# Patient Record
Sex: FEMALE | Race: WHITE | Employment: UNEMPLOYED | URBAN - METROPOLITAN AREA
[De-identification: names, ages, dates, MRNs, and addresses within clinical notes are randomized per-mention and may not be internally consistent; named-entity substitution may affect disease eponyms.]

---

## 2020-05-22 ENCOUNTER — RESEARCH ENCOUNTER (OUTPATIENT)
Dept: ONCOLOGY | Age: 40
End: 2020-05-22

## 2020-05-22 ENCOUNTER — TELEPHONE (OUTPATIENT)
Dept: ONCOLOGY | Age: 40
End: 2020-05-22

## 2020-05-22 ENCOUNTER — DOCUMENTATION ONLY (OUTPATIENT)
Dept: ONCOLOGY | Age: 40
End: 2020-05-22

## 2020-05-22 ENCOUNTER — OFFICE VISIT (OUTPATIENT)
Dept: ONCOLOGY | Age: 40
End: 2020-05-22

## 2020-05-22 VITALS
OXYGEN SATURATION: 97 % | SYSTOLIC BLOOD PRESSURE: 125 MMHG | TEMPERATURE: 98.1 F | HEIGHT: 64 IN | BODY MASS INDEX: 21.58 KG/M2 | HEART RATE: 49 BPM | RESPIRATION RATE: 17 BRPM | DIASTOLIC BLOOD PRESSURE: 85 MMHG | WEIGHT: 126.4 LBS

## 2020-05-22 DIAGNOSIS — C50.112 MALIGNANT NEOPLASM OF CENTRAL PORTION OF LEFT BREAST IN FEMALE, ESTROGEN RECEPTOR POSITIVE (HCC): Primary | ICD-10-CM

## 2020-05-22 DIAGNOSIS — Z17.0 MALIGNANT NEOPLASM OF CENTRAL PORTION OF LEFT BREAST IN FEMALE, ESTROGEN RECEPTOR POSITIVE (HCC): Primary | ICD-10-CM

## 2020-05-22 PROBLEM — C50.912: Status: ACTIVE | Noted: 2020-05-22

## 2020-05-22 RX ORDER — DOXYCYCLINE 100 MG/1
100 CAPSULE ORAL 2 TIMES DAILY
Qty: 84 CAP | Refills: 0 | Status: SHIPPED | OUTPATIENT
Start: 2020-05-22 | End: 2020-06-18

## 2020-05-22 RX ORDER — ONDANSETRON 8 MG/1
8 TABLET, ORALLY DISINTEGRATING ORAL
Qty: 24 TAB | Refills: 3 | Status: SHIPPED | OUTPATIENT
Start: 2020-05-22

## 2020-05-22 RX ORDER — LIDOCAINE AND PRILOCAINE 25; 25 MG/G; MG/G
CREAM TOPICAL AS NEEDED
Qty: 30 G | Refills: 0 | Status: SHIPPED | OUTPATIENT
Start: 2020-05-22 | End: 2022-02-17 | Stop reason: ALTCHOICE

## 2020-05-22 RX ORDER — METOPROLOL TARTRATE 50 MG/1
TABLET ORAL 2 TIMES DAILY
COMMUNITY
End: 2020-08-13

## 2020-05-22 RX ORDER — PROCHLORPERAZINE MALEATE 10 MG
10 TABLET ORAL
Qty: 50 TAB | Refills: 5 | Status: SHIPPED | OUTPATIENT
Start: 2020-05-22 | End: 2021-03-02

## 2020-05-22 RX ORDER — ONDANSETRON HYDROCHLORIDE 8 MG/1
8 TABLET, FILM COATED ORAL
Qty: 24 TAB | Refills: 3 | Status: CANCELLED | OUTPATIENT
Start: 2020-05-22

## 2020-05-22 RX ORDER — DEXAMETHASONE 4 MG/1
TABLET ORAL
Qty: 32 TAB | Refills: 3 | Status: SHIPPED | OUTPATIENT
Start: 2020-05-22 | End: 2021-03-02

## 2020-05-22 NOTE — PROGRESS NOTES
Ptaricia Finch is a 38 y.o. female here as a new patient for the evaluation of treatment for breast cancer.

## 2020-05-22 NOTE — PROGRESS NOTES
[facility] [place] at [place] [address] [address] [place] [phone]  F: [phone]      Reason for Visit:   Peter Rankin is a 38 y.o. female who is seen in consultation at the request of Dr. Tico Christiansen for evaluation of therapy for breast cancer    Treatment History:   · 20 left breast ax core bx: Adenocarcinoma, ER + at 86% ; MT + at 69%; HER 2 POSITIVE (IHC 2+, FISH ratio 3.2; sig/cell 6.08), ki67 29%  · 20 L breast core bx:  pending    History of Present Illness:   She noticed a L axilla mass in 2020, leading to the pathology above. FH:  No breast, ovarian, prostate, or pancreas cancer    Past Medical History:   Diagnosis Date    Essential hypertension     HX OTHER MEDICAL ,         Infertility     IVF with first pregnancy    Infertility, female     Psychiatric problem       Past Surgical History:   Procedure Laterality Date    HX OTHER SURGICAL      [place] Teeth Removal      Social History     Tobacco Use    Smoking status: Never Smoker   Substance Use Topics    Alcohol use: No      Family History   Problem Relation Age of Onset    Diabetes Mother     Heart Disease Mother      Current Outpatient Medications   Medication Sig    ibuprofen (MOTRIN) 600 mg tablet Take 1 Tab by mouth every six (6) hours as needed for Pain.  sertraline (ZOLOFT) 25 mg tablet Take 25 mg by mouth daily.  PNV No.22-Iron Cbn&Gluc-FA-DOS 27-1-50 mg Tab Take  by mouth. Indications: PREGNANCY     No current facility-administered medications for this visit. No Known Allergies     Review of Systems: A complete review of systems was obtained, negative except as described above. Physical Exam:   There were no vitals taken for this visit.   ECOG PS: 0    Constitutional: Appears well-developed and well-nourished in no apparent distress      Mental status: Alert and awake, Oriented to person/place/time, Able to follow commands    Eyes: EOM normal, Sclera normal, No visible ocular discharge    HENT: Normocephalic, atraumatic    Mouth/Throat: Moist mucous membranes    External Ears normal    Neck: No visualized mass    Pulmonary/Chest: Respiratory effort normal, No visualized signs of difficulty breathing or respiratory distress    Musculoskeletal: Normal gait with no signs of ataxia, Normal range of motion of neck    Neurological: No facial asymmetry (Cranial nerve 7 motor function), No gaze palsy    Skin: No significant exanthematous lesions or discoloration noted on facial skin    Psychiatric: Normal affect, No hallucinations     Breasts: L breast  bandaged; 3 cm L ax LN        Results:     Lab Results   Component Value Date/Time    WBC 7.1 05/18/2016 05:42 PM    HGB 10.7 (L) 05/18/2016 05:42 PM    HCT 32.2 (L) 05/18/2016 05:42 PM    PLATELET 351 (L) 19/03/4224 05:54 AM    MCV 81.7 05/18/2016 05:42 PM    ABS. NEUTROPHILS 8.2 (H) 04/09/2009 04:35 PM     Lab Results   Component Value Date/Time    Sodium 137 05/18/2016 05:42 PM    Potassium 3.6 05/18/2016 05:42 PM    Chloride 103 05/18/2016 05:42 PM    CO2 23 05/18/2016 05:42 PM    Glucose 76 05/18/2016 05:42 PM    BUN 8 05/18/2016 05:42 PM    Creatinine 0.44 (L) 05/18/2016 05:42 PM    GFR est AA >60 05/18/2016 05:42 PM    GFR est non-AA >60 05/18/2016 05:42 PM    Calcium 7.9 (L) 05/18/2016 05:42 PM     Lab Results   Component Value Date/Time    Bilirubin, total 0.5 05/18/2016 05:42 PM    ALT (SGPT) 15 05/18/2016 05:42 PM    AST (SGOT) 16 05/18/2016 05:42 PM    Alk. phosphatase 172 (H) 05/18/2016 05:42 PM    Protein, total 6.1 (L) 05/18/2016 05:42 PM    Albumin 2.8 (L) 05/18/2016 05:42 PM    Globulin 3.3 05/18/2016 05:42 PM     5/20/20 breast  MRI  Subareolar region of L breast 1.2 cm, at least 2 LN on left, 3 cm largest  Right breast negative    5/20/20 bone scan  Heterogeneous activity in the ribs of uncertain significance.   This would be an unusual presentation of bony metastatic disease    5/20/20 CT c/a/p  negative    Records reviewed and summarized above. Pathology report(s) reviewed above. Radiology report(s) reviewed above. Assessment/plan:   1.  L breast cancer, LN + by core, ER +, NV +, HER 2 POSITIVE, cT1c cN1a cM0:  Stage IIA, prognostic stage IB    We explained to the patient that the goal of systemic adjuvant therapy is to improve the chances for cure and decrease the risk of relapse. We explained why a patient can have microscopic cancer spread now even though physical examination, laboratory studies and imaging studies are negative for cancer. We explained that the same treatments used now as adjuvant or preventive treatments rarely if ever are curative in women who develop metastases. We discussed that there is no difference in overall survival between neoadjuvant and adjuvant chemotherapy. We discussed the rationale for neoadjuvant chemotherapy, if chemotherapy is warranted, as it is in this case: to avoid any potential delays in giving chemotherapy following surgery, to be able to see the response of the tumor to chemotherapy, and to potentially downstage the tumor prior to surgery. Salvatore Albino suggests equivalency between q.3 week Adriamycin, Cytoxan followed by weekly paclitaxel and trastuzumab compared with the [facility] regimen. However, this study was not powered to show a difference between these two regimens, and in the [facility] publication, the AC-TH arm showed a numerically advantange (though not statistically significant) to the [facility] arm. In this patient, it is completely reasonable to use [facility] approach and avoid the potential cardiotoxicity of the anthracyclines as well as the potential for leukemia. We discussed the toxicities of docetaxel and carboplatin chemotherapy in detail. This chemotherapy frequently causes a low white blood cell count and hospital admissions for treatment of neutropenic fever.   We explained that we consider the use of growth factors to minimize this risk.  We explained to the patient that some side effects if they occur only last a few days including nausea, vomiting, stomatitis, arthralgia, myalgia,and allergic reactions to Taxotere. We told the patient that severe nausea and vomiting were uncommon and that some side effects,if they occur, will last longer; this includes hair loss, which will be seen in all patients treated with these agents and fatigue,which will be seen in most.  We also informed that for the patient that heart damage is rare with these agents. We explained that carboplatin can rarely cause kidney damage and high frequency hearing loss. We provided the patient in detail her information concerning the toxicities of this regimen in addition to her overall discussion. Rationale for therapy with trastuzumab was also discussed with the patient including a 50% proportional improvement in disease free survival and also an improvement in overall survival in patients receiving trastuzumab and chemotherapy for HER-2 positive breast cancer. The side effects of trastuzumab were discussed including a 4%-5% risk of dropping her ejection fraction while on treatment and about a 1% risk of CHF. We discussed that this drug will be used every 3 weeks for remainder of a year following the chemotherapy cycles. We will check her EF before chemotherapy and every 3 months while she is receiving trastuzumab. Rational for therapy with pertuzumab was also discussed with the patient including a nearly 20% improvement seen in pCR in the neoadjuvant setting with the addition of this medication. Additional AE discussed including an acne rash (and the use of doxycyline 100 mg bid to help prevent) and diarrhea and consideration of additional cardiomyopathy.     · TCH-P (Trastuzumab 8mg/kg load with cycle 1 then 6mg/kg, Docetaxel 75mg/m2, Carboplatin AUC 6, Pertuzumab 840mg load with cycle 1 then 420mg) given every 3 weeks x 6 cycles  · Labs: CBC, CMP prior to each treatment. · Antiemetic Prophylaxis: Palonosetron and dexamethasone prior to chemo  · PRN Antiemetics: Ondansetron, Compazine  · Swelling prophylaxis: Dexmethasone 8mg bid the day before and day after chemotherapy  · TTE prior to chemotherapy and every 12 weeks while on Trastuzumab  · Neulasta 24-72 hours after each treatment    Cold caps discussed. She is interested and got fitted    Oral and peripheral cryotherapy discussed. Peripheral neuropathy trial discussed and research met with her (essential trial for her). Screened by me for compass HER 2 de-escalation study, but her primary tumor is not > 1.5 cm    Discussed outback HP if pCR, if no pCR, discussed T-DM1     After this discussion, she is agreeable to [facility] as above, she has signed informed consent. TTE ordered. Dr. Roldan Wright to place port on 5/27/20     Breast mass biopsied and clipped on 5/21/20    The patient was given presciptions for a wig, emla cream, decadron to take 8 mg bid the day before and day after chemo, zofran and compazine. Neulasta the following day. Plan to start 6/1/20    IUD will be removed by gyn    2. Emotional well being:  She has excellent support and is coping well with her disease    3. Genetic testing:  discussed potential ramifications of a positive test including the potential need for bilateral mastectomies and bilateral oophorectomies and the risk then for her family members to also have a mutation. VUS also discussed. Tested in Dr. Samia Gregory office on 5/21/20    4. Loss of fertility:  Discussed potential loss of menses and fertility. She is not interested in having further children. Declines oncofertility consult    > 80 min were spent with this patient with > 50% of that time spent in face to face counseling. I appreciate the opportunity to participate in Ms. Nora Hayward care. Signed By: Aide Charles MD      No orders of the defined types were placed in this encounter.

## 2020-05-22 NOTE — TELEPHONE ENCOUNTER
5/22/2020 8:32 AM: [facility] radiology and left voicemail requesting patient's scan results from yesterday.  RN will also fax a stat request.

## 2020-05-22 NOTE — PROGRESS NOTES
5/22/2020 11:28 AM: Provided patient with a chemotherapy education packet including a handout on breast cancer diagnosis, a handout on TCH-P chemotherapy regimen, a handout on common side effects of chemotherapy, and a handout on how to safely handle body secretions and waste after chemotherapy.  RN reviewed packet with the patient and opportunity was provided for questions and concerns.

## 2020-05-22 NOTE — PROGRESS NOTES
Met with patient regarding interest in clinical trial . Provided patient a copy of the consent for her review. Answered all immediate questions. No further questions at this time. Will follow up on 5/29 for decision.

## 2020-05-22 NOTE — TELEPHONE ENCOUNTER
Spoke with patient in regards to follow up appointment and treatment schedule. We also talk about signing up for MY Chart. I e-mail her the AVS from her visit today with the schedule of treatment. Did instruct patient to call our office before her treatment day if policy had change in regard to having visitor with patient for treatment at this moment- no visitor are allow.

## 2020-05-22 NOTE — PATIENT INSTRUCTIONS
Common Side Effects of Chemotherapy  Decreased Blood Counts Your blood counts can decrease temporarily due to chemotherapy, they will recover over time. This is an expected side effect that your Doctor will be monitoring.  - If you experience fevers (temperature >100.4°F), bleeding or unexplained bruising, please call the office right away   Risk of Infection Your white blood cells can decrease temporarily due to chemotherapy and can put you at higher risk of infection. Washing hands frequently with soap and avoiding sick contacts can reduce your risk of infection.  - If you experience fevers (temperature >100.4°F), shaking chills, or any signs of infection, please call the office immediately   Anemia Chemotherapy can cause your red blood cells to temporarily decrease; this is an expected side effect that your Doctor will be monitoring.  - You may experience fatigue if this occurs, please notify the office if you experience bleeding, shortness of breath with minimal exertion or at rest, rapid heartbeat, or feeling as though you may lose consciousness. Hair Loss Chemotherapy can affect your hair follicles and cause you to lose hair. This can occur on your scalp hair but also all over your body including eyebrows and eye lashes   Nausea  You have been prescribed nausea medication to take if needed. Please follow the directions given to you by your Doctor. - Please call the office if the medications you have been given are not relieving nausea. Vomiting Make sure you are taking anti-nausea medication as prescribed. Eating small amounts of bland foods frequently can help. - Please call the office right away if you are vomiting more than 4 times per day or are unable to keep down food or fluids   Diarrhea Eating small amounts of bland foods frequently can help, increase your fluid intake. It is usually ok to take Imodium for diarrhea.   - Please call the office right away if you experience more than 4 episodes of watery diarrhea or if you are feeling dehydrated. Female patients of childbearing age need to avoid pregnancy during chemotherapy. You can reach Medical Oncology at [address] [facility] with further questions or concerns at: [phone].  - Calls during normal business hours will reach our office.  - Calls after hours or on the weekend will reach an answering service and the on-call Oncologist will return your call. Prognosis: Good     This is our best current assessment. Cancers respond differently to treatment. Overall prognosis depends on many factors including other conditions, cancer stage, side effects, and other unforeseen events. Goal of therapy: Curative     Expected response to treatment:  Very good: Anticipate remission (no sign of cancer) and possible cancer cure    Treatment benefits and harms:  We discussed potential short term side effects to include:see handouts    Long term side effects of treatment:  see handouts    Quality of life: Quality of life concerns have been addressed. Treatment as outlined is expected to have minimal impact on patients quality of life.      presciptions for a wig, emla cream, decadron to take 8 mg twice a day, the day before and day after chemo, zofran and compazine

## 2020-05-22 NOTE — ONCOLOGY PATHWAY NOTE
START ON PATHWAY REGIMEN - Breast    [id number]        Pertuzumab (Perjeta)       Pertuzumab (Perjeta)       Trastuzumab-xxxx       Trastuzumab-xxxx       Carboplatin (Paraplatin)       Docetaxel (Taxotere)     **Always confirm dose/schedule in your pharmacy ordering system**    Patient Characteristics:  Preoperative or Nonsurgical Candidate (Clinical Staging), Neoadjuvant Therapy followed by Surgery, Invasive Disease, Chemotherapy, HER2 Positive, ER Positive  Therapeutic Status: Preoperative or Nonsurgical Candidate (Clinical Staging)  AJCC M Category: cM0  AJCC Grade: GX  Breast Surgical Plan: Neoadjuvant Therapy followed by Surgery  ER Status: Positive (+)  AJCC 8 Stage Grouping: IB  HER2 Status: Positive (+)  AJCC T Category: cTX  AJCC N Category: cNX  RI Status: Positive (+)  Intent of Therapy:  Curative Intent, Discussed with Patient

## 2020-05-29 RX ORDER — DIPHENHYDRAMINE HYDROCHLORIDE 50 MG/ML
50 INJECTION, SOLUTION INTRAMUSCULAR; INTRAVENOUS AS NEEDED
Status: CANCELLED
Start: 2020-06-01

## 2020-05-29 RX ORDER — SODIUM CHLORIDE 0.9 % (FLUSH) 0.9 %
10 SYRINGE (ML) INJECTION AS NEEDED
Status: CANCELLED
Start: 2020-06-01

## 2020-05-29 RX ORDER — SODIUM CHLORIDE 9 MG/ML
25 INJECTION, SOLUTION INTRAVENOUS CONTINUOUS
Status: CANCELLED | OUTPATIENT
Start: 2020-06-01

## 2020-05-29 RX ORDER — HEPARIN 100 UNIT/ML
300-500 SYRINGE INTRAVENOUS AS NEEDED
Status: CANCELLED
Start: 2020-06-01

## 2020-05-29 RX ORDER — HYDROCORTISONE SODIUM SUCCINATE 100 MG/2ML
100 INJECTION, POWDER, FOR SOLUTION INTRAMUSCULAR; INTRAVENOUS AS NEEDED
Status: CANCELLED | OUTPATIENT
Start: 2020-06-01

## 2020-05-29 RX ORDER — ACETAMINOPHEN 325 MG/1
650 TABLET ORAL AS NEEDED
Status: CANCELLED
Start: 2020-06-01

## 2020-05-29 RX ORDER — EPINEPHRINE 1 MG/ML
0.3 INJECTION, SOLUTION, CONCENTRATE INTRAVENOUS AS NEEDED
Status: CANCELLED | OUTPATIENT
Start: 2020-06-01

## 2020-05-29 RX ORDER — SODIUM CHLORIDE 9 MG/ML
10 INJECTION INTRAMUSCULAR; INTRAVENOUS; SUBCUTANEOUS AS NEEDED
Status: CANCELLED | OUTPATIENT
Start: 2020-06-01

## 2020-05-29 RX ORDER — ALBUTEROL SULFATE 0.83 MG/ML
2.5 SOLUTION RESPIRATORY (INHALATION) AS NEEDED
Status: CANCELLED
Start: 2020-06-01

## 2020-05-29 RX ORDER — DEXAMETHASONE SODIUM PHOSPHATE 100 MG/10ML
10 INJECTION INTRAMUSCULAR; INTRAVENOUS ONCE
Status: CANCELLED | OUTPATIENT
Start: 2020-06-01

## 2020-05-29 RX ORDER — PALONOSETRON 0.05 MG/ML
0.25 INJECTION, SOLUTION INTRAVENOUS ONCE
Status: CANCELLED
Start: 2020-06-01

## 2020-05-29 RX ORDER — ONDANSETRON 2 MG/ML
8 INJECTION INTRAMUSCULAR; INTRAVENOUS AS NEEDED
Status: CANCELLED | OUTPATIENT
Start: 2020-06-01

## 2020-05-29 RX ORDER — DIPHENHYDRAMINE HYDROCHLORIDE 50 MG/ML
25 INJECTION, SOLUTION INTRAMUSCULAR; INTRAVENOUS AS NEEDED
Status: CANCELLED
Start: 2020-06-01

## 2020-06-01 ENCOUNTER — HOSPITAL ENCOUNTER (OUTPATIENT)
Dept: INFUSION THERAPY | Age: 40
Discharge: HOME | End: 2020-06-01
Payer: COMMERCIAL

## 2020-06-01 ENCOUNTER — RESEARCH ENCOUNTER (OUTPATIENT)
Dept: ONCOLOGY | Age: 40
End: 2020-06-01

## 2020-06-01 ENCOUNTER — OFFICE VISIT (OUTPATIENT)
Dept: ONCOLOGY | Age: 40
End: 2020-06-01

## 2020-06-01 VITALS
SYSTOLIC BLOOD PRESSURE: 128 MMHG | TEMPERATURE: 98.3 F | OXYGEN SATURATION: 98 % | DIASTOLIC BLOOD PRESSURE: 76 MMHG | HEIGHT: 64 IN | BODY MASS INDEX: 21.34 KG/M2 | WEIGHT: 125 LBS | RESPIRATION RATE: 16 BRPM | HEART RATE: 52 BPM

## 2020-06-01 VITALS
DIASTOLIC BLOOD PRESSURE: 77 MMHG | HEART RATE: 67 BPM | TEMPERATURE: 98.3 F | BODY MASS INDEX: 21.44 KG/M2 | SYSTOLIC BLOOD PRESSURE: 126 MMHG | WEIGHT: 125.6 LBS | OXYGEN SATURATION: 98 % | HEIGHT: 64 IN | RESPIRATION RATE: 16 BRPM

## 2020-06-01 DIAGNOSIS — C50.112 MALIGNANT NEOPLASM OF CENTRAL PORTION OF LEFT BREAST IN FEMALE, ESTROGEN RECEPTOR POSITIVE (HCC): Primary | ICD-10-CM

## 2020-06-01 DIAGNOSIS — Z17.0 MALIGNANT NEOPLASM OF CENTRAL PORTION OF LEFT BREAST IN FEMALE, ESTROGEN RECEPTOR POSITIVE (HCC): Primary | ICD-10-CM

## 2020-06-01 DIAGNOSIS — Z51.11 CHEMOTHERAPY MANAGEMENT, ENCOUNTER FOR: ICD-10-CM

## 2020-06-01 DIAGNOSIS — C50.912 STAGE II BREAST CANCER, LEFT (HCC): Primary | ICD-10-CM

## 2020-06-01 LAB
ALBUMIN SERPL-MCNC: 4.3 G/DL (ref 3.5–5)
ALBUMIN/GLOB SERPL: 1.3 {RATIO} (ref 1.1–2.2)
ALP SERPL-CCNC: 51 U/L (ref 45–117)
ALT SERPL-CCNC: 23 U/L (ref 12–78)
ANION GAP SERPL CALC-SCNC: 9 MMOL/L (ref 5–15)
AST SERPL-CCNC: 14 U/L (ref 15–37)
BASO+EOS+MONOS # BLD AUTO: 0.3 K/UL (ref 0.2–1.2)
BASO+EOS+MONOS NFR BLD AUTO: 2 % (ref 3.2–16.9)
BILIRUB SERPL-MCNC: 0.6 MG/DL (ref 0.2–1)
BUN SERPL-MCNC: 11 MG/DL (ref 6–20)
BUN/CREAT SERPL: 14 (ref 12–20)
CALCIUM SERPL-MCNC: 8.9 MG/DL (ref 8.5–10.1)
CHLORIDE SERPL-SCNC: 103 MMOL/L (ref 97–108)
CO2 SERPL-SCNC: 25 MMOL/L (ref 21–32)
CREAT SERPL-MCNC: 0.8 MG/DL (ref 0.55–1.02)
DIFFERENTIAL METHOD BLD: ABNORMAL
ERYTHROCYTE [DISTWIDTH] IN BLOOD BY AUTOMATED COUNT: 13 % (ref 11.8–15.8)
GLOBULIN SER CALC-MCNC: 3.3 G/DL (ref 2–4)
GLUCOSE SERPL-MCNC: 147 MG/DL (ref 65–100)
HCT VFR BLD AUTO: 39 % (ref 35–47)
HGB BLD-MCNC: 14.2 G/DL (ref 11.5–16)
LYMPHOCYTES # BLD: 0.8 K/UL (ref 0.8–3.5)
LYMPHOCYTES NFR BLD: 7 % (ref 12–49)
MCH RBC QN AUTO: 32.1 PG (ref 26–34)
MCHC RBC AUTO-ENTMCNC: 36.4 G/DL (ref 30–36.5)
MCV RBC AUTO: 88.2 FL (ref 80–99)
NEUTS SEG # BLD: 10.2 K/UL (ref 1.8–8)
NEUTS SEG NFR BLD: 91 % (ref 32–75)
PLATELET # BLD AUTO: 244 K/UL (ref 150–400)
POTASSIUM SERPL-SCNC: 3.8 MMOL/L (ref 3.5–5.1)
PROT SERPL-MCNC: 7.6 G/DL (ref 6.4–8.2)
RBC # BLD AUTO: 4.42 M/UL (ref 3.8–5.2)
SODIUM SERPL-SCNC: 137 MMOL/L (ref 136–145)
WBC # BLD AUTO: 11.3 K/UL (ref 3.6–11)

## 2020-06-01 PROCEDURE — 96417 CHEMO IV INFUS EACH ADDL SEQ: CPT

## 2020-06-01 PROCEDURE — 36415 COLL VENOUS BLD VENIPUNCTURE: CPT

## 2020-06-01 PROCEDURE — 74011000258 HC RX REV CODE- 258: Performed by: INTERNAL MEDICINE

## 2020-06-01 PROCEDURE — 74011250636 HC RX REV CODE- 250/636: Performed by: INTERNAL MEDICINE

## 2020-06-01 PROCEDURE — 96413 CHEMO IV INFUSION 1 HR: CPT

## 2020-06-01 PROCEDURE — 96415 CHEMO IV INFUSION ADDL HR: CPT

## 2020-06-01 PROCEDURE — 96377 APPLICATON ON-BODY INJECTOR: CPT

## 2020-06-01 PROCEDURE — 77030016057 HC NDL HUBR APOL -B

## 2020-06-01 PROCEDURE — 80053 COMPREHEN METABOLIC PANEL: CPT

## 2020-06-01 PROCEDURE — 96367 TX/PROPH/DG ADDL SEQ IV INF: CPT

## 2020-06-01 PROCEDURE — 96375 TX/PRO/DX INJ NEW DRUG ADDON: CPT

## 2020-06-01 PROCEDURE — 85025 COMPLETE CBC W/AUTO DIFF WBC: CPT

## 2020-06-01 RX ORDER — SODIUM CHLORIDE 9 MG/ML
10 INJECTION INTRAMUSCULAR; INTRAVENOUS; SUBCUTANEOUS AS NEEDED
Status: DISCONTINUED | OUTPATIENT
Start: 2020-06-01 | End: 2020-06-02 | Stop reason: HOSPADM

## 2020-06-01 RX ORDER — ACETAMINOPHEN 325 MG/1
650 TABLET ORAL AS NEEDED
Status: ACTIVE | OUTPATIENT
Start: 2020-06-01 | End: 2020-06-01

## 2020-06-01 RX ORDER — PALONOSETRON 0.05 MG/ML
0.25 INJECTION, SOLUTION INTRAVENOUS ONCE
Status: COMPLETED | OUTPATIENT
Start: 2020-06-01 | End: 2020-06-01

## 2020-06-01 RX ORDER — DEXAMETHASONE SODIUM PHOSPHATE 100 MG/10ML
10 INJECTION INTRAMUSCULAR; INTRAVENOUS ONCE
Status: COMPLETED | OUTPATIENT
Start: 2020-06-01 | End: 2020-06-01

## 2020-06-01 RX ORDER — HEPARIN 100 UNIT/ML
300-500 SYRINGE INTRAVENOUS AS NEEDED
Status: DISCONTINUED | OUTPATIENT
Start: 2020-06-01 | End: 2020-06-02 | Stop reason: HOSPADM

## 2020-06-01 RX ORDER — ONDANSETRON 2 MG/ML
8 INJECTION INTRAMUSCULAR; INTRAVENOUS AS NEEDED
Status: ACTIVE | OUTPATIENT
Start: 2020-06-01 | End: 2020-06-01

## 2020-06-01 RX ORDER — DIPHENHYDRAMINE HYDROCHLORIDE 50 MG/ML
25 INJECTION, SOLUTION INTRAMUSCULAR; INTRAVENOUS AS NEEDED
Status: ACTIVE | OUTPATIENT
Start: 2020-06-01 | End: 2020-06-01

## 2020-06-01 RX ORDER — SODIUM CHLORIDE 0.9 % (FLUSH) 0.9 %
10 SYRINGE (ML) INJECTION AS NEEDED
Status: DISCONTINUED | OUTPATIENT
Start: 2020-06-01 | End: 2020-06-02 | Stop reason: HOSPADM

## 2020-06-01 RX ORDER — SODIUM CHLORIDE 9 MG/ML
25 INJECTION, SOLUTION INTRAVENOUS CONTINUOUS
Status: DISPENSED | OUTPATIENT
Start: 2020-06-01 | End: 2020-06-01

## 2020-06-01 RX ORDER — HYDROCORTISONE SODIUM SUCCINATE 100 MG/2ML
100 INJECTION, POWDER, FOR SOLUTION INTRAMUSCULAR; INTRAVENOUS AS NEEDED
Status: ACTIVE | OUTPATIENT
Start: 2020-06-01 | End: 2020-06-01

## 2020-06-01 RX ADMIN — SODIUM CHLORIDE 25 ML/HR: 900 INJECTION, SOLUTION INTRAVENOUS at 09:42

## 2020-06-01 RX ADMIN — CARBOPLATIN 650 MG: 10 INJECTION, SOLUTION INTRAVENOUS at 12:33

## 2020-06-01 RX ADMIN — PERTUZUMAB 840 MG: 30 INJECTION, SOLUTION, CONCENTRATE INTRAVENOUS at 14:54

## 2020-06-01 RX ADMIN — Medication 10 ML: at 16:23

## 2020-06-01 RX ADMIN — TRASTUZUMAB 458 MG: 150 INJECTION, POWDER, LYOPHILIZED, FOR SOLUTION INTRAVENOUS at 13:11

## 2020-06-01 RX ADMIN — DEXAMETHASONE SODIUM PHOSPHATE 10 MG: 10 INJECTION INTRAMUSCULAR; INTRAVENOUS at 09:45

## 2020-06-01 RX ADMIN — DOCETAXEL ANHYDROUS 121 MG: 10 INJECTION, SOLUTION INTRAVENOUS at 11:23

## 2020-06-01 RX ADMIN — FOSAPREPITANT 150 MG: 150 INJECTION, POWDER, LYOPHILIZED, FOR SOLUTION INTRAVENOUS at 09:52

## 2020-06-01 RX ADMIN — PALONOSETRON HYDROCHLORIDE 0.25 MG: 0.25 INJECTION INTRAVENOUS at 09:43

## 2020-06-01 RX ADMIN — Medication 500 UNITS: at 16:22

## 2020-06-01 RX ADMIN — PEGFILGRASTIM 6 MG: KIT SUBCUTANEOUS at 16:06

## 2020-06-01 NOTE — PROGRESS NOTES
Pt has been screened for all eligibility/ineligibility criteria and has been determined eligible for this protocol. Informed consent was reviewed by RN with patient prior to signing. All questions were answered adequately by RN or Dr. Champion. Patient signed consent today for study [id number]: \"A Prospective Observational Cohort Study to Develop a Predictive Model of Taxane-Induced Peripheral Neuropathy in Cancer Patients\". A copy of ICF given to patient. No additional questions at this time. Week 1 blood work obtained and processed per protocol. Patient also consented to DCP-001. Questionnaire completed per protocol. A copy of consent given to patient. No additional questions at this time.

## 2020-06-01 NOTE — PROGRESS NOTES
Anthony Wilson is a 38 y.o. female Follow up for the Evaluation of Breast Cancer. 1. Have you been to the ER, urgent care clinic since your last visit? Hospitalized since your last visit? No    2. Have you seen or consulted any other health care providers outside of the [address] [place] [address] since your last visit? Include any pap smears or colon screening.  No

## 2020-06-01 NOTE — PROGRESS NOTES
[facility] Progress Note    Date: 2020    Name: Gwen Jha    MRN: [id number]         : 1980    Ms. Bob Colon Arrived ambulatory and in no distress for cycle 1 day 1 of TCHP regimen. Assessment was completed, no acute issues at this time, no new complaints voiced. R chest port accessed without difficulty, labs drawn and in process. Education handout given to patient. Reviewed treatment plan and side effects with patient. Patient used cold cap today. Neuropathy labs were drawn for research study. Chemotherapy Flowsheet 2020   Cycle C1D1   Date 2020   Drug / Regimen TCHP         Proceeded to appt with Dr. Jagruti Haley    Ms. Mulligan's vitals were reviewed. Visit Vitals  BP (P) 128/76   Pulse (!) (P) 52   Temp (P) 98.3 °F (36.8 °C)   Resp (P) 16   SpO2 (P) 98%       Lab results were obtained and reviewed. Recent Results (from the past 12 hour(s))   CBC WITH 3 PART DIFF    Collection Time: 20  8:05 AM   Result Value Ref Range    WBC 11.3 (H) 3.6 - 11.0 K/uL    RBC 4.42 3.80 - 5.20 M/uL    HGB 14.2 11.5 - 16.0 g/dL    HCT 39.0 35.0 - 47.0 %    MCV 88.2 80.0 - 99.0 FL    MCH 32.1 26.0 - 34.0 PG    MCHC 36.4 30.0 - 36.5 g/dL    RDW 13.0 11.8 - 15.8 %    PLATELET 884 483 - 411 K/uL    NEUTROPHILS 91 (H) 32 - 75 %    MIXED CELLS 2 (L) 3.2 - 16.9 %    LYMPHOCYTES 7 (L) 12 - 49 %    ABS. NEUTROPHILS 10.2 (H) 1.8 - 8.0 K/UL    ABS. MIXED CELLS 0.3 0.2 - 1.2 K/uL    ABS.  LYMPHOCYTES 0.8 0.8 - 3.5 K/UL    DF AUTOMATED     METABOLIC PANEL, COMPREHENSIVE    Collection Time: 20  8:05 AM   Result Value Ref Range    Sodium 137 136 - 145 mmol/L    Potassium 3.8 3.5 - 5.1 mmol/L    Chloride 103 97 - 108 mmol/L    CO2 25 21 - 32 mmol/L    Anion gap 9 5 - 15 mmol/L    Glucose 147 (H) 65 - 100 mg/dL    BUN 11 6 - 20 MG/DL    Creatinine 0.80 0.55 - 1.02 MG/DL    BUN/Creatinine ratio 14 12 - 20      GFR est AA >60 >60 ml/min/1.73m2    GFR est non-AA >60 >60 ml/min/1.73m2    Calcium 8.9 8.5 - 10.1 MG/DL Bilirubin, total 0.6 0.2 - 1.0 MG/DL    ALT (SGPT) 23 12 - 78 U/L    AST (SGOT) 14 (L) 15 - 37 U/L    Alk. phosphatase 51 45 - 117 U/L    Protein, total 7.6 6.4 - 8.2 g/dL    Albumin 4.3 3.5 - 5.0 g/dL    Globulin 3.3 2.0 - 4.0 g/dL    A-G Ratio 1.3 1.1 - 2.2       Pre-medications  were administered as ordered and chemotherapy was initiated.      Medications Administered     0.9% sodium chloride infusion     Admin Date  06/01/2020 Action  New Bag Dose  25 mL/hr Rate  25 mL/hr Route  IntraVENous Administered By  Jama Taylor          CARBOplatin (PARAPLATIN) 650 mg in 0.9% sodium chloride 250 mL, overfill volume 25 mL chemo infusion     Admin Date  06/01/2020 Action  New Bag Dose  650 mg Rate  680 mL/hr Route  IntraVENous Administered By  Jama Taylor          dexamethasone (DECADRON) 10 mg/mL injection 10 mg     Admin Date  06/01/2020 Action  Given Dose  10 mg Route  IntraVENous Administered By  Jama Taylor          DOCEtaxeL (TAXOTERE) 121 mg in 0.9% sodium chloride 250 mL, overfill volume 25 mL chemo infusion     Admin Date  06/01/2020 Action  New Bag Dose  121 mg Route  IntraVENous Administered By  Jama Taylor          fosaprepitant (EMEND) 150 mg in 0.9% sodium chloride 150 mL IVPB     Admin Date  06/01/2020 Action  Given Dose  150 mg Rate  450 mL/hr Route  IntraVENous Administered By  Jama Taylor          heparin (porcine) pf 300-500 Units     Admin Date  06/01/2020 Action  Given Dose  500 Units Route  InterCATHeter Administered By  Jama Taylor          palonosetron HCl (ALOXI) injection 0.25 mg     Admin Date  06/01/2020 Action  Given Dose  0.25 mg Route  IntraVENous Administered By  Jama Taylor          pegfilgrastim (NEULASTA) wearable SQ injector 6 mg     Admin Date  06/01/2020 Action  Given Dose  6 mg Route  SubCUTAneous Administered By  Jama Taylor          pertuzumab (PERJETA) 840 mg in 0.9% sodium chloride 250 mL, overfill volume 25 mL IVPB     Admin Date  06/01/2020 Action  New Bag Dose  840 mg Rate  303 mL/hr Route  IntraVENous Administered By  Tivis Odessa          saline peripheral flush soln 10 mL     Admin Date  06/01/2020 Action  Given Dose  10 mL Route  InterCATHeter Administered By  Tivis Odessa          trastuzumab (HERCEPTIN) 458 mg in 0.9% sodium chloride 250 mL, overfill volume 25 mL IVPB     Admin Date  06/01/2020 Action  New Bag Dose  458 mg Rate  197.9 mL/hr Route  IntraVENous Administered By  Tivis Odessa                Education provided to patient about Neulasta On Body Injector including: side effects, how/when to remove the device, as well as what to do in the event of device malfunction. Opportunity for questions was provided and all questions were answered. Patient verbalized understanding. Ms. Shannon Salazar tolerated treatment well and was discharged from [place] [address] in stable condition. She is to return on 6/22 for her next appointment.     Adis Canas  June 1, 2020

## 2020-06-01 NOTE — PROGRESS NOTES
[facility] [facility] [place] [address]  [address] [facility], [address] [facility]  Frankey Reil: [phone]  F: [phone]      Reason for Visit:   Luz Maria Carmichael is a 38 y.o. female who is seen in consultation at the request of Dr. Ganga Azar for evaluation of therapy for breast cancer    Treatment History:   · 20 left breast ax core bx: Adenocarcinoma, ER + at 86% ; CO + at 69%; HER 2 POSITIVE (IHC 2+, FISH ratio 3.2; sig/cell 6.08), ki67 29%  · 20 L breast core bx: Atypical 1 mm focus, highly suspicious for [facility], lobular features, the tumor does not histologically match the patient's prior axillary cancer, but could represent a small sample of the same tumor  · 20 L breast excisional bx: Subareolar lumpectomy, IDC, 1.6 cm, invasive tumor present at anterior and lateral margins, gr 2, + LVI,  LN involved, 1.1 cm  · TCHP 2020-     History of Present Illness:   She noticed a L axilla mass in 2020, leading to the pathology above. Interval history:  In today for follow up and treatment. Complains of gr 1 fatigue.     FH:  No breast, ovarian, prostate, or pancreas cancer    Past Medical History:   Diagnosis Date    Anxiety     Cancer [facility])     Essential hypertension     HX OTHER MEDICAL ,         Infertility     IVF with first pregnancy    Infertility, female     Joint pain     Psychiatric problem       Past Surgical History:   Procedure Laterality Date    HX OTHER SURGICAL      [place] Teeth Removal    HX OTHER SURGICAL      2017 Excision of mole on toe with skin graph      Social History     Tobacco Use    Smoking status: Never Smoker    Smokeless tobacco: Never Used   Substance Use Topics    Alcohol use: Yes     Frequency: 4 or more times a week      Family History   Problem Relation Age of Onset    Diabetes Mother     Heart Disease Mother     Hypertension Mother     Hypertension Father     Cancer Paternal Grandmother         Lung cancer    Stroke Paternal Grandmother     Cancer Paternal Grandfather         Melanoma     Current Outpatient Medications   Medication Sig    prochlorperazine (COMPAZINE) 10 mg tablet Take 1 Tab by mouth every six (6) hours as needed for Nausea.  dexAMETHasone (DECADRON) 4 mg tablet 8 mg bid the day before and day after chemo    lidocaine-prilocaine (EMLA) topical cream Apply  to affected area as needed for Pain.  doxycycline (VIBRAMYCIN) 100 mg capsule Take 1 Cap by mouth two (2) times a day for 42 days.  ondansetron (ZOFRAN ODT) 8 mg disintegrating tablet Take 1 Tab by mouth every eight (8) hours as needed for Nausea or Vomiting. For 2 days following chemo    metoprolol tartrate (LOPRESSOR) 50 mg tablet Take  by mouth two (2) times a day.  ibuprofen (MOTRIN) 600 mg tablet Take 1 Tab by mouth every six (6) hours as needed for Pain.  sertraline (ZOLOFT) 25 mg tablet Take 25 mg by mouth daily.  PNV No.22-Iron Cbn&Gluc-FA-DOS 27-1-50 mg Tab Take  by mouth. Indications: PREGNANCY     No current facility-administered medications for this visit. No Known Allergies     Review of Systems: A complete review of systems was obtained, negative except as described above.     Physical Exam:     Visit Vitals  /76 (BP 1 Location: Left arm, BP Patient Position: Sitting)   Pulse (!) 52   Temp 98.3 °F (36.8 °C) (Temporal)   Resp 16   Ht 5' 4\" (1.626 m)   Wt 125 lb (56.7 kg)   SpO2 98%   BMI 21.46 kg/m²     ECOG PS: 0    Constitutional: Appears well-developed and well-nourished in no apparent distress      Mental status: Alert and awake, Oriented to person/place/time, Able to follow commands    Eyes: EOM normal, Sclera normal, No visible ocular discharge    HENT: Normocephalic, atraumatic    Mouth/Throat: Moist mucous membranes    External Ears normal    Neck: No visualized mass    Pulmonary/Chest: Respiratory effort normal, No visualized signs of difficulty breathing or respiratory distress    Musculoskeletal: Normal gait with no signs of ataxia, Normal range of motion of neck    Neurological: No facial asymmetry (Cranial nerve 7 motor function), No gaze palsy    Skin: No significant exanthematous lesions or discoloration noted on facial skin    Psychiatric: Normal affect, No hallucinations     Breasts: L breast 1 cm subareolar mass;  2.2 cm L ax LN, 1.5 cm L ax LN        Results:     Lab Results   Component Value Date/Time    WBC 11.3 (H) 06/01/2020 08:05 AM    HGB 14.2 06/01/2020 08:05 AM    HCT 39.0 06/01/2020 08:05 AM    PLATELET 661 06/68/1318 08:05 AM    MCV 88.2 06/01/2020 08:05 AM    ABS. NEUTROPHILS 10.2 (H) 06/01/2020 08:05 AM     Lab Results   Component Value Date/Time    Sodium 137 05/18/2016 05:42 PM    Potassium 3.6 05/18/2016 05:42 PM    Chloride 103 05/18/2016 05:42 PM    CO2 23 05/18/2016 05:42 PM    Glucose 76 05/18/2016 05:42 PM    BUN 8 05/18/2016 05:42 PM    Creatinine 0.44 (L) 05/18/2016 05:42 PM    GFR est AA >60 05/18/2016 05:42 PM    GFR est non-AA >60 05/18/2016 05:42 PM    Calcium 7.9 (L) 05/18/2016 05:42 PM     Lab Results   Component Value Date/Time    Bilirubin, total 0.5 05/18/2016 05:42 PM    ALT (SGPT) 15 05/18/2016 05:42 PM    Alk. phosphatase 172 (H) 05/18/2016 05:42 PM    Protein, total 6.1 (L) 05/18/2016 05:42 PM    Albumin 2.8 (L) 05/18/2016 05:42 PM    Globulin 3.3 05/18/2016 05:42 PM     5/20/20 breast  MRI  Subareolar region of L breast 1.2 cm mass, at least 2 LN on left, 3 cm largest  Right breast negative    5/20/20 bone scan  Heterogeneous activity in the ribs of uncertain significance. This would be an unusual presentation of bony metastatic disease    5/20/20 CT c/a/p  negative    Records reviewed and summarized above. Pathology report(s) reviewed above. Radiology report(s) reviewed above.       Assessment/plan:   1.  L breast cancer, LN + by core, ER +, NE +, HER 2 POSITIVE, cT1c cN1a cM0:  Stage IIA, prognostic stage IB    We explained to the patient that the goal of systemic adjuvant therapy is to improve the chances for cure and decrease the risk of relapse. We explained why a patient can have microscopic cancer spread now even though physical examination, laboratory studies and imaging studies are negative for cancer. We explained that the same treatments used now as adjuvant or preventive treatments rarely if ever are curative in women who develop metastases. We discussed that there is no difference in overall survival between neoadjuvant and adjuvant chemotherapy. We discussed the rationale for neoadjuvant chemotherapy, if chemotherapy is warranted, as it is in this case: to avoid any potential delays in giving chemotherapy following surgery, to be able to see the response of the tumor to chemotherapy, and to potentially downstage the tumor prior to surgery. Glorine Prader suggests equivalency between q.3 week Adriamycin, Cytoxan followed by weekly paclitaxel and trastuzumab compared with the [facility] regimen. However, this study was not powered to show a difference between these two regimens, and in the [facility] publication, the AC-TH arm showed a numerically advantange (though not statistically significant) to the [facility] arm. In this patient, it is completely reasonable to use [facility] approach and avoid the potential cardiotoxicity of the anthracyclines as well as the potential for leukemia. We discussed the toxicities of docetaxel and carboplatin chemotherapy in detail. This chemotherapy frequently causes a low white blood cell count and hospital admissions for treatment of neutropenic fever. We explained that we consider the use of growth factors to minimize this risk. We explained to the patient that some side effects if they occur only last a few days including nausea, vomiting, stomatitis, arthralgia, myalgia,and allergic reactions to Taxotere.   We told the patient that severe nausea and vomiting were uncommon and that some side effects,if they occur, will last longer; this includes hair loss, which will be seen in all patients treated with these agents and fatigue,which will be seen in most.  We also informed that for the patient that heart damage is rare with these agents. We explained that carboplatin can rarely cause kidney damage and high frequency hearing loss. We provided the patient in detail her information concerning the toxicities of this regimen in addition to her overall discussion. Rationale for therapy with trastuzumab was also discussed with the patient including a 50% proportional improvement in disease free survival and also an improvement in overall survival in patients receiving trastuzumab and chemotherapy for HER-2 positive breast cancer. The side effects of trastuzumab were discussed including a 4%-5% risk of dropping her ejection fraction while on treatment and about a 1% risk of CHF. We discussed that this drug will be used every 3 weeks for remainder of a year following the chemotherapy cycles. We will check her EF before chemotherapy and every 3 months while she is receiving trastuzumab. Rational for therapy with pertuzumab was also discussed with the patient including a nearly 20% improvement seen in pCR in the neoadjuvant setting with the addition of this medication. Additional AE discussed including an acne rash (and the use of doxycyline 100 mg bid to help prevent) and diarrhea and consideration of additional cardiomyopathy. · TCH-P (Trastuzumab 8mg/kg load with cycle 1 then 6mg/kg, Docetaxel 75mg/m2, Carboplatin AUC 6, Pertuzumab 840mg load with cycle 1 then 420mg) given every 3 weeks x 6 cycles  · Labs: CBC, CMP prior to each treatment.    · Antiemetic Prophylaxis: Palonosetron and dexamethasone prior to chemo  · PRN Antiemetics: Ondansetron, Compazine  · Swelling prophylaxis: Dexmethasone 8mg bid the day before and day after chemotherapy  · TTE prior to chemotherapy and every 12 weeks while on Trastuzumab  · Neulasta 24-72 hours after each treatment    Cold caps discussed. She is interested and got fitted and will use    Oral and peripheral cryotherapy discussed. Peripheral neuropathy trial discussed and research met with her (essential trial for her) and she has consented. Screened by me for compass HER 2 de-escalation study, but her primary tumor is not > 1.5 cm    Discussed outback HP if pCR, if no pCR, discussed T-DM1     After this discussion, she is agreeable to TCH-P as above, she has signed informed consent. TTE on 5/28/2020 EF 64%. Port placed by Dr. Modesta Marie on 5/27/20     Breast mass biopsied and clipped on 5/21/20, also had surgical biopsy on 5/27/2020, will obtain results. She has follow up with Dr. Giovanna Paula on 6/3/2020. The patient was given presciptions for a wig, emla cream, decadron to take 8 mg bid the day before and day after chemo, zofran and compazine. Neulasta the following day. Will proceed with TCHP #1 today. IUD was removed by gyn. 2. Emotional well being:  She has excellent support and is coping well with her disease    3. Genetic testing:  discussed potential ramifications of a positive test including the potential need for bilateral mastectomies and bilateral oophorectomies and the risk then for her family members to also have a mutation. VUS also discussed. Tested in Dr. Joey Holman office on 5/21/20, negative, will obtain results. 4. Loss of fertility:  Discussed potential loss of menses and fertility. She is not interested in having further children. Declines oncofertility consult      I appreciate the opportunity to participate in [facility]. Signed By: Ming Schuster MD      No orders of the defined types were placed in this encounter.

## 2020-06-04 ENCOUNTER — TELEPHONE (OUTPATIENT)
Dept: ONCOLOGY | Age: 40
End: 2020-06-04

## 2020-06-04 NOTE — TELEPHONE ENCOUNTER
6/4/2020 2:26 PM: Called [facility] pathology to request add-on testing for ER, WA, and HER2 receptors on patient's 5/28/2020 specimen. No answer; left voicemail requesting call back. 6/4/2020 4:23 PM: Received call from Vilma Mclaughlin from [facility] pathology, who stated that ER, WA, and HER2 status are located on page 3 of the pathology report under \"ancillary studies. \" According to that section of the report, ER+ (86%), WA+ (69%), HER2+ (FISH). Inquired if Vilma Mclaughlin can send a copy of the actual [address] report; Vilma Mclaughlin stated that this was not sent out for [address] and FISH was done on the original core biopsy. Vilma Mclaughlin stated she will fax the reports that she has to this office.

## 2020-06-12 RX ORDER — DIPHENHYDRAMINE HYDROCHLORIDE 50 MG/ML
25 INJECTION, SOLUTION INTRAMUSCULAR; INTRAVENOUS AS NEEDED
Status: CANCELLED
Start: 2020-08-03

## 2020-06-12 RX ORDER — SODIUM CHLORIDE 0.9 % (FLUSH) 0.9 %
10 SYRINGE (ML) INJECTION AS NEEDED
Status: CANCELLED
Start: 2020-08-03

## 2020-06-12 RX ORDER — HEPARIN 100 UNIT/ML
300-500 SYRINGE INTRAVENOUS AS NEEDED
Status: CANCELLED
Start: 2020-08-03

## 2020-06-12 RX ORDER — ACETAMINOPHEN 325 MG/1
650 TABLET ORAL AS NEEDED
Status: CANCELLED
Start: 2020-06-22

## 2020-06-12 RX ORDER — ACETAMINOPHEN 325 MG/1
650 TABLET ORAL AS NEEDED
Status: CANCELLED
Start: 2020-07-13

## 2020-06-12 RX ORDER — DIPHENHYDRAMINE HYDROCHLORIDE 50 MG/ML
25 INJECTION, SOLUTION INTRAMUSCULAR; INTRAVENOUS AS NEEDED
Status: CANCELLED
Start: 2020-07-13

## 2020-06-12 RX ORDER — ALBUTEROL SULFATE 0.83 MG/ML
2.5 SOLUTION RESPIRATORY (INHALATION) AS NEEDED
Status: CANCELLED
Start: 2020-06-22

## 2020-06-12 RX ORDER — PALONOSETRON 0.05 MG/ML
0.25 INJECTION, SOLUTION INTRAVENOUS ONCE
Status: CANCELLED
Start: 2020-07-13

## 2020-06-12 RX ORDER — SODIUM CHLORIDE 9 MG/ML
25 INJECTION, SOLUTION INTRAVENOUS CONTINUOUS
Status: CANCELLED | OUTPATIENT
Start: 2020-07-13

## 2020-06-12 RX ORDER — DIPHENHYDRAMINE HYDROCHLORIDE 50 MG/ML
50 INJECTION, SOLUTION INTRAMUSCULAR; INTRAVENOUS
Status: CANCELLED | OUTPATIENT
Start: 2020-06-22

## 2020-06-12 RX ORDER — HYDROCORTISONE SODIUM SUCCINATE 100 MG/2ML
100 INJECTION, POWDER, FOR SOLUTION INTRAMUSCULAR; INTRAVENOUS AS NEEDED
Status: CANCELLED | OUTPATIENT
Start: 2020-07-13

## 2020-06-12 RX ORDER — SODIUM CHLORIDE 0.9 % (FLUSH) 0.9 %
10 SYRINGE (ML) INJECTION AS NEEDED
Status: CANCELLED
Start: 2020-07-13

## 2020-06-12 RX ORDER — EPINEPHRINE 1 MG/ML
0.3 INJECTION, SOLUTION, CONCENTRATE INTRAVENOUS AS NEEDED
Status: CANCELLED | OUTPATIENT
Start: 2020-08-03

## 2020-06-12 RX ORDER — ACETAMINOPHEN 325 MG/1
650 TABLET ORAL
Status: CANCELLED | OUTPATIENT
Start: 2020-08-03

## 2020-06-12 RX ORDER — DIPHENHYDRAMINE HYDROCHLORIDE 50 MG/ML
50 INJECTION, SOLUTION INTRAMUSCULAR; INTRAVENOUS AS NEEDED
Status: CANCELLED
Start: 2020-06-22

## 2020-06-12 RX ORDER — DIPHENHYDRAMINE HYDROCHLORIDE 50 MG/ML
50 INJECTION, SOLUTION INTRAMUSCULAR; INTRAVENOUS AS NEEDED
Status: CANCELLED
Start: 2020-07-13

## 2020-06-12 RX ORDER — EPINEPHRINE 1 MG/ML
0.3 INJECTION, SOLUTION, CONCENTRATE INTRAVENOUS AS NEEDED
Status: CANCELLED | OUTPATIENT
Start: 2020-07-13

## 2020-06-12 RX ORDER — DIPHENHYDRAMINE HYDROCHLORIDE 50 MG/ML
50 INJECTION, SOLUTION INTRAMUSCULAR; INTRAVENOUS AS NEEDED
Status: CANCELLED
Start: 2020-08-03

## 2020-06-12 RX ORDER — SODIUM CHLORIDE 9 MG/ML
10 INJECTION INTRAMUSCULAR; INTRAVENOUS; SUBCUTANEOUS AS NEEDED
Status: CANCELLED | OUTPATIENT
Start: 2020-08-03

## 2020-06-12 RX ORDER — ALBUTEROL SULFATE 0.83 MG/ML
2.5 SOLUTION RESPIRATORY (INHALATION) AS NEEDED
Status: CANCELLED
Start: 2020-07-13

## 2020-06-12 RX ORDER — ONDANSETRON 2 MG/ML
8 INJECTION INTRAMUSCULAR; INTRAVENOUS AS NEEDED
Status: CANCELLED | OUTPATIENT
Start: 2020-07-13

## 2020-06-12 RX ORDER — HYDROCORTISONE SODIUM SUCCINATE 100 MG/2ML
100 INJECTION, POWDER, FOR SOLUTION INTRAMUSCULAR; INTRAVENOUS AS NEEDED
Status: CANCELLED | OUTPATIENT
Start: 2020-08-03

## 2020-06-12 RX ORDER — ALBUTEROL SULFATE 0.83 MG/ML
2.5 SOLUTION RESPIRATORY (INHALATION) AS NEEDED
Status: CANCELLED
Start: 2020-08-03

## 2020-06-12 RX ORDER — ONDANSETRON 2 MG/ML
8 INJECTION INTRAMUSCULAR; INTRAVENOUS AS NEEDED
Status: CANCELLED | OUTPATIENT
Start: 2020-08-03

## 2020-06-12 RX ORDER — HEPARIN 100 UNIT/ML
300-500 SYRINGE INTRAVENOUS AS NEEDED
Status: CANCELLED
Start: 2020-07-13

## 2020-06-12 RX ORDER — ONDANSETRON 2 MG/ML
8 INJECTION INTRAMUSCULAR; INTRAVENOUS AS NEEDED
Status: CANCELLED | OUTPATIENT
Start: 2020-06-22

## 2020-06-12 RX ORDER — SODIUM CHLORIDE 9 MG/ML
25 INJECTION, SOLUTION INTRAVENOUS CONTINUOUS
Status: CANCELLED | OUTPATIENT
Start: 2020-08-03

## 2020-06-12 RX ORDER — DEXAMETHASONE SODIUM PHOSPHATE 100 MG/10ML
10 INJECTION INTRAMUSCULAR; INTRAVENOUS ONCE
Status: CANCELLED | OUTPATIENT
Start: 2020-07-13

## 2020-06-12 RX ORDER — DIPHENHYDRAMINE HYDROCHLORIDE 50 MG/ML
25 INJECTION, SOLUTION INTRAMUSCULAR; INTRAVENOUS AS NEEDED
Status: CANCELLED
Start: 2020-06-22

## 2020-06-12 RX ORDER — SODIUM CHLORIDE 9 MG/ML
10 INJECTION INTRAMUSCULAR; INTRAVENOUS; SUBCUTANEOUS AS NEEDED
Status: CANCELLED | OUTPATIENT
Start: 2020-07-13

## 2020-06-12 RX ORDER — ACETAMINOPHEN 325 MG/1
650 TABLET ORAL AS NEEDED
Status: CANCELLED
Start: 2020-08-03

## 2020-06-12 RX ORDER — DIPHENHYDRAMINE HYDROCHLORIDE 50 MG/ML
50 INJECTION, SOLUTION INTRAMUSCULAR; INTRAVENOUS
Status: CANCELLED | OUTPATIENT
Start: 2020-08-03

## 2020-06-12 RX ORDER — PALONOSETRON 0.05 MG/ML
0.25 INJECTION, SOLUTION INTRAVENOUS ONCE
Status: CANCELLED
Start: 2020-08-03

## 2020-06-12 RX ORDER — DIPHENHYDRAMINE HYDROCHLORIDE 50 MG/ML
50 INJECTION, SOLUTION INTRAMUSCULAR; INTRAVENOUS
Status: CANCELLED | OUTPATIENT
Start: 2020-07-13

## 2020-06-12 RX ORDER — HYDROCORTISONE SODIUM SUCCINATE 100 MG/2ML
100 INJECTION, POWDER, FOR SOLUTION INTRAMUSCULAR; INTRAVENOUS AS NEEDED
Status: CANCELLED | OUTPATIENT
Start: 2020-06-22

## 2020-06-12 RX ORDER — EPINEPHRINE 1 MG/ML
0.3 INJECTION, SOLUTION, CONCENTRATE INTRAVENOUS AS NEEDED
Status: CANCELLED | OUTPATIENT
Start: 2020-06-22

## 2020-06-12 RX ORDER — DEXAMETHASONE SODIUM PHOSPHATE 100 MG/10ML
10 INJECTION INTRAMUSCULAR; INTRAVENOUS ONCE
Status: CANCELLED | OUTPATIENT
Start: 2020-08-03

## 2020-06-12 RX ORDER — ACETAMINOPHEN 325 MG/1
650 TABLET ORAL
Status: CANCELLED | OUTPATIENT
Start: 2020-07-13

## 2020-06-12 RX ORDER — ACETAMINOPHEN 325 MG/1
650 TABLET ORAL
Status: CANCELLED | OUTPATIENT
Start: 2020-06-22

## 2020-06-18 DIAGNOSIS — Z17.0 MALIGNANT NEOPLASM OF CENTRAL PORTION OF LEFT BREAST IN FEMALE, ESTROGEN RECEPTOR POSITIVE (HCC): ICD-10-CM

## 2020-06-18 DIAGNOSIS — C50.112 MALIGNANT NEOPLASM OF CENTRAL PORTION OF LEFT BREAST IN FEMALE, ESTROGEN RECEPTOR POSITIVE (HCC): ICD-10-CM

## 2020-06-18 RX ORDER — DOXYCYCLINE 100 MG/1
CAPSULE ORAL
Qty: 60 CAP | Refills: 1 | Status: SHIPPED | OUTPATIENT
Start: 2020-06-18 | End: 2021-03-02

## 2020-06-22 ENCOUNTER — RESEARCH ENCOUNTER (OUTPATIENT)
Dept: ONCOLOGY | Age: 40
End: 2020-06-22

## 2020-06-22 ENCOUNTER — OFFICE VISIT (OUTPATIENT)
Dept: ONCOLOGY | Age: 40
End: 2020-06-22

## 2020-06-22 ENCOUNTER — TELEPHONE (OUTPATIENT)
Dept: ONCOLOGY | Age: 40
End: 2020-06-22

## 2020-06-22 ENCOUNTER — HOSPITAL ENCOUNTER (OUTPATIENT)
Dept: INFUSION THERAPY | Age: 40
Discharge: HOME | End: 2020-06-22
Payer: COMMERCIAL

## 2020-06-22 VITALS
HEIGHT: 64 IN | BODY MASS INDEX: 21 KG/M2 | WEIGHT: 123 LBS | TEMPERATURE: 98.8 F | OXYGEN SATURATION: 97 % | DIASTOLIC BLOOD PRESSURE: 74 MMHG | RESPIRATION RATE: 16 BRPM | HEART RATE: 58 BPM | SYSTOLIC BLOOD PRESSURE: 122 MMHG

## 2020-06-22 VITALS
WEIGHT: 123.3 LBS | RESPIRATION RATE: 16 BRPM | TEMPERATURE: 98.8 F | OXYGEN SATURATION: 97 % | BODY MASS INDEX: 21.05 KG/M2 | SYSTOLIC BLOOD PRESSURE: 126 MMHG | DIASTOLIC BLOOD PRESSURE: 74 MMHG | HEIGHT: 64 IN | HEART RATE: 49 BPM

## 2020-06-22 DIAGNOSIS — Z51.11 CHEMOTHERAPY MANAGEMENT, ENCOUNTER FOR: ICD-10-CM

## 2020-06-22 DIAGNOSIS — C50.912 STAGE II BREAST CANCER, LEFT (HCC): Primary | ICD-10-CM

## 2020-06-22 DIAGNOSIS — Z17.0 MALIGNANT NEOPLASM OF CENTRAL PORTION OF LEFT BREAST IN FEMALE, ESTROGEN RECEPTOR POSITIVE (HCC): Primary | ICD-10-CM

## 2020-06-22 DIAGNOSIS — C50.112 MALIGNANT NEOPLASM OF CENTRAL PORTION OF LEFT BREAST IN FEMALE, ESTROGEN RECEPTOR POSITIVE (HCC): Primary | ICD-10-CM

## 2020-06-22 LAB
ALBUMIN SERPL-MCNC: 4.3 G/DL (ref 3.5–5)
ALBUMIN/GLOB SERPL: 1.4 {RATIO} (ref 1.1–2.2)
ALP SERPL-CCNC: 78 U/L (ref 45–117)
ALT SERPL-CCNC: 30 U/L (ref 12–78)
ANION GAP SERPL CALC-SCNC: 7 MMOL/L (ref 5–15)
AST SERPL-CCNC: 22 U/L (ref 15–37)
BASOPHILS # BLD: 0.1 K/UL (ref 0–0.1)
BASOPHILS NFR BLD: 0 % (ref 0–1)
BILIRUB SERPL-MCNC: 0.8 MG/DL (ref 0.2–1)
BUN SERPL-MCNC: 9 MG/DL (ref 6–20)
BUN/CREAT SERPL: 12 (ref 12–20)
CALCIUM SERPL-MCNC: 9 MG/DL (ref 8.5–10.1)
CHLORIDE SERPL-SCNC: 104 MMOL/L (ref 97–108)
CO2 SERPL-SCNC: 27 MMOL/L (ref 21–32)
CREAT SERPL-MCNC: 0.73 MG/DL (ref 0.55–1.02)
DIFFERENTIAL METHOD BLD: ABNORMAL
EOSINOPHIL # BLD: 0 K/UL (ref 0–0.4)
EOSINOPHIL NFR BLD: 0 % (ref 0–7)
ERYTHROCYTE [DISTWIDTH] IN BLOOD BY AUTOMATED COUNT: 13.2 % (ref 11.5–14.5)
GLOBULIN SER CALC-MCNC: 3.1 G/DL (ref 2–4)
GLUCOSE SERPL-MCNC: 132 MG/DL (ref 65–100)
HCT VFR BLD AUTO: 35.8 % (ref 35–47)
HGB BLD-MCNC: 12.2 G/DL (ref 11.5–16)
IMM GRANULOCYTES # BLD AUTO: 0.1 K/UL (ref 0–0.04)
IMM GRANULOCYTES NFR BLD AUTO: 1 % (ref 0–0.5)
LYMPHOCYTES # BLD: 0.9 K/UL (ref 0.8–3.5)
LYMPHOCYTES NFR BLD: 7 % (ref 12–49)
MCH RBC QN AUTO: 30.7 PG (ref 26–34)
MCHC RBC AUTO-ENTMCNC: 34.1 G/DL (ref 30–36.5)
MCV RBC AUTO: 90.2 FL (ref 80–99)
MONOCYTES # BLD: 0.4 K/UL (ref 0–1)
MONOCYTES NFR BLD: 3 % (ref 5–13)
NEUTS SEG # BLD: 12.1 K/UL (ref 1.8–8)
NEUTS SEG NFR BLD: 89 % (ref 32–75)
NRBC # BLD: 0 K/UL (ref 0–0.01)
NRBC BLD-RTO: 0 PER 100 WBC
PLATELET # BLD AUTO: 202 K/UL (ref 150–400)
PMV BLD AUTO: 11 FL (ref 8.9–12.9)
POTASSIUM SERPL-SCNC: 3.8 MMOL/L (ref 3.5–5.1)
PROT SERPL-MCNC: 7.4 G/DL (ref 6.4–8.2)
RBC # BLD AUTO: 3.97 M/UL (ref 3.8–5.2)
SODIUM SERPL-SCNC: 138 MMOL/L (ref 136–145)
WBC # BLD AUTO: 13.6 K/UL (ref 3.6–11)

## 2020-06-22 PROCEDURE — 96377 APPLICATON ON-BODY INJECTOR: CPT

## 2020-06-22 PROCEDURE — 80053 COMPREHEN METABOLIC PANEL: CPT

## 2020-06-22 PROCEDURE — 96367 TX/PROPH/DG ADDL SEQ IV INF: CPT

## 2020-06-22 PROCEDURE — 96413 CHEMO IV INFUSION 1 HR: CPT

## 2020-06-22 PROCEDURE — 96417 CHEMO IV INFUS EACH ADDL SEQ: CPT

## 2020-06-22 PROCEDURE — 74011000258 HC RX REV CODE- 258: Performed by: INTERNAL MEDICINE

## 2020-06-22 PROCEDURE — 96375 TX/PRO/DX INJ NEW DRUG ADDON: CPT

## 2020-06-22 PROCEDURE — 77030012965 HC NDL HUBR BBMI -A

## 2020-06-22 PROCEDURE — 85025 COMPLETE CBC W/AUTO DIFF WBC: CPT

## 2020-06-22 PROCEDURE — 74011250636 HC RX REV CODE- 250/636: Performed by: INTERNAL MEDICINE

## 2020-06-22 PROCEDURE — 36415 COLL VENOUS BLD VENIPUNCTURE: CPT

## 2020-06-22 RX ORDER — HEPARIN 100 UNIT/ML
300-500 SYRINGE INTRAVENOUS AS NEEDED
Status: ACTIVE | OUTPATIENT
Start: 2020-06-22 | End: 2020-06-22

## 2020-06-22 RX ORDER — DEXAMETHASONE SODIUM PHOSPHATE 100 MG/10ML
10 INJECTION INTRAMUSCULAR; INTRAVENOUS ONCE
Status: COMPLETED | OUTPATIENT
Start: 2020-06-22 | End: 2020-06-22

## 2020-06-22 RX ORDER — PALONOSETRON 0.05 MG/ML
0.25 INJECTION, SOLUTION INTRAVENOUS ONCE
Status: COMPLETED | OUTPATIENT
Start: 2020-06-22 | End: 2020-06-22

## 2020-06-22 RX ORDER — SODIUM CHLORIDE 0.9 % (FLUSH) 0.9 %
10 SYRINGE (ML) INJECTION AS NEEDED
Status: DISPENSED | OUTPATIENT
Start: 2020-06-22 | End: 2020-06-22

## 2020-06-22 RX ORDER — SODIUM CHLORIDE 9 MG/ML
25 INJECTION, SOLUTION INTRAVENOUS CONTINUOUS
Status: DISPENSED | OUTPATIENT
Start: 2020-06-22 | End: 2020-06-22

## 2020-06-22 RX ORDER — SODIUM CHLORIDE 9 MG/ML
10 INJECTION INTRAMUSCULAR; INTRAVENOUS; SUBCUTANEOUS AS NEEDED
Status: ACTIVE | OUTPATIENT
Start: 2020-06-22 | End: 2020-06-22

## 2020-06-22 RX ADMIN — CARBOPLATIN 687 MG: 10 INJECTION INTRAVENOUS at 12:10

## 2020-06-22 RX ADMIN — SODIUM CHLORIDE 25 ML/HR: 900 INJECTION, SOLUTION INTRAVENOUS at 09:40

## 2020-06-22 RX ADMIN — SODIUM CHLORIDE 10 ML: 9 INJECTION INTRAMUSCULAR; INTRAVENOUS; SUBCUTANEOUS at 08:10

## 2020-06-22 RX ADMIN — Medication 500 UNITS: at 14:19

## 2020-06-22 RX ADMIN — PEGFILGRASTIM 6 MG: KIT SUBCUTANEOUS at 14:29

## 2020-06-22 RX ADMIN — DEXAMETHASONE SODIUM PHOSPHATE 10 MG: 10 INJECTION INTRAMUSCULAR; INTRAVENOUS at 09:42

## 2020-06-22 RX ADMIN — Medication 10 ML: at 08:11

## 2020-06-22 RX ADMIN — Medication 10 ML: at 14:18

## 2020-06-22 RX ADMIN — TRASTUZUMAB 344 MG: 150 INJECTION, POWDER, LYOPHILIZED, FOR SOLUTION INTRAVENOUS at 12:55

## 2020-06-22 RX ADMIN — PERTUZUMAB 420 MG: 30 INJECTION, SOLUTION, CONCENTRATE INTRAVENOUS at 13:35

## 2020-06-22 RX ADMIN — PALONOSETRON HYDROCHLORIDE 0.25 MG: 0.25 INJECTION INTRAVENOUS at 09:46

## 2020-06-22 RX ADMIN — FOSAPREPITANT 150 MG: 150 INJECTION, POWDER, LYOPHILIZED, FOR SOLUTION INTRAVENOUS at 09:50

## 2020-06-22 RX ADMIN — DOCETAXEL ANHYDROUS 121 MG: 10 INJECTION, SOLUTION INTRAVENOUS at 11:00

## 2020-06-22 NOTE — PROGRESS NOTES
[facility] [place] [facility] [place] [address] [facility] [address] [place]: [phone]  F: [phone]      Reason for Visit:   Melisa Heredia is a 38 y.o. female who is seen in consultation at the request of Dr. Sanjana Floyd for evaluation of therapy for breast cancer    Treatment History:   · 20 left breast ax core bx: Adenocarcinoma, ER + at 86% ; CT + at 69%; HER 2 POSITIVE (IHC 2+, FISH ratio 3.2; sig/cell 6.08), ki67 29%  · 20 L breast core bx: Atypical 1 mm focus, highly suspicious for [facility], lobular features, the tumor does not histologically match the patient's prior axillary cancer, but could represent a small sample of the same tumor  · 20 L breast excisional bx: Subareolar lumpectomy, IDC, 1.6 cm, invasive tumor present at anterior and lateral margins, gr 2, + LVI,  LN involved, 1.1 cm  · 20 [facility] genetic testing:  negative  · TCHP 2020-     History of Present Illness:   She noticed a L axilla mass in 2020, leading to the pathology above. Interval history:  In today for follow up and treatment. Complains of gr 1 constipation, gr 1 diarrhea, gr 1 nausea, gr 1 hot flashes, gr 1 anxiety, gr 1 headache.      FH:  No breast, ovarian, prostate, or pancreas cancer    Past Medical History:   Diagnosis Date    Anxiety     Cancer [facility])     Essential hypertension     HX OTHER MEDICAL ,         Infertility     IVF with first pregnancy    Infertility, female     Joint pain     Psychiatric problem       Past Surgical History:   Procedure Laterality Date    HX OTHER SURGICAL      [place]wing Teeth Removal    HX OTHER SURGICAL      2017 Excision of mole on toe with skin graph      Social History     Tobacco Use    Smoking status: Never Smoker    Smokeless tobacco: Never Used   Substance Use Topics    Alcohol use: Yes     Frequency: 4 or more times a week      Family History   Problem Relation Age of Onset    Diabetes Mother     Heart Disease Mother     Hypertension Mother     Hypertension Father     Cancer Paternal Grandmother         Lung cancer    Stroke Paternal Grandmother     Cancer Paternal Grandfather         Melanoma     Current Outpatient Medications   Medication Sig    magic mouthwash solution Magic mouth wash   Maalox  Lidocaine 2% viscous   Diphenhydramine oral solution     Pharmacy to mix equal portions of ingredients to a total volume as indicated in the dispense amount. 10-15 ml swish/spit and may swallow for throat pain every 4 hours PRN.  doxycycline (VIBRAMYCIN) 100 mg capsule TAKE 1 CAPSULE BY MOUTH TWICE A DAY *INS LIMITS 30 DAY    prochlorperazine (COMPAZINE) 10 mg tablet Take 1 Tab by mouth every six (6) hours as needed for Nausea.  dexAMETHasone (DECADRON) 4 mg tablet 8 mg bid the day before and day after chemo    lidocaine-prilocaine (EMLA) topical cream Apply  to affected area as needed for Pain.  ondansetron (ZOFRAN ODT) 8 mg disintegrating tablet Take 1 Tab by mouth every eight (8) hours as needed for Nausea or Vomiting. For 2 days following chemo    metoprolol tartrate (LOPRESSOR) 50 mg tablet Take  by mouth two (2) times a day.  ibuprofen (MOTRIN) 600 mg tablet Take 1 Tab by mouth every six (6) hours as needed for Pain.  sertraline (ZOLOFT) 25 mg tablet Take 25 mg by mouth daily.  PNV No.22-Iron Cbn&Gluc-FA-DOS 27-1-50 mg Tab Take  by mouth. Indications: PREGNANCY     No current facility-administered medications for this visit. Facility-Administered Medications Ordered in Other Visits   Medication Dose Route Frequency    saline peripheral flush soln 10 mL  10 mL InterCATHeter PRN    0.9% sodium chloride injection 10 mL  10 mL IntraVENous PRN    heparin (porcine) pf 300-500 Units  300-500 Units InterCATHeter PRN      No Known Allergies     Review of Systems: A complete review of systems was obtained, negative except as described above.     Physical Exam:     Visit Vitals  BP 122/74 (BP 1 Location: Left arm, BP Patient Position: Sitting)   Pulse (!) 58   Temp 98.8 °F (37.1 °C) (Temporal)   Resp 16   Ht 5' 4\" (1.626 m)   Wt 123 lb (55.8 kg)   SpO2 97%   BMI 21.11 kg/m²     ECOG PS: 0    Constitutional: Appears well-developed and well-nourished in no apparent distress      Mental status: Alert and awake, Oriented to person/place/time, Able to follow commands    Eyes: EOM normal, Sclera normal, No visible ocular discharge    HENT: Normocephalic, atraumatic    Mouth/Throat: Moist mucous membranes    External Ears normal    Neck: No visualized mass    Pulmonary/Chest: Respiratory effort normal, No visualized signs of difficulty breathing or respiratory distress    Musculoskeletal: Normal gait with no signs of ataxia, Normal range of motion of neck    Neurological: No facial asymmetry (Cranial nerve 7 motor function), No gaze palsy    Skin: No significant exanthematous lesions or discoloration noted on facial skin    Psychiatric: Normal affect, No hallucinations     Breasts: L breast 1 cm subareolar mass;  2 cm L ax LN, 1 cm L ax LN        Results:     Lab Results   Component Value Date/Time    WBC 13.6 (H) 06/22/2020 07:56 AM    HGB 12.2 06/22/2020 07:56 AM    HCT 35.8 06/22/2020 07:56 AM    PLATELET 130 90/06/3199 07:56 AM    MCV 90.2 06/22/2020 07:56 AM    ABS. NEUTROPHILS 12.1 (H) 06/22/2020 07:56 AM     Lab Results   Component Value Date/Time    Sodium 137 06/01/2020 08:05 AM    Potassium 3.8 06/01/2020 08:05 AM    Chloride 103 06/01/2020 08:05 AM    CO2 25 06/01/2020 08:05 AM    Glucose 147 (H) 06/01/2020 08:05 AM    BUN 11 06/01/2020 08:05 AM    Creatinine 0.80 06/01/2020 08:05 AM    GFR est AA >60 06/01/2020 08:05 AM    GFR est non-AA >60 06/01/2020 08:05 AM    Calcium 8.9 06/01/2020 08:05 AM     Lab Results   Component Value Date/Time    Bilirubin, total 0.6 06/01/2020 08:05 AM    ALT (SGPT) 23 06/01/2020 08:05 AM    Alk.  phosphatase 51 06/01/2020 08:05 AM    Protein, total 7.6 06/01/2020 08:05 AM    Albumin 4.3 06/01/2020 08:05 AM    Globulin 3.3 06/01/2020 08:05 AM     5/20/20 breast  MRI  Subareolar region of L breast 1.2 cm mass, at least 2 LN on left, 3 cm largest  Right breast negative    5/20/20 bone scan  Heterogeneous activity in the ribs of uncertain significance. This would be an unusual presentation of bony metastatic disease    5/20/20 CT c/a/p  negative    Records reviewed and summarized above. Pathology report(s) reviewed above. Radiology report(s) reviewed above. Assessment/plan:   1.  L breast cancer, LN + by core, ER +, FL +, HER 2 POSITIVE, cT1c cN1a cM0:  Stage IIA, prognostic stage IB    We explained to the patient that the goal of systemic adjuvant therapy is to improve the chances for cure and decrease the risk of relapse. We explained why a patient can have microscopic cancer spread now even though physical examination, laboratory studies and imaging studies are negative for cancer. We explained that the same treatments used now as adjuvant or preventive treatments rarely if ever are curative in women who develop metastases. We discussed that there is no difference in overall survival between neoadjuvant and adjuvant chemotherapy. We discussed the rationale for neoadjuvant chemotherapy, if chemotherapy is warranted, as it is in this case: to avoid any potential delays in giving chemotherapy following surgery, to be able to see the response of the tumor to chemotherapy, and to potentially downstage the tumor prior to surgery. Barbie Gip suggests equivalency between q.3 week Adriamycin, Cytoxan followed by weekly paclitaxel and trastuzumab compared with the [facility] regimen. However, this study was not powered to show a difference between these two regimens, and in the NEJ publication, the AC-TH arm showed a numerically advantange (though not statistically significant) to the [facility] arm.   In this patient, it is completely reasonable to use [facility] approach and avoid the potential cardiotoxicity of the anthracyclines as well as the potential for leukemia. We discussed the toxicities of docetaxel and carboplatin chemotherapy in detail. This chemotherapy frequently causes a low white blood cell count and hospital admissions for treatment of neutropenic fever. We explained that we consider the use of growth factors to minimize this risk. We explained to the patient that some side effects if they occur only last a few days including nausea, vomiting, stomatitis, arthralgia, myalgia,and allergic reactions to Taxotere. We told the patient that severe nausea and vomiting were uncommon and that some side effects,if they occur, will last longer; this includes hair loss, which will be seen in all patients treated with these agents and fatigue,which will be seen in most.  We also informed that for the patient that heart damage is rare with these agents. We explained that carboplatin can rarely cause kidney damage and high frequency hearing loss. We provided the patient in detail her information concerning the toxicities of this regimen in addition to her overall discussion. Rationale for therapy with trastuzumab was also discussed with the patient including a 50% proportional improvement in disease free survival and also an improvement in overall survival in patients receiving trastuzumab and chemotherapy for HER-2 positive breast cancer. The side effects of trastuzumab were discussed including a 4%-5% risk of dropping her ejection fraction while on treatment and about a 1% risk of CHF. We discussed that this drug will be used every 3 weeks for remainder of a year following the chemotherapy cycles. We will check her EF before chemotherapy and every 3 months while she is receiving trastuzumab.     Rational for therapy with pertuzumab was also discussed with the patient including a nearly 20% improvement seen in pCR in the neoadjuvant setting with the addition of this medication. Additional AE discussed including an acne rash (and the use of doxycyline 100 mg bid to help prevent) and diarrhea and consideration of additional cardiomyopathy. · TCH-P (Trastuzumab 8mg/kg load with cycle 1 then 6mg/kg, Docetaxel 75mg/m2, Carboplatin AUC 6, Pertuzumab 840mg load with cycle 1 then 420mg) given every 3 weeks x 6 cycles  · Labs: CBC, CMP prior to each treatment. · Antiemetic Prophylaxis: Palonosetron and dexamethasone prior to chemo  · PRN Antiemetics: Ondansetron, Compazine  · Swelling prophylaxis: Dexmethasone 8mg bid the day before and day after chemotherapy  · TTE prior to chemotherapy and every 12 weeks while on Trastuzumab  · Neulasta 24-72 hours after each treatment    Cold caps discussed. She is interested and got fitted and will use    Oral and peripheral cryotherapy discussed. Peripheral neuropathy trial discussed and research met with her (essential trial for her) and she has consented. Screened by me for compass HER 2 de-escalation study, but her primary tumor is not > 1.5 cm    Discussed outback HP if pCR, if no pCR, discussed T-DM1     After this discussion, she is agreeable to TCH-P as above, she has signed informed consent. TTE on 5/28/2020 EF 64%. Port placed by Dr. Gisela Hansen on 5/27/20     Breast mass biopsied and clipped on 5/21/20, also had surgical biopsy on 5/27/2020, will obtain results. She has follow up with Dr. Annemarie Bravo on 6/3/2020. The patient was given presciptions for a wig, emla cream, decadron to take 8 mg bid the day before and day after chemo, zofran and compazine. Neulasta the following day. Will proceed with TCHP #2 today. IUD was removed by gyn. Will ask for receptors to be done on 5/27/20 lumpectomy specimen    2. Emotional well being:  She has excellent support and is coping well with her disease    3.  Genetic testing:  discussed potential ramifications of a positive test including the potential need for bilateral mastectomies and bilateral oophorectomies and the risk then for her family members to also have a mutation. VUS also discussed. Tested in Dr. Sandra Erwin office on 5/21/20, negative, will obtain results. 4. Loss of fertility:  Discussed potential loss of menses and fertility. She is not interested in having further children. Declines oncofertility consult    5. Bone pain: Due to neulasta. Recommend claritin daily. Mild improvement tylenol PRN. Recommend Ibuprofen BID for 3 days along with tylenol PRN. 6. Diarrhea: Due to chemo. Managed by Imodium PRN. 7. Throat pain:  Magic mouthwash PRN, rx in. I appreciate the opportunity to participate in Ms. Tarik Mathews care. Signed By: Caesar Severance, MD      No orders of the defined types were placed in this encounter.

## 2020-06-22 NOTE — PROGRESS NOTES
Pt in for labs and follow up visit today per protocol with Dr. Messi Chaudhari and RN. This is week 4 of treatment. Vital signs are stable. Patient to go to infusion center after appointment to receive [facility]. Assessments and QOLs performed per protocol. Next appt is scheduled for 7/13/20.

## 2020-06-22 NOTE — TELEPHONE ENCOUNTER
6/22/2020 9:37 AM: Per request of Rell Lemus NP, called in prescription for magic mouthwash solution to patient's preferred pharmacy. Paper prescription was shredded and discarded.

## 2020-06-22 NOTE — PROGRESS NOTES
Sly Kincaid is a 38 y.o. female Follow up for the Evaluation of Breast Cancer. 1. Have you been to the ER, urgent care clinic since your last visit? Hospitalized since your last visit? No    2. Have you seen or consulted any other health care providers outside of the [address] [place] [address] since your last visit? Include any pap smears or colon screening.  No

## 2020-06-22 NOTE — PROGRESS NOTES
Outpatient Infusion Center - Chemotherapy Progress Note    0745 Pt admit to [facility] for C2D1 [facility] ambulatory in stable condition. Assessment completed. No new concerns voiced. PAC with positive blood return. Labs were drawn and pt sent for processing. Research labs given directly to research nurse. CBCAP did not result. Sample sent to [facility] for processing. Echo completed 5/28. EF=64%. Requested order for ok to give TC before HP because pt uses cold cap. OK order received at [address] [place] [address]     Chemotherapy Flowsheet 6/22/2020   Cycle C2D1   Date 6/22/2020   Drug / Regimen TCHP         VS  Patient Vitals for the past 12 hrs:   Temp Pulse Resp BP SpO2   06/22/20 1421  (!) 49  126/74    06/22/20 0746 98.8 °F (37.1 °C) (!) 58 16 122/74 97 %         Pt denies pregnancy    Medications:  Medications Administered     0.9% sodium chloride infusion     Admin Date  06/22/2020 Action  New Bag Dose  25 mL/hr Rate  25 mL/hr Route  IntraVENous Administered By  Debbie Vu RN          0.9% sodium chloride injection 10 mL     Admin Date  06/22/2020 Action  Given Dose  10 mL Route  IntraVENous Administered By  Debbie Vu RN          CARBOplatin (PARAPLATIN) 687 mg in 0.9% sodium chloride 250 mL, overfill volume 25 mL chemo infusion     Admin Date  06/22/2020 Action  New Bag Dose  687 mg Rate  687.4 mL/hr Route  IntraVENous Administered By  Debbie Vu RN          dexamethasone (DECADRON) 10 mg/mL injection 10 mg     Admin Date  06/22/2020 Action  Given Dose  10 mg Route  IntraVENous Administered By  Debbie Vu RN          DOCEtaxeL (TAXOTERE) 121 mg in 0.9% sodium chloride 250 mL, overfill volume 25 mL chemo infusion     Admin Date  06/22/2020 Action  New Bag Dose  121 mg Route  IntraVENous Administered By  Debbie Vu RN          fosaprepitant (EMEND) 150 mg in 0.9% sodium chloride 150 mL IVPB     Admin Date  06/22/2020 Action  Given Dose  150 mg Rate  450 mL/hr Route  IntraVENous Administered By  Ilana Jimenez RN          heparin (porcine) pf 300-500 Units     Admin Date  06/22/2020 Action  Given Dose  500 Units Route  InterCATHeter Administered By  Ilana Jimenez RN          palonosetron HCl (ALOXI) injection 0.25 mg     Admin Date  06/22/2020 Action  Given Dose  0.25 mg Route  IntraVENous Administered By  Ilana Jimenez RN          pegfilgrastim (NEULASTA) wearable SQ injector 6 mg     Admin Date  06/22/2020 Action  Given Dose  6 mg Route  SubCUTAneous Administered By  Ilana Jimenez RN          pertuzumab (PERJETA) 420 mg in 0.9% sodium chloride 250 mL, overfill volume 25 mL IVPB     Admin Date  06/22/2020 Action  New Bag Dose  420 mg Rate  578 mL/hr Route  IntraVENous Administered By  Ilana Jimenez RN          saline peripheral flush soln 10 mL     Admin Date  06/22/2020 Action  Given Dose  10 mL Route  InterCATHeter Administered By  Ilana Jimenez RN           Admin Date  06/22/2020 Action  Given Dose  10 mL Route  InterCATHeter Administered By  Ilana Jimenez RN          trastuzumab (HERCEPTIN) 344 mg in 0.9% sodium chloride 250 mL, overfill volume 25 mL IVPB     Admin Date  06/22/2020 Action  New Bag Dose  344 mg Rate  582.8 mL/hr Route  IntraVENous Administered By  Ilana Jimenez RN                   Patient participated in Cold Cap therapy during treatment:  Prior to cooling the patient/family dampened hair with water and applied hair conditioner to improve scalp contact and reduce the insulation effect of hair. Prior to cooling the patient/family dampened hair with water and applied hair conditioner to improve scalp contact and reduce the insulation effect of hair. Pre-cooling time: Cold cap therapy initiated 30 minutes prior to infusion. Cold cap therapy continued during taxol infusion.      Post infusion cooling time: Cold cap therapy continued 90 minutes post infusion. Education provided to patient about Neulasta On Body Injector including: side effects, how/when to remove the device, as well as what to do in the event of device malfunction. Opportunity for questions was provided and all questions were answered. Patient verbalized understanding. 1440 Pt tolerated treatment well. PAC maintained positive blood return throughout treatment, flushed with positive blood return at conclusion. D/c home ambulatory in no distress. Pt aware of next appointment scheduled for 7/13 at 0730. Labs  Recent Results (from the past 12 hour(s))   METABOLIC PANEL, COMPREHENSIVE    Collection Time: 06/22/20  7:56 AM   Result Value Ref Range    Sodium 138 136 - 145 mmol/L    Potassium 3.8 3.5 - 5.1 mmol/L    Chloride 104 97 - 108 mmol/L    CO2 27 21 - 32 mmol/L    Anion gap 7 5 - 15 mmol/L    Glucose 132 (H) 65 - 100 mg/dL    BUN 9 6 - 20 MG/DL    Creatinine 0.73 0.55 - 1.02 MG/DL    BUN/Creatinine ratio 12 12 - 20      GFR est AA >60 >60 ml/min/1.73m2    GFR est non-AA >60 >60 ml/min/1.73m2    Calcium 9.0 8.5 - 10.1 MG/DL    Bilirubin, total 0.8 0.2 - 1.0 MG/DL    ALT (SGPT) 30 12 - 78 U/L    AST (SGOT) 22 15 - 37 U/L    Alk.  phosphatase 78 45 - 117 U/L    Protein, total 7.4 6.4 - 8.2 g/dL    Albumin 4.3 3.5 - 5.0 g/dL    Globulin 3.1 2.0 - 4.0 g/dL    A-G Ratio 1.4 1.1 - 2.2     CBC WITH AUTOMATED DIFF    Collection Time: 06/22/20  7:56 AM   Result Value Ref Range    WBC 13.6 (H) 3.6 - 11.0 K/uL    RBC 3.97 3.80 - 5.20 M/uL    HGB 12.2 11.5 - 16.0 g/dL    HCT 35.8 35.0 - 47.0 %    MCV 90.2 80.0 - 99.0 FL    MCH 30.7 26.0 - 34.0 PG    MCHC 34.1 30.0 - 36.5 g/dL    RDW 13.2 11.5 - 14.5 %    PLATELET 308 609 - 563 K/uL    MPV 11.0 8.9 - 12.9 FL    NRBC 0.0 0  WBC    ABSOLUTE NRBC 0.00 0.00 - 0.01 K/uL    NEUTROPHILS 89 (H) 32 - 75 %    LYMPHOCYTES 7 (L) 12 - 49 %    MONOCYTES 3 (L) 5 - 13 %    EOSINOPHILS 0 0 - 7 %    BASOPHILS 0 0 - 1 %    IMMATURE GRANULOCYTES 1 (H) 0.0 - 0.5 %    ABS. NEUTROPHILS 12.1 (H) 1.8 - 8.0 K/UL    ABS. LYMPHOCYTES 0.9 0.8 - 3.5 K/UL    ABS. MONOCYTES 0.4 0.0 - 1.0 K/UL    ABS. EOSINOPHILS 0.0 0.0 - 0.4 K/UL    ABS. BASOPHILS 0.1 0.0 - 0.1 K/UL    ABS. IMM.  GRANS. 0.1 (H) 0.00 - 0.04 K/UL    DF AUTOMATED

## 2020-06-25 ENCOUNTER — TELEPHONE (OUTPATIENT)
Dept: ONCOLOGY | Age: 40
End: 2020-06-25

## 2020-06-30 ENCOUNTER — TELEPHONE (OUTPATIENT)
Dept: ONCOLOGY | Age: 40
End: 2020-06-30

## 2020-06-30 NOTE — TELEPHONE ENCOUNTER
6/30/2020 2:54 PM: [facility] pathology to check on status of receptors on 5/27 specimen. RN was told that it reported out and faxed to this office. Advised that this office has not received the report and requested that they send it again to [phone]. Representative stated they will send it again.

## 2020-07-13 ENCOUNTER — DOCUMENTATION ONLY (OUTPATIENT)
Dept: ONCOLOGY | Age: 40
End: 2020-07-13

## 2020-07-13 ENCOUNTER — RESEARCH ENCOUNTER (OUTPATIENT)
Dept: ONCOLOGY | Age: 40
End: 2020-07-13

## 2020-07-13 ENCOUNTER — OFFICE VISIT (OUTPATIENT)
Dept: ONCOLOGY | Age: 40
End: 2020-07-13

## 2020-07-13 ENCOUNTER — HOSPITAL ENCOUNTER (OUTPATIENT)
Dept: INFUSION THERAPY | Age: 40
Discharge: HOME | End: 2020-07-13
Payer: COMMERCIAL

## 2020-07-13 VITALS
WEIGHT: 122.6 LBS | TEMPERATURE: 96.9 F | BODY MASS INDEX: 20.93 KG/M2 | SYSTOLIC BLOOD PRESSURE: 123 MMHG | OXYGEN SATURATION: 98 % | HEIGHT: 64 IN | RESPIRATION RATE: 14 BRPM | DIASTOLIC BLOOD PRESSURE: 76 MMHG | HEART RATE: 52 BPM

## 2020-07-13 VITALS
HEIGHT: 64 IN | BODY MASS INDEX: 20.83 KG/M2 | HEART RATE: 60 BPM | TEMPERATURE: 96.9 F | RESPIRATION RATE: 16 BRPM | SYSTOLIC BLOOD PRESSURE: 140 MMHG | OXYGEN SATURATION: 98 % | WEIGHT: 122 LBS | DIASTOLIC BLOOD PRESSURE: 65 MMHG

## 2020-07-13 DIAGNOSIS — Z51.11 CHEMOTHERAPY MANAGEMENT, ENCOUNTER FOR: ICD-10-CM

## 2020-07-13 DIAGNOSIS — C50.112 MALIGNANT NEOPLASM OF CENTRAL PORTION OF LEFT BREAST IN FEMALE, ESTROGEN RECEPTOR POSITIVE (HCC): Primary | ICD-10-CM

## 2020-07-13 DIAGNOSIS — Z17.0 MALIGNANT NEOPLASM OF CENTRAL PORTION OF LEFT BREAST IN FEMALE, ESTROGEN RECEPTOR POSITIVE (HCC): Primary | ICD-10-CM

## 2020-07-13 DIAGNOSIS — C50.912 STAGE II BREAST CANCER, LEFT (HCC): Primary | ICD-10-CM

## 2020-07-13 LAB
ALBUMIN SERPL-MCNC: 4.3 G/DL (ref 3.5–5)
ALBUMIN/GLOB SERPL: 1.3 {RATIO} (ref 1.1–2.2)
ALP SERPL-CCNC: 76 U/L (ref 45–117)
ALT SERPL-CCNC: 57 U/L (ref 12–78)
ANION GAP SERPL CALC-SCNC: 6 MMOL/L (ref 5–15)
AST SERPL-CCNC: 29 U/L (ref 15–37)
BASOPHILS # BLD: 0 K/UL (ref 0–0.1)
BASOPHILS NFR BLD: 0 % (ref 0–1)
BILIRUB SERPL-MCNC: 0.7 MG/DL (ref 0.2–1)
BUN SERPL-MCNC: 10 MG/DL (ref 6–20)
BUN/CREAT SERPL: 14 (ref 12–20)
CALCIUM SERPL-MCNC: 9.1 MG/DL (ref 8.5–10.1)
CHLORIDE SERPL-SCNC: 105 MMOL/L (ref 97–108)
CO2 SERPL-SCNC: 27 MMOL/L (ref 21–32)
CREAT SERPL-MCNC: 0.7 MG/DL (ref 0.55–1.02)
DIFFERENTIAL METHOD BLD: ABNORMAL
EOSINOPHIL # BLD: 0 K/UL (ref 0–0.4)
EOSINOPHIL NFR BLD: 0 % (ref 0–7)
ERYTHROCYTE [DISTWIDTH] IN BLOOD BY AUTOMATED COUNT: 15.5 % (ref 11.5–14.5)
GLOBULIN SER CALC-MCNC: 3.3 G/DL (ref 2–4)
GLUCOSE SERPL-MCNC: 133 MG/DL (ref 65–100)
HCT VFR BLD AUTO: 32.7 % (ref 35–47)
HGB BLD-MCNC: 11.3 G/DL (ref 11.5–16)
IMM GRANULOCYTES # BLD AUTO: 0 K/UL (ref 0–0.04)
IMM GRANULOCYTES NFR BLD AUTO: 0 % (ref 0–0.5)
LYMPHOCYTES # BLD: 0.7 K/UL (ref 0.8–3.5)
LYMPHOCYTES NFR BLD: 8 % (ref 12–49)
MCH RBC QN AUTO: 31.7 PG (ref 26–34)
MCHC RBC AUTO-ENTMCNC: 34.6 G/DL (ref 30–36.5)
MCV RBC AUTO: 91.6 FL (ref 80–99)
MONOCYTES # BLD: 0.5 K/UL (ref 0–1)
MONOCYTES NFR BLD: 5 % (ref 5–13)
NEUTS SEG # BLD: 7.8 K/UL (ref 1.8–8)
NEUTS SEG NFR BLD: 87 % (ref 32–75)
NRBC # BLD: 0 K/UL (ref 0–0.01)
NRBC BLD-RTO: 0 PER 100 WBC
PLATELET # BLD AUTO: 142 K/UL (ref 150–400)
PMV BLD AUTO: 10.1 FL (ref 8.9–12.9)
POTASSIUM SERPL-SCNC: 3.8 MMOL/L (ref 3.5–5.1)
PROT SERPL-MCNC: 7.6 G/DL (ref 6.4–8.2)
RBC # BLD AUTO: 3.57 M/UL (ref 3.8–5.2)
RBC MORPH BLD: ABNORMAL
SODIUM SERPL-SCNC: 138 MMOL/L (ref 136–145)
WBC # BLD AUTO: 9 K/UL (ref 3.6–11)

## 2020-07-13 PROCEDURE — 96413 CHEMO IV INFUSION 1 HR: CPT

## 2020-07-13 PROCEDURE — 77030016057 HC NDL HUBR APOL -B

## 2020-07-13 PROCEDURE — 74011250636 HC RX REV CODE- 250/636: Performed by: INTERNAL MEDICINE

## 2020-07-13 PROCEDURE — 36415 COLL VENOUS BLD VENIPUNCTURE: CPT

## 2020-07-13 PROCEDURE — 96417 CHEMO IV INFUS EACH ADDL SEQ: CPT

## 2020-07-13 PROCEDURE — 96375 TX/PRO/DX INJ NEW DRUG ADDON: CPT

## 2020-07-13 PROCEDURE — 80053 COMPREHEN METABOLIC PANEL: CPT

## 2020-07-13 PROCEDURE — 74011000258 HC RX REV CODE- 258: Performed by: INTERNAL MEDICINE

## 2020-07-13 PROCEDURE — 96377 APPLICATON ON-BODY INJECTOR: CPT

## 2020-07-13 PROCEDURE — 96367 TX/PROPH/DG ADDL SEQ IV INF: CPT

## 2020-07-13 PROCEDURE — 85025 COMPLETE CBC W/AUTO DIFF WBC: CPT

## 2020-07-13 PROCEDURE — 74011000250 HC RX REV CODE- 250: Performed by: INTERNAL MEDICINE

## 2020-07-13 RX ORDER — SODIUM CHLORIDE 0.9 % (FLUSH) 0.9 %
10 SYRINGE (ML) INJECTION AS NEEDED
Status: DISPENSED | OUTPATIENT
Start: 2020-07-13 | End: 2020-07-13

## 2020-07-13 RX ORDER — SODIUM CHLORIDE 9 MG/ML
10 INJECTION INTRAMUSCULAR; INTRAVENOUS; SUBCUTANEOUS AS NEEDED
Status: ACTIVE | OUTPATIENT
Start: 2020-07-13 | End: 2020-07-13

## 2020-07-13 RX ORDER — PALONOSETRON 0.05 MG/ML
0.25 INJECTION, SOLUTION INTRAVENOUS ONCE
Status: COMPLETED | OUTPATIENT
Start: 2020-07-13 | End: 2020-07-13

## 2020-07-13 RX ORDER — DEXAMETHASONE SODIUM PHOSPHATE 100 MG/10ML
10 INJECTION INTRAMUSCULAR; INTRAVENOUS ONCE
Status: COMPLETED | OUTPATIENT
Start: 2020-07-13 | End: 2020-07-13

## 2020-07-13 RX ORDER — SODIUM CHLORIDE 9 MG/ML
25 INJECTION, SOLUTION INTRAVENOUS CONTINUOUS
Status: DISPENSED | OUTPATIENT
Start: 2020-07-13 | End: 2020-07-13

## 2020-07-13 RX ORDER — HEPARIN 100 UNIT/ML
300-500 SYRINGE INTRAVENOUS AS NEEDED
Status: ACTIVE | OUTPATIENT
Start: 2020-07-13 | End: 2020-07-13

## 2020-07-13 RX ADMIN — HEPARIN 500 UNITS: 100 SYRINGE at 14:02

## 2020-07-13 RX ADMIN — SODIUM CHLORIDE 10 ML: 9 INJECTION, SOLUTION INTRAMUSCULAR; INTRAVENOUS; SUBCUTANEOUS at 14:02

## 2020-07-13 RX ADMIN — TRASTUZUMAB 344 MG: 150 INJECTION, POWDER, LYOPHILIZED, FOR SOLUTION INTRAVENOUS at 10:08

## 2020-07-13 RX ADMIN — PERTUZUMAB 420 MG: 30 INJECTION, SOLUTION, CONCENTRATE INTRAVENOUS at 10:46

## 2020-07-13 RX ADMIN — PALONOSETRON HYDROCHLORIDE 0.25 MG: 0.25 INJECTION INTRAVENOUS at 11:20

## 2020-07-13 RX ADMIN — PEGFILGRASTIM 6 MG: KIT SUBCUTANEOUS at 14:05

## 2020-07-13 RX ADMIN — SODIUM CHLORIDE 25 ML/HR: 900 INJECTION, SOLUTION INTRAVENOUS at 10:00

## 2020-07-13 RX ADMIN — CARBOPLATIN 707 MG: 10 INJECTION INTRAVENOUS at 13:27

## 2020-07-13 RX ADMIN — SODIUM CHLORIDE 10 ML: 9 INJECTION, SOLUTION INTRAMUSCULAR; INTRAVENOUS; SUBCUTANEOUS at 08:02

## 2020-07-13 RX ADMIN — DOCETAXEL ANHYDROUS 121 MG: 10 INJECTION, SOLUTION INTRAVENOUS at 12:18

## 2020-07-13 RX ADMIN — Medication 10 ML: at 08:02

## 2020-07-13 RX ADMIN — DEXAMETHASONE SODIUM PHOSPHATE 10 MG: 10 INJECTION INTRAMUSCULAR; INTRAVENOUS at 11:23

## 2020-07-13 RX ADMIN — FOSAPREPITANT 150 MG: 150 INJECTION, POWDER, LYOPHILIZED, FOR SOLUTION INTRAVENOUS at 11:25

## 2020-07-13 NOTE — PROGRESS NOTES
Roney Maldonado is a 38 y.o. female Follow up for the Evaluation of Breast Cancer. 1. Have you been to the ER, urgent care clinic since your last visit? Hospitalized since your last visit? No    2. Have you seen or consulted any other health care providers outside of the [address] [place] [address] since your last visit? Include any pap smears or colon screening.  No

## 2020-07-13 NOTE — PROGRESS NOTES
\[facility] [place] [facility]\"" Progress Note    Date: 2020    Name: Kimberlee Pope    MRN: [id number]         : 1980    Ms. Felton Rasheed Arrived ambulatory and in no distress for C3D1 of TCHP Regimen. Assessment was completed, no acute issues at this time, no new complaints voiced. Right chest wall port accessed without difficulty, labs drawn & sent for processing. Confirmed pt took dexamethasone yesterday as prescribed. Patient proceed to appointment with Dr. Vijay Alonso. Ms. Mulligan's vitals were reviewed. Patient Vitals for the past 12 hrs:   Temp Pulse Resp BP SpO2   20 0750 96.9 °F (36.1 °C) 60 16 140/65 98 %         Lab results were obtained and reviewed. Echo reviewed from 20. EF 64% and within parameters. Recent Results (from the past 12 hour(s))   METABOLIC PANEL, COMPREHENSIVE    Collection Time: 20  8:02 AM   Result Value Ref Range    Sodium 138 136 - 145 mmol/L    Potassium 3.8 3.5 - 5.1 mmol/L    Chloride 105 97 - 108 mmol/L    CO2 27 21 - 32 mmol/L    Anion gap 6 5 - 15 mmol/L    Glucose 133 (H) 65 - 100 mg/dL    BUN 10 6 - 20 MG/DL    Creatinine 0.70 0.55 - 1.02 MG/DL    BUN/Creatinine ratio 14 12 - 20      GFR est AA >60 >60 ml/min/1.73m2    GFR est non-AA >60 >60 ml/min/1.73m2    Calcium 9.1 8.5 - 10.1 MG/DL    Bilirubin, total 0.7 0.2 - 1.0 MG/DL    ALT (SGPT) 57 12 - 78 U/L    AST (SGOT) 29 15 - 37 U/L    Alk.  phosphatase 76 45 - 117 U/L    Protein, total 7.6 6.4 - 8.2 g/dL    Albumin 4.3 3.5 - 5.0 g/dL    Globulin 3.3 2.0 - 4.0 g/dL    A-G Ratio 1.3 1.1 - 2.2     CBC WITH AUTOMATED DIFF    Collection Time: 20  8:02 AM   Result Value Ref Range    WBC 9.0 3.6 - 11.0 K/uL    RBC 3.57 (L) 3.80 - 5.20 M/uL    HGB 11.3 (L) 11.5 - 16.0 g/dL    HCT 32.7 (L) 35.0 - 47.0 %    MCV 91.6 80.0 - 99.0 FL    MCH 31.7 26.0 - 34.0 PG    MCHC 34.6 30.0 - 36.5 g/dL    RDW 15.5 (H) 11.5 - 14.5 %    PLATELET 279 (L) 339 - 400 K/uL    MPV 10.1 8.9 - 12.9 FL    NRBC 0.0 0  WBC    ABSOLUTE NRBC 0.00 0.00 - 0.01 K/uL    NEUTROPHILS 87 (H) 32 - 75 %    LYMPHOCYTES 8 (L) 12 - 49 %    MONOCYTES 5 5 - 13 %    EOSINOPHILS 0 0 - 7 %    BASOPHILS 0 0 - 1 %    IMMATURE GRANULOCYTES 0 0.0 - 0.5 %    ABS. NEUTROPHILS 7.8 1.8 - 8.0 K/UL    ABS. LYMPHOCYTES 0.7 (L) 0.8 - 3.5 K/UL    ABS. MONOCYTES 0.5 0.0 - 1.0 K/UL    ABS. EOSINOPHILS 0.0 0.0 - 0.4 K/UL    ABS. BASOPHILS 0.0 0.0 - 0.1 K/UL    ABS. IMM. GRANS. 0.0 0.00 - 0.04 K/UL    DF SMEAR SCANNED      RBC COMMENTS NORMOCYTIC, NORMOCHROMIC         Medications:  Herceptin IV  Perjeta IV  Aloxi IVP  Decadron IVP  Emend IVPB  Taxotere IV  Carboplatin IV  Neulasta SQ on body injector    Ms. Mulligan tolerated treatment well and was discharged from [place] [id number] in stable condition. Port de-accessed, flushed & heparinized per protocol. She is to return on 8/3/20 for her next appointment.     Florina Martinez RN  July 13, 2020

## 2020-07-13 NOTE — PROGRESS NOTES
Pt in for labs and follow up visit today per protocol with Dr. Sonali Leiva and RN. This is week 8 of treatment. Vital signs are stable. Patient to go to infusion center after appointment to receive [facility]. QOLs and assessments completed per protocol. Next appt is scheduled for 8/24/20.

## 2020-07-13 NOTE — PROGRESS NOTES
This note will not be viewable in [address] DTE eflow  Social Work Navigator Encounter     Patient Name:  Farhat Camara    Medical History: breast cancer    Advance Directives: none on file; conversation deferred    Narrative: Social work referral received by Jeanette Chen NP. Met with patient in [facility]. Patient requesting guidance on appealing insurance coverage of a wig. She tells me the closest in-network provider is 4 hours away. Referred patient to her insurance company to start the appeal process. Encouraged patient to contact if additional information is needed. She voiced understanding and appreciation. Assessment/Action:  1. Patient to contact he insurance to appeal wig coverage.      Plan/Referral:   Insurance/Entitlements referral    Thank you,  Ayleen Sanchez LCSW

## 2020-07-13 NOTE — PROGRESS NOTES
[facility] [place] [address]  [address] [facility], [address] [facility] [address] [place] Cons: [phone]  F: [phone]      Reason for Visit:   Yehuda Butt is a 38 y.o. female who is seen in consultation at the request of Dr. Eric Agosto for evaluation of therapy for breast cancer    Treatment History:   · 20 left breast ax core bx: Adenocarcinoma, ER + at 86% ; TN + at 69%; HER 2 POSITIVE (IHC 2+, FISH ratio 3.2; sig/cell 6.08), ki67 29%  · 20 L breast core bx: Atypical 1 mm focus, highly suspicious for [facility], lobular features, the tumor does not histologically match the patient's prior axillary cancer, but could represent a small sample of the same tumor  · 20 L breast excisional bx: Subareolar lumpectomy, IDC, 1.6 cm, invasive tumor present at anterior and lateral margins, gr 2, + LVI, 1/1 LN involved, 1.1 cm, ER + at 48%, TN + at 72%, HER 2 POSITIVE (IHC 2+, FISH ratio 2, sig/cell 4.14)  · 20 [facility] genetic testing:  negative  · TCHP 2020-     History of Present Illness:   She noticed a L axilla mass in 2020, leading to the pathology above. Interval history:  In today for follow up and treatment. Complains of gr 1 diarrhea, gr 1 nausea, gr 1 hot flashes, gr 1 neuropathy.     FH:  No breast, ovarian, prostate, or pancreas cancer    Past Medical History:   Diagnosis Date    Anxiety     Cancer [facility])     Essential hypertension     HX OTHER MEDICAL ,         Infertility     IVF with first pregnancy    Infertility, female     Joint pain     Psychiatric problem       Past Surgical History:   Procedure Laterality Date    HX OTHER SURGICAL      [place] Teeth Removal    HX OTHER SURGICAL      2017 Excision of mole on toe with skin graph      Social History     Tobacco Use    Smoking status: Never Smoker    Smokeless tobacco: Never Used   Substance Use Topics    Alcohol use: Yes     Frequency: 4 or more times a week      Family History   Problem Relation Age of Onset    Diabetes Mother     Heart Disease Mother     Hypertension Mother     Hypertension Father     Cancer Paternal Grandmother         Lung cancer    Stroke Paternal Grandmother     Cancer Paternal Grandfather         Melanoma     Current Outpatient Medications   Medication Sig    magic mouthwash solution Magic mouth wash   Maalox  Lidocaine 2% viscous   Diphenhydramine oral solution     Pharmacy to mix equal portions of ingredients to a total volume as indicated in the dispense amount. 10-15 ml swish/spit and may swallow for throat pain every 4 hours PRN.  doxycycline (VIBRAMYCIN) 100 mg capsule TAKE 1 CAPSULE BY MOUTH TWICE A DAY *INS LIMITS 30 DAY    prochlorperazine (COMPAZINE) 10 mg tablet Take 1 Tab by mouth every six (6) hours as needed for Nausea.  dexAMETHasone (DECADRON) 4 mg tablet 8 mg bid the day before and day after chemo    lidocaine-prilocaine (EMLA) topical cream Apply  to affected area as needed for Pain.  ondansetron (ZOFRAN ODT) 8 mg disintegrating tablet Take 1 Tab by mouth every eight (8) hours as needed for Nausea or Vomiting. For 2 days following chemo    metoprolol tartrate (LOPRESSOR) 50 mg tablet Take  by mouth two (2) times a day.  ibuprofen (MOTRIN) 600 mg tablet Take 1 Tab by mouth every six (6) hours as needed for Pain.  sertraline (ZOLOFT) 25 mg tablet Take 25 mg by mouth daily.  PNV No.22-Iron Cbn&Gluc-FA-DOS 27-1-50 mg Tab Take  by mouth. Indications: PREGNANCY     No current facility-administered medications for this visit.       Facility-Administered Medications Ordered in Other Visits   Medication Dose Route Frequency    saline peripheral flush soln 10 mL  10 mL InterCATHeter PRN    0.9% sodium chloride injection 10 mL  10 mL IntraVENous PRN    heparin (porcine) pf 300-500 Units  300-500 Units InterCATHeter PRN      No Known Allergies     Review of Systems: A complete review of systems was obtained, negative except as described above. Physical Exam:     Visit Vitals  /65 (BP 1 Location: Left arm, BP Patient Position: Sitting)   Pulse 60   Temp 96.9 °F (36.1 °C) (Temporal)   Resp 16   Ht 5' 4\" (1.626 m)   Wt 122 lb (55.3 kg)   SpO2 98%   BMI 20.94 kg/m²     ECOG PS: 0    Constitutional: Appears well-developed and well-nourished in no apparent distress      Mental status: Alert and awake, Oriented to person/place/time, Able to follow commands    Eyes: EOM normal, Sclera normal, No visible ocular discharge    HENT: Normocephalic, atraumatic    Mouth/Throat: Moist mucous membranes    External Ears normal    Neck: No visualized mass    Pulmonary/Chest: Respiratory effort normal, No visualized signs of difficulty breathing or respiratory distress    Musculoskeletal: Normal gait with no signs of ataxia, Normal range of motion of neck    Neurological: No facial asymmetry (Cranial nerve 7 motor function), No gaze palsy    Skin: No significant exanthematous lesions or discoloration noted on facial skin    Psychiatric: Normal affect, No hallucinations     Breasts: L breast 1 cm subareolar mass; 1 cm L ax LN        Results:     Lab Results   Component Value Date/Time    WBC 9.0 07/13/2020 08:02 AM    HGB 11.3 (L) 07/13/2020 08:02 AM    HCT 32.7 (L) 07/13/2020 08:02 AM    PLATELET 566 (L) 08/14/5901 08:02 AM    MCV 91.6 07/13/2020 08:02 AM    ABS. NEUTROPHILS 7.8 07/13/2020 08:02 AM     Lab Results   Component Value Date/Time    Sodium 138 07/13/2020 08:02 AM    Potassium 3.8 07/13/2020 08:02 AM    Chloride 105 07/13/2020 08:02 AM    CO2 27 07/13/2020 08:02 AM    Glucose 133 (H) 07/13/2020 08:02 AM    BUN 10 07/13/2020 08:02 AM    Creatinine 0.70 07/13/2020 08:02 AM    GFR est AA >60 07/13/2020 08:02 AM    GFR est non-AA >60 07/13/2020 08:02 AM    Calcium 9.1 07/13/2020 08:02 AM     Lab Results   Component Value Date/Time    Bilirubin, total 0.7 07/13/2020 08:02 AM    ALT (SGPT) 57 07/13/2020 08:02 AM    Alk.  phosphatase 76 07/13/2020 08:02 AM    Protein, total 7.6 07/13/2020 08:02 AM    Albumin 4.3 07/13/2020 08:02 AM    Globulin 3.3 07/13/2020 08:02 AM     5/20/20 breast  MRI  Subareolar region of L breast 1.2 cm mass, at least 2 LN on left, 3 cm largest  Right breast negative    5/20/20 bone scan  Heterogeneous activity in the ribs of uncertain significance. This would be an unusual presentation of bony metastatic disease    5/20/20 CT c/a/p  negative    Records reviewed and summarized above. Pathology report(s) reviewed above. Radiology report(s) reviewed above. Assessment/plan:   1.  L breast cancer, LN + by core, ER +, MI +, HER 2 POSITIVE, cT1c cN1a cM0:  Stage IIA, prognostic stage IB    We explained to the patient that the goal of systemic adjuvant therapy is to improve the chances for cure and decrease the risk of relapse. We explained why a patient can have microscopic cancer spread now even though physical examination, laboratory studies and imaging studies are negative for cancer. We explained that the same treatments used now as adjuvant or preventive treatments rarely if ever are curative in women who develop metastases. We discussed that there is no difference in overall survival between neoadjuvant and adjuvant chemotherapy. We discussed the rationale for neoadjuvant chemotherapy, if chemotherapy is warranted, as it is in this case: to avoid any potential delays in giving chemotherapy following surgery, to be able to see the response of the tumor to chemotherapy, and to potentially downstage the tumor prior to surgery. Loyda Link suggests equivalency between q.3 week Adriamycin, Cytoxan followed by weekly paclitaxel and trastuzumab compared with the [facility] regimen. However, this study was not powered to show a difference between these two regimens, and in the [facility] publication, the AC-TH arm showed a numerically advantange (though not statistically significant) to the [facility] arm.   In this patient, it is completely reasonable to use [facility] approach and avoid the potential cardiotoxicity of the anthracyclines as well as the potential for leukemia. We discussed the toxicities of docetaxel and carboplatin chemotherapy in detail. This chemotherapy frequently causes a low white blood cell count and hospital admissions for treatment of neutropenic fever. We explained that we consider the use of growth factors to minimize this risk. We explained to the patient that some side effects if they occur only last a few days including nausea, vomiting, stomatitis, arthralgia, myalgia,and allergic reactions to Taxotere. We told the patient that severe nausea and vomiting were uncommon and that some side effects,if they occur, will last longer; this includes hair loss, which will be seen in all patients treated with these agents and fatigue,which will be seen in most.  We also informed that for the patient that heart damage is rare with these agents. We explained that carboplatin can rarely cause kidney damage and high frequency hearing loss. We provided the patient in detail her information concerning the toxicities of this regimen in addition to her overall discussion. Rationale for therapy with trastuzumab was also discussed with the patient including a 50% proportional improvement in disease free survival and also an improvement in overall survival in patients receiving trastuzumab and chemotherapy for HER-2 positive breast cancer. The side effects of trastuzumab were discussed including a 4%-5% risk of dropping her ejection fraction while on treatment and about a 1% risk of CHF. We discussed that this drug will be used every 3 weeks for remainder of a year following the chemotherapy cycles. We will check her EF before chemotherapy and every 3 months while she is receiving trastuzumab.     Rational for therapy with pertuzumab was also discussed with the patient including a nearly 20% improvement seen in pCR in the neoadjuvant setting with the addition of this medication. Additional AE discussed including an acne rash (and the use of doxycyline 100 mg bid to help prevent) and diarrhea and consideration of additional cardiomyopathy. · TCH-P (Trastuzumab 8mg/kg load with cycle 1 then 6mg/kg, Docetaxel 75mg/m2, Carboplatin AUC 6, Pertuzumab 840mg load with cycle 1 then 420mg) given every 3 weeks x 6 cycles  · Labs: CBC, CMP prior to each treatment. · Antiemetic Prophylaxis: Palonosetron and dexamethasone prior to chemo  · PRN Antiemetics: Ondansetron, Compazine  · Swelling prophylaxis: Dexmethasone 8mg bid the day before and day after chemotherapy  · TTE prior to chemotherapy and every 12 weeks while on Trastuzumab  · Neulasta 24-72 hours after each treatment    Cold caps discussed. She is interested and got fitted and will use    Oral and peripheral cryotherapy discussed. Peripheral neuropathy trial discussed and research met with her (essential trial for her) and she has consented. Screened by me for compass HER 2 de-escalation study, but her primary tumor is not > 1.5 cm    Discussed outback HP if pCR, if no pCR, discussed T-DM1     After this discussion, she is agreeable to TCH-P as above, she has signed informed consent. TTE on 5/28/2020 EF 64%. Port placed by Dr. Makayla Haas on 5/27/20     Breast mass biopsied and clipped on 5/21/20, also had surgical biopsy on 5/27/2020, will obtain results. She has follow up with Dr. Marina Martin on 6/3/2020. The patient was given presciptions for a wig, emla cream, decadron to take 8 mg bid the day before and day after chemo, zofran and compazine. Neulasta the following day. Will proceed with TCHP #3 today. She has follow up with Dr. Makayla Haas on 7/20/2020. IUD was removed by gyn. Will ask for receptors to be done on 5/27/20 lumpectomy specimen    2. Emotional well being:  She has excellent support and is coping well with her disease    3.  Genetic testing:  discussed potential ramifications of a positive test including the potential need for bilateral mastectomies and bilateral oophorectomies and the risk then for her family members to also have a mutation. VUS also discussed. Tested in Dr. Jasvir Justin office on 5/21/20, negative, will obtain results. 4. Loss of fertility:  Discussed potential loss of menses and fertility. She is not interested in having further children. Declines oncofertility consult    5. Bone pain: Due to neulasta. Recommend claritin daily. Mild improvement tylenol PRN. Recommend Ibuprofen BID for 3 days along with tylenol PRN. 6. Diarrhea: Due to chemo. Managed by Imodium PRN. 7. Throat pain:  Magic mouthwash PRN, rx in.     8. 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 I appreciate the opportunity to participate in Ms. Antonia lennon. Signed By: Joanie Burgess MD      No orders of the defined types were placed in this encounter.

## 2020-07-16 ENCOUNTER — TELEPHONE (OUTPATIENT)
Dept: ONCOLOGY | Age: 40
End: 2020-07-16

## 2020-07-16 NOTE — TELEPHONE ENCOUNTER
I called and spoke with the Patient and let her know our Recommendation's and she verbalized understanding and had no further question's at that time. ----- Message from Brenda Mcclellan NP sent at 7/16/2020  2:05 PM EDT -----  Regarding: FW: Non-Urgent Medical Question  Contact: [phone]  This may be due to hemorrhoids or even a small tear. I would recommend that she take imodium for her diarrhea and stay hydrated. She could take some tylenol or ibuprofen for the pain and even try otc hemorrhoid creams if needed. I would recommend that she monitor and let us know if this worsens, notices blood in stool, fever, abdominal pain, etc.  If this continues, let her know that we will refer to GI. Thanks!  ----- Message -----  From: Louise Bradley LPN  Sent: 8/81/5462   1:12 PM EDT  To: Brenda Mcclellan NP  Subject: FW: Non-Urgent Medical Question                  I called and spoke with the Patient and asked her some question's/details. She stated that the blood is NOT in the Toilet and it is NOT in the Stool, it is more like when she wipes only afterwards. She stated that Diarrhea is one of the side effects she has been having ever since her first Chemo and it has been happening pretty much everytime she has Diarrhea, also stated that obviously if she keeps going to the bathroom, it starts to become painful when going, so she thought maybe that had a roll to play in it too. Patient denied any Fevers or Abdominal pain and also NO blood in her Urine.   ----- Message -----  From: Alise Forbes RN  Sent: 7/16/2020  12:01 PM EDT  To: Kandyce Goltz, RN, April Edwards LPN  Subject: FW: Non-Urgent Medical Question                    ----- Message -----  From: Aris Gray  Sent: 7/16/2020  11:50 AM EDT  To: [facility] [address] [address] [place]  Subject: Non-Urgent Medical Question                      Dr Olga Lentz,   I had a chemo treatment this Monday 7/13. I'm having some bleeding with diarrhea.  Is this something to be concerned about or just another effect of the chemo on the intestinal tract?   Ilya Oliveira

## 2020-08-03 ENCOUNTER — OFFICE VISIT (OUTPATIENT)
Dept: ONCOLOGY | Age: 40
End: 2020-08-03
Payer: COMMERCIAL

## 2020-08-03 ENCOUNTER — HOSPITAL ENCOUNTER (OUTPATIENT)
Dept: INFUSION THERAPY | Age: 40
Discharge: HOME | End: 2020-08-03
Payer: COMMERCIAL

## 2020-08-03 VITALS
OXYGEN SATURATION: 97 % | SYSTOLIC BLOOD PRESSURE: 126 MMHG | BODY MASS INDEX: 20.83 KG/M2 | HEIGHT: 64 IN | TEMPERATURE: 98.3 F | WEIGHT: 122 LBS | RESPIRATION RATE: 18 BRPM | HEART RATE: 54 BPM | DIASTOLIC BLOOD PRESSURE: 76 MMHG

## 2020-08-03 VITALS
SYSTOLIC BLOOD PRESSURE: 118 MMHG | DIASTOLIC BLOOD PRESSURE: 80 MMHG | HEART RATE: 52 BPM | RESPIRATION RATE: 18 BRPM | WEIGHT: 122.7 LBS | TEMPERATURE: 98 F | BODY MASS INDEX: 20.95 KG/M2 | OXYGEN SATURATION: 97 % | HEIGHT: 64 IN

## 2020-08-03 DIAGNOSIS — Z17.0 MALIGNANT NEOPLASM OF CENTRAL PORTION OF LEFT BREAST IN FEMALE, ESTROGEN RECEPTOR POSITIVE (HCC): Primary | ICD-10-CM

## 2020-08-03 DIAGNOSIS — C50.912 STAGE II BREAST CANCER, LEFT (HCC): Primary | ICD-10-CM

## 2020-08-03 DIAGNOSIS — Z79.899 ENCOUNTER FOR MONITORING CARDIOTOXIC DRUG THERAPY: ICD-10-CM

## 2020-08-03 DIAGNOSIS — C50.112 MALIGNANT NEOPLASM OF CENTRAL PORTION OF LEFT BREAST IN FEMALE, ESTROGEN RECEPTOR POSITIVE (HCC): Primary | ICD-10-CM

## 2020-08-03 DIAGNOSIS — Z51.81 ENCOUNTER FOR MONITORING CARDIOTOXIC DRUG THERAPY: ICD-10-CM

## 2020-08-03 DIAGNOSIS — Z51.11 CHEMOTHERAPY MANAGEMENT, ENCOUNTER FOR: ICD-10-CM

## 2020-08-03 LAB
ALBUMIN SERPL-MCNC: 4.4 G/DL (ref 3.5–5)
ALBUMIN/GLOB SERPL: 1.5 {RATIO} (ref 1.1–2.2)
ALP SERPL-CCNC: 73 U/L (ref 45–117)
ALT SERPL-CCNC: 48 U/L (ref 12–78)
ANION GAP SERPL CALC-SCNC: 8 MMOL/L (ref 5–15)
AST SERPL-CCNC: 33 U/L (ref 15–37)
BASO+EOS+MONOS # BLD AUTO: 0.3 K/UL (ref 0.2–1.2)
BASO+EOS+MONOS NFR BLD AUTO: 4 % (ref 3.2–16.9)
BILIRUB SERPL-MCNC: 0.8 MG/DL (ref 0.2–1)
BUN SERPL-MCNC: 11 MG/DL (ref 6–20)
BUN/CREAT SERPL: 16 (ref 12–20)
CALCIUM SERPL-MCNC: 9 MG/DL (ref 8.5–10.1)
CHLORIDE SERPL-SCNC: 104 MMOL/L (ref 97–108)
CO2 SERPL-SCNC: 27 MMOL/L (ref 21–32)
CREAT SERPL-MCNC: 0.68 MG/DL (ref 0.55–1.02)
DIFFERENTIAL METHOD BLD: ABNORMAL
ERYTHROCYTE [DISTWIDTH] IN BLOOD BY AUTOMATED COUNT: 18 % (ref 11.8–15.8)
GLOBULIN SER CALC-MCNC: 3 G/DL (ref 2–4)
GLUCOSE SERPL-MCNC: 151 MG/DL (ref 65–100)
HCT VFR BLD AUTO: 31.4 % (ref 35–47)
HGB BLD-MCNC: 11.3 G/DL (ref 11.5–16)
LYMPHOCYTES # BLD: 0.6 K/UL (ref 0.8–3.5)
LYMPHOCYTES NFR BLD: 7 % (ref 12–49)
MCH RBC QN AUTO: 33.3 PG (ref 26–34)
MCHC RBC AUTO-ENTMCNC: 36 G/DL (ref 30–36.5)
MCV RBC AUTO: 92.6 FL (ref 80–99)
NEUTS SEG # BLD: 8.4 K/UL (ref 1.8–8)
NEUTS SEG NFR BLD: 90 % (ref 32–75)
PLATELET # BLD AUTO: 119 K/UL (ref 150–400)
POTASSIUM SERPL-SCNC: 3.7 MMOL/L (ref 3.5–5.1)
PROT SERPL-MCNC: 7.4 G/DL (ref 6.4–8.2)
RBC # BLD AUTO: 3.39 M/UL (ref 3.8–5.2)
SODIUM SERPL-SCNC: 139 MMOL/L (ref 136–145)
WBC # BLD AUTO: 9.3 K/UL (ref 3.6–11)

## 2020-08-03 PROCEDURE — 77030012965 HC NDL HUBR BBMI -A

## 2020-08-03 PROCEDURE — 74011000250 HC RX REV CODE- 250: Performed by: INTERNAL MEDICINE

## 2020-08-03 PROCEDURE — 99214 OFFICE O/P EST MOD 30 MIN: CPT | Performed by: INTERNAL MEDICINE

## 2020-08-03 PROCEDURE — 74011250636 HC RX REV CODE- 250/636: Performed by: INTERNAL MEDICINE

## 2020-08-03 PROCEDURE — 80053 COMPREHEN METABOLIC PANEL: CPT

## 2020-08-03 PROCEDURE — 74011000258 HC RX REV CODE- 258: Performed by: INTERNAL MEDICINE

## 2020-08-03 PROCEDURE — 96375 TX/PRO/DX INJ NEW DRUG ADDON: CPT

## 2020-08-03 PROCEDURE — 96367 TX/PROPH/DG ADDL SEQ IV INF: CPT

## 2020-08-03 PROCEDURE — 85025 COMPLETE CBC W/AUTO DIFF WBC: CPT

## 2020-08-03 PROCEDURE — 96417 CHEMO IV INFUS EACH ADDL SEQ: CPT

## 2020-08-03 PROCEDURE — 96413 CHEMO IV INFUSION 1 HR: CPT

## 2020-08-03 PROCEDURE — 96377 APPLICATON ON-BODY INJECTOR: CPT

## 2020-08-03 PROCEDURE — 36415 COLL VENOUS BLD VENIPUNCTURE: CPT

## 2020-08-03 RX ORDER — SODIUM CHLORIDE 9 MG/ML
10 INJECTION INTRAMUSCULAR; INTRAVENOUS; SUBCUTANEOUS AS NEEDED
Status: ACTIVE | OUTPATIENT
Start: 2020-08-03 | End: 2020-08-03

## 2020-08-03 RX ORDER — PALONOSETRON 0.05 MG/ML
0.25 INJECTION, SOLUTION INTRAVENOUS ONCE
Status: COMPLETED | OUTPATIENT
Start: 2020-08-03 | End: 2020-08-03

## 2020-08-03 RX ORDER — DEXAMETHASONE SODIUM PHOSPHATE 100 MG/10ML
10 INJECTION INTRAMUSCULAR; INTRAVENOUS ONCE
Status: COMPLETED | OUTPATIENT
Start: 2020-08-03 | End: 2020-08-03

## 2020-08-03 RX ORDER — HEPARIN 100 UNIT/ML
300-500 SYRINGE INTRAVENOUS AS NEEDED
Status: ACTIVE | OUTPATIENT
Start: 2020-08-03 | End: 2020-08-03

## 2020-08-03 RX ORDER — SODIUM CHLORIDE 0.9 % (FLUSH) 0.9 %
10 SYRINGE (ML) INJECTION AS NEEDED
Status: DISPENSED | OUTPATIENT
Start: 2020-08-03 | End: 2020-08-03

## 2020-08-03 RX ORDER — SODIUM CHLORIDE 9 MG/ML
25 INJECTION, SOLUTION INTRAVENOUS CONTINUOUS
Status: DISPENSED | OUTPATIENT
Start: 2020-08-03 | End: 2020-08-03

## 2020-08-03 RX ADMIN — Medication 10 ML: at 10:01

## 2020-08-03 RX ADMIN — Medication 10 ML: at 10:03

## 2020-08-03 RX ADMIN — FOSAPREPITANT 150 MG: 150 INJECTION, POWDER, LYOPHILIZED, FOR SOLUTION INTRAVENOUS at 12:14

## 2020-08-03 RX ADMIN — TRASTUZUMAB 344 MG: 150 INJECTION, POWDER, LYOPHILIZED, FOR SOLUTION INTRAVENOUS at 11:31

## 2020-08-03 RX ADMIN — Medication 500 UNITS: at 16:10

## 2020-08-03 RX ADMIN — PERTUZUMAB 420 MG: 30 INJECTION, SOLUTION, CONCENTRATE INTRAVENOUS at 12:50

## 2020-08-03 RX ADMIN — CARBOPLATIN 720 MG: 10 INJECTION INTRAVENOUS at 15:25

## 2020-08-03 RX ADMIN — PEGFILGRASTIM 6 MG: KIT SUBCUTANEOUS at 16:04

## 2020-08-03 RX ADMIN — Medication 10 ML: at 12:29

## 2020-08-03 RX ADMIN — DEXAMETHASONE SODIUM PHOSPHATE 10 MG: 10 INJECTION INTRAMUSCULAR; INTRAVENOUS at 12:28

## 2020-08-03 RX ADMIN — PALONOSETRON HYDROCHLORIDE 0.25 MG: 0.25 INJECTION INTRAVENOUS at 12:26

## 2020-08-03 RX ADMIN — Medication 10 ML: at 16:10

## 2020-08-03 RX ADMIN — SODIUM CHLORIDE 25 ML/HR: 900 INJECTION, SOLUTION INTRAVENOUS at 10:00

## 2020-08-03 RX ADMIN — SODIUM CHLORIDE 121 MG: 900 INJECTION, SOLUTION INTRAVENOUS at 14:05

## 2020-08-03 RX ADMIN — SODIUM CHLORIDE 10 ML: 9 INJECTION, SOLUTION INTRAMUSCULAR; INTRAVENOUS; SUBCUTANEOUS at 10:00

## 2020-08-03 NOTE — PROGRESS NOTES
[facility] [place] [facility] [place] [address] [facility] [address] 100[address] [facility]  W: [phone]  F: [phone]      Reason for Visit:   Angela Waddell is a 38 y.o. female who is seen in consultation at the request of Dr. Renay Vides for evaluation of therapy for breast cancer    Plastic surg:  Dr. Van Wells onc:  Dr. Montana Harrington    Treatment History:   · 20 left breast ax core bx: Adenocarcinoma, ER + at 86% ; [place] + at 69%; HER 2 POSITIVE (IHC 2+, FISH ratio 3.2; sig/cell 6.08), ki67 29%  · 20 L breast core bx: Atypical 1 mm focus, highly suspicious for [facility], lobular features, the tumor does not histologically match the patient's prior axillary cancer, but could represent a small sample of the same tumor  · 20 L breast excisional bx: Subareolar lumpectomy, IDC, 1.6 cm, invasive tumor present at anterior and lateral margins, gr 2, + LVI, 1/1 LN involved, 1.1 cm, ER + at 48%, [place] + at 72%, HER 2 POSITIVE (IHC 2+, FISH ratio 2, sig/cell 4.14)  · 20 [facility] Badder genetic testing:  negative  · TCHP 2020-     History of Present Illness:   She noticed a L axilla mass in 2020, leading to the pathology above. Interval history:  In today for follow up and treatment. Complains of gr 1 bleeding, gr 1 diarrhea, gr 1 nausea, gr 1 hot flashes, gr 1 tachycardia, gr 1 itching, gr 1 neuropathy.     FH:  No breast, ovarian, prostate, or pancreas cancer    Past Medical History:   Diagnosis Date    Anxiety     Cancer [facility])     Essential hypertension     HX OTHER MEDICAL ,         Infertility     IVF with first pregnancy    Infertility, female     Joint pain     Psychiatric problem       Past Surgical History:   Procedure Laterality Date    HX OTHER SURGICAL      Hendricks Teeth Removal    HX OTHER SURGICAL      2017 Excision of mole on toe with skin graph      Social History     Tobacco Use    Smoking status: Never Smoker    Smokeless tobacco: Never Used   Substance Use Topics    Alcohol use: Yes     Frequency: 4 or more times a week      Family History   Problem Relation Age of Onset    Diabetes Mother     Heart Disease Mother     Hypertension Mother     Hypertension Father     Cancer Paternal Grandmother         Lung cancer    Stroke Paternal Grandmother     Cancer Paternal Grandfather         Melanoma     Current Outpatient Medications   Medication Sig    magic mouthwash solution Magic mouth wash   Maalox  Lidocaine 2% viscous   Diphenhydramine oral solution     Pharmacy to mix equal portions of ingredients to a total volume as indicated in the dispense amount. 10-15 ml swish/spit and may swallow for throat pain every 4 hours PRN.  doxycycline (VIBRAMYCIN) 100 mg capsule TAKE 1 CAPSULE BY MOUTH TWICE A DAY *INS LIMITS 30 DAY    prochlorperazine (COMPAZINE) 10 mg tablet Take 1 Tab by mouth every six (6) hours as needed for Nausea.  dexAMETHasone (DECADRON) 4 mg tablet 8 mg bid the day before and day after chemo    lidocaine-prilocaine (EMLA) topical cream Apply  to affected area as needed for Pain.  ondansetron (ZOFRAN ODT) 8 mg disintegrating tablet Take 1 Tab by mouth every eight (8) hours as needed for Nausea or Vomiting. For 2 days following chemo    metoprolol tartrate (LOPRESSOR) 50 mg tablet Take  by mouth two (2) times a day.  ibuprofen (MOTRIN) 600 mg tablet Take 1 Tab by mouth every six (6) hours as needed for Pain.  sertraline (ZOLOFT) 25 mg tablet Take 25 mg by mouth daily.  PNV No.22-Iron Cbn&Gluc-FA-DOS 27-1-50 mg Tab Take  by mouth. Indications: PREGNANCY     No current facility-administered medications for this visit. No Known Allergies     Review of Systems: A complete review of systems was obtained, negative except as described above.     Physical Exam:     Visit Vitals  /76 (BP 1 Location: Left arm, BP Patient Position: Sitting)   Pulse (!) 54   Temp 98.3 °F (36.8 °C) (Temporal)   Resp 18   Ht 5' 4\" (1.626 m) Wt 122 lb (55.3 kg)   SpO2 97%   BMI 20.94 kg/m²     ECOG PS: 0    Constitutional: Appears well-developed and well-nourished in no apparent distress      Mental status: Alert and awake, Oriented to person/place/time, Able to follow commands    Eyes: EOM normal, Sclera normal, No visible ocular discharge    HENT: Normocephalic, atraumatic    Mouth/Throat: Moist mucous membranes    External Ears normal    Neck: No visualized mass    Pulmonary/Chest: Respiratory effort normal, No visualized signs of difficulty breathing or respiratory distress    Musculoskeletal: Normal gait with no signs of ataxia, Normal range of motion of neck    Neurological: No facial asymmetry (Cranial nerve 7 motor function), No gaze palsy    Skin: No significant exanthematous lesions or discoloration noted on facial skin    Psychiatric: Normal affect, No hallucinations     Breasts: L breast 1 cm subareolar mass; 1 cm L ax LN (last exam, deferred today)        Results:     Lab Results   Component Value Date/Time    WBC 9.3 08/03/2020 08:17 AM    HGB 11.3 (L) 08/03/2020 08:17 AM    HCT 31.4 (L) 08/03/2020 08:17 AM    PLATELET 942 (L) 49/54/1732 08:17 AM    MCV 92.6 08/03/2020 08:17 AM    ABS. NEUTROPHILS 8.4 (H) 08/03/2020 08:17 AM     Lab Results   Component Value Date/Time    Sodium 139 08/03/2020 08:17 AM    Potassium 3.7 08/03/2020 08:17 AM    Chloride 104 08/03/2020 08:17 AM    CO2 27 08/03/2020 08:17 AM    Glucose 151 (H) 08/03/2020 08:17 AM    BUN 11 08/03/2020 08:17 AM    Creatinine 0.68 08/03/2020 08:17 AM    GFR est AA >60 08/03/2020 08:17 AM    GFR est non-AA >60 08/03/2020 08:17 AM    Calcium 9.0 08/03/2020 08:17 AM     Lab Results   Component Value Date/Time    Bilirubin, total 0.8 08/03/2020 08:17 AM    ALT (SGPT) 48 08/03/2020 08:17 AM    Alk.  phosphatase 73 08/03/2020 08:17 AM    Protein, total 7.4 08/03/2020 08:17 AM    Albumin 4.4 08/03/2020 08:17 AM    Globulin 3.0 08/03/2020 08:17 AM     5/20/20 breast  MRI  Subareolar region of L breast 1.2 cm mass, at least 2 LN on left, 3 cm largest  Right breast negative    5/20/20 bone scan  Heterogeneous activity in the ribs of uncertain significance. This would be an unusual presentation of bony metastatic disease    5/20/20 CT c/a/p  negative    Records reviewed and summarized above. Pathology report(s) reviewed above. Radiology report(s) reviewed above. Assessment/plan:   1.  L breast cancer, LN + by core, ER +, AL +, HER 2 POSITIVE, cT1c cN1a cM0:  Stage IIA, prognostic stage IB    We explained to the patient that the goal of systemic adjuvant therapy is to improve the chances for cure and decrease the risk of relapse. We explained why a patient can have microscopic cancer spread now even though physical examination, laboratory studies and imaging studies are negative for cancer. We explained that the same treatments used now as adjuvant or preventive treatments rarely if ever are curative in women who develop metastases. We discussed that there is no difference in overall survival between neoadjuvant and adjuvant chemotherapy. We discussed the rationale for neoadjuvant chemotherapy, if chemotherapy is warranted, as it is in this case: to avoid any potential delays in giving chemotherapy following surgery, to be able to see the response of the tumor to chemotherapy, and to potentially downstage the tumor prior to surgery. [facility] suggests equivalency between q.3 week Adriamycin, Cytoxan followed by weekly paclitaxel and trastuzumab compared with the [facility] regimen. However, this study was not powered to show a difference between these two regimens, and in the [facility] publication, the AC-TH arm showed a numerically advantange (though not statistically significant) to the [facility] arm. In this patient, it is completely reasonable to use [facility] approach and avoid the potential cardiotoxicity of the anthracyclines as well as the potential for leukemia.     We discussed the toxicities of docetaxel and carboplatin chemotherapy in detail. This chemotherapy frequently causes a low white blood cell count and hospital admissions for treatment of neutropenic fever. We explained that we consider the use of growth factors to minimize this risk. We explained to the patient that some side effects if they occur only last a few days including nausea, vomiting, stomatitis, arthralgia, myalgia,and allergic reactions to Taxotere. We told the patient that severe nausea and vomiting were uncommon and that some side effects,if they occur, will last longer; this includes hair loss, which will be seen in all patients treated with these agents and fatigue,which will be seen in most.  We also informed that for the patient that heart damage is rare with these agents. We explained that carboplatin can rarely cause kidney damage and high frequency hearing loss. We provided the patient in detail her information concerning the toxicities of this regimen in addition to her overall discussion. Rationale for therapy with trastuzumab was also discussed with the patient including a 50% proportional improvement in disease free survival and also an improvement in overall survival in patients receiving trastuzumab and chemotherapy for HER-2 positive breast cancer. The side effects of trastuzumab were discussed including a 4%-5% risk of dropping her ejection fraction while on treatment and about a 1% risk of CHF. We discussed that this drug will be used every 3 weeks for remainder of a year following the chemotherapy cycles. We will check her EF before chemotherapy and every 3 months while she is receiving trastuzumab. Rational for therapy with pertuzumab was also discussed with the patient including a nearly 20% improvement seen in pCR in the neoadjuvant setting with the addition of this medication.   Additional AE discussed including an acne rash (and the use of doxycyline 100 mg bid to help prevent) and diarrhea and consideration of additional cardiomyopathy. · TCH-P (Trastuzumab 8mg/kg load with cycle 1 then 6mg/kg, Docetaxel 75mg/m2, Carboplatin AUC 6, Pertuzumab 840mg load with cycle 1 then 420mg) given every 3 weeks x 6 cycles  · Labs: CBC, CMP prior to each treatment. · Antiemetic Prophylaxis: Palonosetron and dexamethasone prior to chemo  · PRN Antiemetics: Ondansetron, Compazine  · Swelling prophylaxis: Dexmethasone 8mg bid the day before and day after chemotherapy  · TTE prior to chemotherapy and every 12 weeks while on Trastuzumab  · Neulasta 24-72 hours after each treatment    Cold caps discussed. She is interested and got fitted and will use    Oral and peripheral cryotherapy discussed. Peripheral neuropathy trial discussed and research met with her (essential trial for her) and she has consented. Screened by me for compass HER 2 de-escalation study, but her primary tumor is not > 1.5 cm    Discussed outback HP if pCR, if no pCR, discussed T-DM1     After this discussion, she is agreeable to TCH-P as above, she has signed informed consent. TTE on 5/28/2020 EF 64%, next TTE due prior to next treatment, ordered    Port placed by Dr. Tad Duron on 5/27/20     Breast mass biopsied and clipped on 5/21/20, also had surgical biopsy on 5/27/2020. She had follow up with Dr. Sarkis Barragan on 6/3/2020. The patient was given presciptions for a wig, emla cream, decadron to take 8 mg bid the day before and day after chemo, zofran and compazine. Neulasta the following day. Will proceed with TCHP #4 today. She had follow up with Dr. Tad Duron on 7/20/2020. IUD was removed by gyn. Will ask for receptors to be done on 5/27/20 lumpectomy specimen    2. Emotional well being:  She has excellent support and is coping well with her disease    3.  Genetic testing:  discussed potential ramifications of a positive test including the potential need for bilateral mastectomies and bilateral oophorectomies and the risk then for her family members to also have a mutation. VUS also discussed. Tested in Dr. Julianne Purdy office on 5/21/20, negative    4. Loss of fertility:  Discussed potential loss of menses and fertility. She is not interested in having further children. Declines oncofertility consult    5. Bone pain: Due to neulasta. Recommend claritin daily. Mild improvement tylenol PRN. Recommend Ibuprofen BID for 3 days along with tylenol PRN. 6. Diarrhea: Due to chemo. Managed by Imodium PRN. 7. Throat pain:  Magic mouthwash PRN, rx in.     8. 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 9. Blood on toilet paper:  Denies blood in stool, or large amounts of blood. Most likely due to diarrhea/possible hemorrhoids. Will monitor. I appreciate the opportunity to participate in Ms. Jamin Faust saji. Signed By: Brennan Jeff MD      No orders of the defined types were placed in this encounter.

## 2020-08-03 NOTE — PROGRESS NOTES
[facility] - Chemotherapy Progress Note    4049 Pt admit to [facility] for C 4 D 1 tchp ambulatory in stable condition. Assessment completed. No new concerns voiced. pac with positive blood return. Chemotherapy Flowsheet 8/3/2020   Cycle C 4 D1    Date 8/3/2020   Drug / Regimen TCHP   Pre Meds -   Notes OBI       Visit Vitals  /80   Pulse (!) 52   Temp 98 °F (36.7 °C)   Resp 18   Ht 5' 4\" (1.626 m)   Wt 55.7 kg (122 lb 11.2 oz)   SpO2 97%   Breastfeeding No   BMI 21.06 kg/m²   Pt declines pregnancy  Labs obtained and patient proceeded to scheduled MD appointment. Pt returned- no change to treatment.  RN obtained order- ok to give pre meds prior to perjeta  Medications:          Medications:  Medications Administered     0.9% sodium chloride infusion     Admin Date  08/03/2020 Action  New Bag Dose  25 mL/hr Rate  25 mL/hr Route  IntraVENous Administered By  Jonathan Ornelas RN          0.9% sodium chloride injection 10 mL     Admin Date  08/03/2020 Action  Given Dose  10 mL Route  IntraVENous Administered By  Jonathan Ornelas RN          CARBOplatin (PARAPLATIN) 720 mg in 0.9% sodium chloride 250 mL, overfill volume 25 mL chemo infusion     Admin Date  08/03/2020 Action  New Bag Dose  720 mg Rate  694 mL/hr Route  IntraVENous Administered By  Jonathan Ornelas RN          dexamethasone (DECADRON) 10 mg/mL injection 10 mg     Admin Date  08/03/2020 Action  Given Dose  10 mg Route  IntraVENous Administered By  Jonathan Ornelas RN          DOCEtaxeL (TAXOTERE) 121 mg in 0.9% sodium chloride 250 mL, overfill volume 25 mL chemo infusion     Admin Date  08/03/2020 Action  New Bag Dose  121 mg Route  IntraVENous Administered By  Jonathan Ornelas RN          fosaprepitant (EMEND) 150 mg in 0.9% sodium chloride 150 mL IVPB     Admin Date  08/03/2020 Action  Given Dose  150 mg Rate  450 mL/hr Route  IntraVENous Administered By  Vaishali Nichole RN          heparin (porcine) pf 300-500 Units     Admin Date  08/03/2020 Action  Given Dose  500 Units Route  InterCATHeter Administered By  Yadira Batista RN          palonosetron HCl (ALOXI) injection 0.25 mg     Admin Date  08/03/2020 Action  Given Dose  0.25 mg Route  IntraVENous Administered By  Yadira Batista RN          pegfilgrastim (NEULASTA) wearable SQ injector 6 mg     Admin Date  08/03/2020 Action  Given Dose  6 mg Route  SubCUTAneous Administered By  Yadira Batista RN          pertuzumab (PERJETA) 420 mg in 0.9% sodium chloride 250 mL, overfill volume 25 mL IVPB     Admin Date  08/03/2020 Action  New Bag Dose  420 mg Rate  578 mL/hr Route  IntraVENous Administered By  Yadira Batista RN          saline peripheral flush soln 10 mL     Admin Date  08/03/2020 Action  Given Dose  10 mL Route  InterCATHeter Administered By  Yadira Batista RN           Admin Date  08/03/2020 Action  Given Dose  10 mL Route  InterCATHeter Administered By  Yadira Batista RN           Admin Date  08/03/2020 Action  Given Dose  10 mL Route  InterCATHeter Administered By  Yadira Batista RN           Admin Date  08/03/2020 Action  Given Dose  10 mL Route  InterCATHeter Administered By  Yadira Batista RN          trastuzumab (HERCEPTIN) 344 mg in 0.9% sodium chloride 250 mL, overfill volume 25 mL IVPB     Admin Date  08/03/2020 Action  New Bag Dose  344 mg Rate  582.8 mL/hr Route  IntraVENous Administered By  Yadira Batista RN              Education provided to patient about Neulasta On Body Injector including: side effects, how/when to remove the device, as well as what to do in the event of device malfunction. Opportunity for questions was provided and all questions were answered. Patient verbalized understanding. 1610 Patient tolerated treatment well. Patient discharged from [facility] ambulatory in no distress at 1610. Patient aware of next appointment.     Future Appointments   Date Time Provider Han Ann   8/24/2020  7:30 AM [facility]   9/14/2020 9:00 AM SS INF1 CH2 >7H [facility]   10/5/2020  7:30 AM SS INF4 CH2 >7H [facility]         Recent Results (from the past 12 hour(s))   CBC WITH 3 PART DIFF    Collection Time: 08/03/20  8:17 AM   Result Value Ref Range    WBC 9.3 3.6 - 11.0 K/uL    RBC 3.39 (L) 3.80 - 5.20 M/uL    HGB 11.3 (L) 11.5 - 16.0 g/dL    HCT 31.4 (L) 35.0 - 47.0 %    MCV 92.6 80.0 - 99.0 FL    MCH 33.3 26.0 - 34.0 PG    MCHC 36.0 30.0 - 36.5 g/dL    RDW 18.0 (H) 11.8 - 15.8 %    PLATELET 496 (L) 126 - 400 K/uL    NEUTROPHILS 90 (H) 32 - 75 %    MIXED CELLS 4 3.2 - 16.9 %    LYMPHOCYTES 7 (L) 12 - 49 %    ABS. NEUTROPHILS 8.4 (H) 1.8 - 8.0 K/UL    ABS. MIXED CELLS 0.3 0.2 - 1.2 K/uL    ABS. LYMPHOCYTES 0.6 (L) 0.8 - 3.5 K/UL    DF AUTOMATED     METABOLIC PANEL, COMPREHENSIVE    Collection Time: 08/03/20  8:17 AM   Result Value Ref Range    Sodium 139 136 - 145 mmol/L    Potassium 3.7 3.5 - 5.1 mmol/L    Chloride 104 97 - 108 mmol/L    CO2 27 21 - 32 mmol/L    Anion gap 8 5 - 15 mmol/L    Glucose 151 (H) 65 - 100 mg/dL    BUN 11 6 - 20 MG/DL    Creatinine 0.68 0.55 - 1.02 MG/DL    BUN/Creatinine ratio 16 12 - 20      GFR est AA >60 >60 ml/min/1.73m2    GFR est non-AA >60 >60 ml/min/1.73m2    Calcium 9.0 8.5 - 10.1 MG/DL    Bilirubin, total 0.8 0.2 - 1.0 MG/DL    ALT (SGPT) 48 12 - 78 U/L    AST (SGOT) 33 15 - 37 U/L    Alk.  phosphatase 73 45 - 117 U/L    Protein, total 7.4 6.4 - 8.2 g/dL    Albumin 4.4 3.5 - 5.0 g/dL    Globulin 3.0 2.0 - 4.0 g/dL    A-G Ratio 1.5 1.1 - 2.2

## 2020-08-03 NOTE — PROGRESS NOTES
Cinthya Allison is a 38 y.o. female Follow up for the Evaluation of Breast Cancer. 1. Have you been to the ER, urgent care clinic since your last visit? Hospitalized since your last visit? No    2. Have you seen or consulted any other health care providers outside of the [address] [place] [address] since your last visit? Include any pap smears or colon screening.  No

## 2020-08-10 ENCOUNTER — HOSPITAL ENCOUNTER (OUTPATIENT)
Dept: NON INVASIVE DIAGNOSTICS | Age: 40
Discharge: HOME | End: 2020-08-10
Attending: NURSE PRACTITIONER
Payer: COMMERCIAL

## 2020-08-10 VITALS
BODY MASS INDEX: 20.81 KG/M2 | HEIGHT: 64 IN | WEIGHT: 121.91 LBS | DIASTOLIC BLOOD PRESSURE: 83 MMHG | SYSTOLIC BLOOD PRESSURE: 124 MMHG

## 2020-08-10 DIAGNOSIS — Z51.11 CHEMOTHERAPY MANAGEMENT, ENCOUNTER FOR: ICD-10-CM

## 2020-08-10 DIAGNOSIS — Z17.0 MALIGNANT NEOPLASM OF CENTRAL PORTION OF LEFT BREAST IN FEMALE, ESTROGEN RECEPTOR POSITIVE (HCC): ICD-10-CM

## 2020-08-10 DIAGNOSIS — Z51.81 ENCOUNTER FOR MONITORING CARDIOTOXIC DRUG THERAPY: ICD-10-CM

## 2020-08-10 DIAGNOSIS — Z79.899 ENCOUNTER FOR MONITORING CARDIOTOXIC DRUG THERAPY: ICD-10-CM

## 2020-08-10 DIAGNOSIS — C50.112 MALIGNANT NEOPLASM OF CENTRAL PORTION OF LEFT BREAST IN FEMALE, ESTROGEN RECEPTOR POSITIVE (HCC): ICD-10-CM

## 2020-08-10 PROCEDURE — 93306 TTE W/DOPPLER COMPLETE: CPT

## 2020-08-11 LAB
ECHO AO ASC DIAM: 2.99 CM
ECHO AO ROOT DIAM: 3.13 CM
ECHO AV AREA PEAK VELOCITY: 2.55 CM2
ECHO AV AREA/BSA PEAK VELOCITY: 1.6 CM2/M2
ECHO AV PEAK GRADIENT: 6.68 MMHG
ECHO AV PEAK VELOCITY: 129.21 CM/S
ECHO IVC PROX: 1.57 CM
ECHO LA AREA 4C: 13.14 CM2
ECHO LA MAJOR AXIS: 2.39 CM
ECHO LA MINOR AXIS: 1.51 CM
ECHO LA VOL 2C: 39.85 ML (ref 22–52)
ECHO LA VOL 4C: 27.21 ML (ref 22–52)
ECHO LA VOL BP: 36.4 ML (ref 22–52)
ECHO LA VOL/BSA BIPLANE: 22.97 ML/M2 (ref 16–28)
ECHO LA VOLUME INDEX A2C: 25.14 ML/M2 (ref 16–28)
ECHO LA VOLUME INDEX A4C: 17.17 ML/M2 (ref 16–28)
ECHO LV E' LATERAL VELOCITY: 9.77 CENTIMETER/SECOND
ECHO LV E' SEPTAL VELOCITY: 11.75 CENTIMETER/SECOND
ECHO LV EDV A2C: 93.81 ML
ECHO LV EDV A4C: 79.67 ML
ECHO LV EDV BP: 88.76 ML (ref 56–104)
ECHO LV EDV INDEX A4C: 50.3 ML/M2
ECHO LV EDV INDEX BP: 56 ML/M2
ECHO LV EDV NDEX A2C: 59.2 ML/M2
ECHO LV EJECTION FRACTION A2C: 56 PERCENT
ECHO LV EJECTION FRACTION A4C: 44 PERCENT
ECHO LV EJECTION FRACTION BIPLANE: 48.4 PERCENT (ref 55–100)
ECHO LV ESV A2C: 41.41 ML
ECHO LV ESV A4C: 44.27 ML
ECHO LV ESV BP: 45.81 ML (ref 19–49)
ECHO LV ESV INDEX A2C: 26.1 ML/M2
ECHO LV ESV INDEX A4C: 27.9 ML/M2
ECHO LV ESV INDEX BP: 28.9 ML/M2
ECHO LV GLOBAL LONGITUDINAL STRAIN (GLS): 20 PERCENT
ECHO LV INTERNAL DIMENSION DIASTOLIC: 5.2 CM (ref 3.9–5.3)
ECHO LV INTERNAL DIMENSION SYSTOLIC: 3.42 CM
ECHO LV IVSD: 0.7 CM (ref 0.6–0.9)
ECHO LV MASS 2D: 116.3 G (ref 67–162)
ECHO LV MASS INDEX 2D: 73.4 G/M2 (ref 43–95)
ECHO LV POSTERIOR WALL DIASTOLIC: 0.64 CM (ref 0.6–0.9)
ECHO LVOT DIAM: 2.15 CM
ECHO LVOT PEAK GRADIENT: 3.31 MMHG
ECHO LVOT PEAK VELOCITY: 90.98 CM/S
ECHO LVOT SV: 70.2 ML
ECHO LVOT VTI: 19.4 CM
ECHO MV A VELOCITY: 68.84 CENTIMETER/SECOND
ECHO MV AREA PHT: 5 CM2
ECHO MV E DECELERATION TIME (DT): 0.15 S
ECHO MV E VELOCITY: 98.56 CENTIMETER/SECOND
ECHO MV PRESSURE HALF TIME (PHT): 0.04 S
ECHO PV MAX VELOCITY: 86.62 CM/S
ECHO PV PEAK INSTANTANEOUS GRADIENT SYSTOLIC: 3 MMHG
ECHO RV INTERNAL DIMENSION: 3.3 CM
ECHO RV TAPSE: 2.19 CM (ref 1.5–2)
GLOBAL LONGITUDINAL STRAIN 2 CHAMBER: 19.9 PERCENT
GLOBAL LONGITUDINAL STRAIN 4 CHAMBER: 22.1 PERCENT
GLOBAL LONGITUDINAL STRAIN LONG AXIS: 17.8 PERCENT
LA VOL DISK BP: 32.95 ML (ref 22–52)
LVOT MG: 1.48 MMHG

## 2020-08-13 DIAGNOSIS — Z17.0 MALIGNANT NEOPLASM OF CENTRAL PORTION OF LEFT BREAST IN FEMALE, ESTROGEN RECEPTOR POSITIVE (HCC): Primary | ICD-10-CM

## 2020-08-13 DIAGNOSIS — C50.112 MALIGNANT NEOPLASM OF CENTRAL PORTION OF LEFT BREAST IN FEMALE, ESTROGEN RECEPTOR POSITIVE (HCC): Primary | ICD-10-CM

## 2020-08-13 RX ORDER — CARVEDILOL 3.12 MG/1
3.12 TABLET ORAL 2 TIMES DAILY WITH MEALS
Qty: 60 TAB | Refills: 3 | Status: SHIPPED | OUTPATIENT
Start: 2020-08-13 | End: 2020-11-09

## 2020-08-14 RX ORDER — DIPHENHYDRAMINE HYDROCHLORIDE 50 MG/ML
50 INJECTION, SOLUTION INTRAMUSCULAR; INTRAVENOUS
Status: CANCELLED | OUTPATIENT
Start: 2020-08-24

## 2020-08-14 RX ORDER — HYDROCORTISONE SODIUM SUCCINATE 100 MG/2ML
100 INJECTION, POWDER, FOR SOLUTION INTRAMUSCULAR; INTRAVENOUS AS NEEDED
Status: CANCELLED | OUTPATIENT
Start: 2020-08-24

## 2020-08-14 RX ORDER — DIPHENHYDRAMINE HYDROCHLORIDE 50 MG/ML
50 INJECTION, SOLUTION INTRAMUSCULAR; INTRAVENOUS AS NEEDED
Status: CANCELLED
Start: 2020-08-24

## 2020-08-14 RX ORDER — EPINEPHRINE 1 MG/ML
0.3 INJECTION, SOLUTION, CONCENTRATE INTRAVENOUS AS NEEDED
Status: CANCELLED | OUTPATIENT
Start: 2020-08-24

## 2020-08-14 RX ORDER — ACETAMINOPHEN 325 MG/1
650 TABLET ORAL
Status: CANCELLED | OUTPATIENT
Start: 2020-08-24

## 2020-08-14 RX ORDER — ONDANSETRON 2 MG/ML
8 INJECTION INTRAMUSCULAR; INTRAVENOUS AS NEEDED
Status: CANCELLED | OUTPATIENT
Start: 2020-08-24

## 2020-08-14 RX ORDER — ALBUTEROL SULFATE 0.83 MG/ML
2.5 SOLUTION RESPIRATORY (INHALATION) AS NEEDED
Status: CANCELLED
Start: 2020-08-24

## 2020-08-14 RX ORDER — DIPHENHYDRAMINE HYDROCHLORIDE 50 MG/ML
25 INJECTION, SOLUTION INTRAMUSCULAR; INTRAVENOUS AS NEEDED
Status: CANCELLED
Start: 2020-08-24

## 2020-08-14 RX ORDER — ACETAMINOPHEN 325 MG/1
650 TABLET ORAL AS NEEDED
Status: CANCELLED
Start: 2020-08-24

## 2020-08-24 ENCOUNTER — HOSPITAL ENCOUNTER (OUTPATIENT)
Dept: INFUSION THERAPY | Age: 40
Discharge: HOME | End: 2020-08-24
Payer: COMMERCIAL

## 2020-08-24 ENCOUNTER — OFFICE VISIT (OUTPATIENT)
Dept: ONCOLOGY | Age: 40
End: 2020-08-24
Payer: COMMERCIAL

## 2020-08-24 ENCOUNTER — RESEARCH ENCOUNTER (OUTPATIENT)
Dept: ONCOLOGY | Age: 40
End: 2020-08-24

## 2020-08-24 VITALS
WEIGHT: 122 LBS | TEMPERATURE: 99.3 F | OXYGEN SATURATION: 97 % | SYSTOLIC BLOOD PRESSURE: 135 MMHG | BODY MASS INDEX: 20.83 KG/M2 | HEIGHT: 64 IN | DIASTOLIC BLOOD PRESSURE: 83 MMHG | RESPIRATION RATE: 18 BRPM | HEART RATE: 69 BPM

## 2020-08-24 VITALS
HEIGHT: 64 IN | HEART RATE: 56 BPM | WEIGHT: 122 LBS | RESPIRATION RATE: 16 BRPM | TEMPERATURE: 99.3 F | BODY MASS INDEX: 20.83 KG/M2 | SYSTOLIC BLOOD PRESSURE: 126 MMHG | OXYGEN SATURATION: 97 % | DIASTOLIC BLOOD PRESSURE: 81 MMHG

## 2020-08-24 DIAGNOSIS — Z79.899 ENCOUNTER FOR MONITORING CARDIOTOXIC DRUG THERAPY: ICD-10-CM

## 2020-08-24 DIAGNOSIS — C50.912 STAGE II BREAST CANCER, LEFT (HCC): Primary | ICD-10-CM

## 2020-08-24 DIAGNOSIS — Z17.0 MALIGNANT NEOPLASM OF CENTRAL PORTION OF LEFT BREAST IN FEMALE, ESTROGEN RECEPTOR POSITIVE (HCC): Primary | ICD-10-CM

## 2020-08-24 DIAGNOSIS — C50.112 MALIGNANT NEOPLASM OF CENTRAL PORTION OF LEFT BREAST IN FEMALE, ESTROGEN RECEPTOR POSITIVE (HCC): Primary | ICD-10-CM

## 2020-08-24 DIAGNOSIS — Z51.11 CHEMOTHERAPY MANAGEMENT, ENCOUNTER FOR: ICD-10-CM

## 2020-08-24 DIAGNOSIS — Z51.81 ENCOUNTER FOR MONITORING CARDIOTOXIC DRUG THERAPY: ICD-10-CM

## 2020-08-24 LAB
ALBUMIN SERPL-MCNC: 4.3 G/DL (ref 3.5–5)
ALBUMIN/GLOB SERPL: 1.4 {RATIO} (ref 1.1–2.2)
ALP SERPL-CCNC: 75 U/L (ref 45–117)
ALT SERPL-CCNC: 65 U/L (ref 12–78)
ANION GAP SERPL CALC-SCNC: 6 MMOL/L (ref 5–15)
AST SERPL-CCNC: 44 U/L (ref 15–37)
BASOPHILS # BLD: 0 K/UL (ref 0–0.1)
BASOPHILS NFR BLD: 0 % (ref 0–1)
BILIRUB SERPL-MCNC: 0.5 MG/DL (ref 0.2–1)
BUN SERPL-MCNC: 10 MG/DL (ref 6–20)
BUN/CREAT SERPL: 14 (ref 12–20)
CALCIUM SERPL-MCNC: 9 MG/DL (ref 8.5–10.1)
CHLORIDE SERPL-SCNC: 106 MMOL/L (ref 97–108)
CO2 SERPL-SCNC: 26 MMOL/L (ref 21–32)
CREAT SERPL-MCNC: 0.7 MG/DL (ref 0.55–1.02)
DIFFERENTIAL METHOD BLD: ABNORMAL
EOSINOPHIL # BLD: 0 K/UL (ref 0–0.4)
EOSINOPHIL NFR BLD: 0 % (ref 0–7)
ERYTHROCYTE [DISTWIDTH] IN BLOOD BY AUTOMATED COUNT: 17.3 % (ref 11.5–14.5)
GLOBULIN SER CALC-MCNC: 3.1 G/DL (ref 2–4)
GLUCOSE SERPL-MCNC: 149 MG/DL (ref 65–100)
HCT VFR BLD AUTO: 30 % (ref 35–47)
HGB BLD-MCNC: 10.2 G/DL (ref 11.5–16)
IMM GRANULOCYTES # BLD AUTO: 0.1 K/UL (ref 0–0.04)
IMM GRANULOCYTES NFR BLD AUTO: 1 % (ref 0–0.5)
LYMPHOCYTES # BLD: 0.6 K/UL (ref 0.8–3.5)
LYMPHOCYTES NFR BLD: 10 % (ref 12–49)
MCH RBC QN AUTO: 33.6 PG (ref 26–34)
MCHC RBC AUTO-ENTMCNC: 34 G/DL (ref 30–36.5)
MCV RBC AUTO: 98.7 FL (ref 80–99)
MONOCYTES # BLD: 0.4 K/UL (ref 0–1)
MONOCYTES NFR BLD: 6 % (ref 5–13)
NEUTS SEG # BLD: 5.2 K/UL (ref 1.8–8)
NEUTS SEG NFR BLD: 83 % (ref 32–75)
NRBC # BLD: 0 K/UL (ref 0–0.01)
NRBC BLD-RTO: 0 PER 100 WBC
PLATELET # BLD AUTO: 98 K/UL (ref 150–400)
PMV BLD AUTO: 10.2 FL (ref 8.9–12.9)
POTASSIUM SERPL-SCNC: 3.6 MMOL/L (ref 3.5–5.1)
PROT SERPL-MCNC: 7.4 G/DL (ref 6.4–8.2)
RBC # BLD AUTO: 3.04 M/UL (ref 3.8–5.2)
RBC MORPH BLD: ABNORMAL
SODIUM SERPL-SCNC: 138 MMOL/L (ref 136–145)
WBC # BLD AUTO: 6.3 K/UL (ref 3.6–11)

## 2020-08-24 PROCEDURE — 85025 COMPLETE CBC W/AUTO DIFF WBC: CPT

## 2020-08-24 PROCEDURE — 74011250636 HC RX REV CODE- 250/636: Performed by: INTERNAL MEDICINE

## 2020-08-24 PROCEDURE — 36415 COLL VENOUS BLD VENIPUNCTURE: CPT

## 2020-08-24 PROCEDURE — 96375 TX/PRO/DX INJ NEW DRUG ADDON: CPT

## 2020-08-24 PROCEDURE — 96417 CHEMO IV INFUS EACH ADDL SEQ: CPT

## 2020-08-24 PROCEDURE — 96377 APPLICATON ON-BODY INJECTOR: CPT

## 2020-08-24 PROCEDURE — 96367 TX/PROPH/DG ADDL SEQ IV INF: CPT

## 2020-08-24 PROCEDURE — 96413 CHEMO IV INFUSION 1 HR: CPT

## 2020-08-24 PROCEDURE — 74011000258 HC RX REV CODE- 258: Performed by: INTERNAL MEDICINE

## 2020-08-24 PROCEDURE — 80053 COMPREHEN METABOLIC PANEL: CPT

## 2020-08-24 PROCEDURE — 99214 OFFICE O/P EST MOD 30 MIN: CPT | Performed by: INTERNAL MEDICINE

## 2020-08-24 PROCEDURE — 77030012965 HC NDL HUBR BBMI -A

## 2020-08-24 PROCEDURE — 74011000250 HC RX REV CODE- 250: Performed by: INTERNAL MEDICINE

## 2020-08-24 RX ORDER — HEPARIN 100 UNIT/ML
300-500 SYRINGE INTRAVENOUS AS NEEDED
Status: ACTIVE | OUTPATIENT
Start: 2020-08-24 | End: 2020-08-24

## 2020-08-24 RX ORDER — SODIUM CHLORIDE 0.9 % (FLUSH) 0.9 %
10 SYRINGE (ML) INJECTION AS NEEDED
Status: DISPENSED | OUTPATIENT
Start: 2020-08-24 | End: 2020-08-24

## 2020-08-24 RX ORDER — PALONOSETRON 0.05 MG/ML
0.25 INJECTION, SOLUTION INTRAVENOUS ONCE
Status: COMPLETED | OUTPATIENT
Start: 2020-08-24 | End: 2020-08-24

## 2020-08-24 RX ORDER — DEXAMETHASONE SODIUM PHOSPHATE 100 MG/10ML
10 INJECTION INTRAMUSCULAR; INTRAVENOUS ONCE
Status: COMPLETED | OUTPATIENT
Start: 2020-08-24 | End: 2020-08-24

## 2020-08-24 RX ORDER — SODIUM CHLORIDE 9 MG/ML
25 INJECTION, SOLUTION INTRAVENOUS CONTINUOUS
Status: DISPENSED | OUTPATIENT
Start: 2020-08-24 | End: 2020-08-24

## 2020-08-24 RX ORDER — SODIUM CHLORIDE 9 MG/ML
10 INJECTION INTRAMUSCULAR; INTRAVENOUS; SUBCUTANEOUS AS NEEDED
Status: ACTIVE | OUTPATIENT
Start: 2020-08-24 | End: 2020-08-24

## 2020-08-24 RX ADMIN — SODIUM CHLORIDE 10 ML: 9 INJECTION, SOLUTION INTRAMUSCULAR; INTRAVENOUS; SUBCUTANEOUS at 08:03

## 2020-08-24 RX ADMIN — FOSAPREPITANT 150 MG: 150 INJECTION, POWDER, LYOPHILIZED, FOR SOLUTION INTRAVENOUS at 12:53

## 2020-08-24 RX ADMIN — TRASTUZUMAB 344 MG: 150 INJECTION, POWDER, LYOPHILIZED, FOR SOLUTION INTRAVENOUS at 11:36

## 2020-08-24 RX ADMIN — PALONOSETRON 0.25 MG: 0.05 INJECTION, SOLUTION INTRAVENOUS at 12:47

## 2020-08-24 RX ADMIN — DEXAMETHASONE SODIUM PHOSPHATE 10 MG: 10 INJECTION INTRAMUSCULAR; INTRAVENOUS at 12:52

## 2020-08-24 RX ADMIN — Medication 500 UNITS: at 15:35

## 2020-08-24 RX ADMIN — PERTUZUMAB 420 MG: 30 INJECTION, SOLUTION, CONCENTRATE INTRAVENOUS at 12:12

## 2020-08-24 RX ADMIN — SODIUM CHLORIDE 25 ML/HR: 900 INJECTION, SOLUTION INTRAVENOUS at 10:44

## 2020-08-24 RX ADMIN — Medication 10 ML: at 15:35

## 2020-08-24 RX ADMIN — CARBOPLATIN 704 MG: 10 INJECTION INTRAVENOUS at 14:59

## 2020-08-24 RX ADMIN — PEGFILGRASTIM 6 MG: KIT SUBCUTANEOUS at 15:35

## 2020-08-24 RX ADMIN — SODIUM CHLORIDE 121 MG: 900 INJECTION, SOLUTION INTRAVENOUS at 13:48

## 2020-08-24 NOTE — PROGRESS NOTES
[facility] VISIT NOTE    0750. Pt arrived at [facility] ambulatory and in no distress for C5D1 TCHP. Assessment completed, pt with no acute complaints or concerns. Pt states she is feeling good today and for the past week. Pt confirmed that she took home dexamethasone yesterday. RCW chest port accessed with . 75 in le with no difficulty. Positive blood return noted and labs drawn. Pt proceeded to MD apt.    4403. Labs resulted. Notified MD office of platelet results. 1045. Received ok to treat with platelets of 98. Medications received:  NS KVO  Herceptin IV  Perjeta IV  Aloxi IV  Dexamethasone iV  Emend IV  Docetaxel IV  Carboplatin IV  Neulasta OBI    Tolerated treatment well, no adverse reaction noted. Port de-accessed and flushed per protocol. Positive blood return noted. Patient Vitals for the past 12 hrs:   Temp Pulse Resp BP SpO2   08/24/20 1532 -- (!) 56 16 126/81 --   08/24/20 0750 99.3 °F (37.4 °C) 69 18 135/83 97 %     Recent Results (from the past 12 hour(s))   METABOLIC PANEL, COMPREHENSIVE    Collection Time: 08/24/20  8:02 AM   Result Value Ref Range    Sodium 138 136 - 145 mmol/L    Potassium 3.6 3.5 - 5.1 mmol/L    Chloride 106 97 - 108 mmol/L    CO2 26 21 - 32 mmol/L    Anion gap 6 5 - 15 mmol/L    Glucose 149 (H) 65 - 100 mg/dL    BUN 10 6 - 20 MG/DL    Creatinine 0.70 0.55 - 1.02 MG/DL    BUN/Creatinine ratio 14 12 - 20      GFR est AA >60 >60 ml/min/1.73m2    GFR est non-AA >60 >60 ml/min/1.73m2    Calcium 9.0 8.5 - 10.1 MG/DL    Bilirubin, total 0.5 0.2 - 1.0 MG/DL    ALT (SGPT) 65 12 - 78 U/L    AST (SGOT) 44 (H) 15 - 37 U/L    Alk.  phosphatase 75 45 - 117 U/L    Protein, total 7.4 6.4 - 8.2 g/dL    Albumin 4.3 3.5 - 5.0 g/dL    Globulin 3.1 2.0 - 4.0 g/dL    A-G Ratio 1.4 1.1 - 2.2     CBC WITH AUTOMATED DIFF    Collection Time: 08/24/20  8:16 AM   Result Value Ref Range    WBC 6.3 3.6 - 11.0 K/uL    RBC 3.04 (L) 3.80 - 5.20 M/uL    HGB 10.2 (L) 11.5 - 16.0 g/dL    HCT 30.0 (L) 35.0 - 47.0 %    MCV 98.7 80.0 - 99.0 FL    MCH 33.6 26.0 - 34.0 PG    MCHC 34.0 30.0 - 36.5 g/dL    RDW 17.3 (H) 11.5 - 14.5 %    PLATELET 98 (L) 443 - 400 K/uL    MPV 10.2 8.9 - 12.9 FL    NRBC 0.0 0  WBC    ABSOLUTE NRBC 0.00 0.00 - 0.01 K/uL    NEUTROPHILS 83 (H) 32 - 75 %    LYMPHOCYTES 10 (L) 12 - 49 %    MONOCYTES 6 5 - 13 %    EOSINOPHILS 0 0 - 7 %    BASOPHILS 0 0 - 1 %    IMMATURE GRANULOCYTES 1 (H) 0.0 - 0.5 %    ABS. NEUTROPHILS 5.2 1.8 - 8.0 K/UL    ABS. LYMPHOCYTES 0.6 (L) 0.8 - 3.5 K/UL    ABS. MONOCYTES 0.4 0.0 - 1.0 K/UL    ABS. EOSINOPHILS 0.0 0.0 - 0.4 K/UL    ABS. BASOPHILS 0.0 0.0 - 0.1 K/UL    ABS. IMM. GRANS. 0.1 (H) 0.00 - 0.04 K/UL    DF SMEAR SCANNED      RBC COMMENTS OVALOCYTES  PRESENT         1535. D/C'd from [facility] ambulatory and in no distress.  Next appointment is 9/14/20 at 9:00 am.

## 2020-08-24 NOTE — PROGRESS NOTES
[facility] [place] at [address], [address] [facility] 100[address] [facility]  W: [phone]  F: [phone]      Reason for Visit:   Odilia Schirmer is a 38 y.o. female who is seen in consultation at the request of Dr. Sophy Allred for evaluation of therapy for breast cancer    Plastic surg:  Dr. Kaur Hannon onc:  Dr. Betsy Eisenmenger    Treatment History:   · 20 left breast ax core bx: Adenocarcinoma, ER + at 86% ; SC + at 69%; HER 2 POSITIVE (IHC 2+, FISH ratio 3.2; sig/cell 6.08), ki67 29%  · 20 L breast core bx: Atypical 1 mm focus, highly suspicious for [facility], lobular features, the tumor does not histologically match the patient's prior axillary cancer, but could represent a small sample of the same tumor  · 20 L breast excisional bx: Subareolar lumpectomy, IDC, 1.6 cm, invasive tumor present at anterior and lateral margins, gr 2, + LVI, 1/1 LN involved, 1.1 cm, ER + at 48%, SC + at 72%, HER 2 POSITIVE (IHC 2+, FISH ratio 2, sig/cell 4.14)  · 20 Herminio Alvarez genetic testing:  negative  · TCHP 2020-     History of Present Illness:   She noticed a L axilla mass in 2020, leading to the pathology above. Interval history:  In today for follow up and treatment. Complains of gr 1 diarrhea, gr 1 nausea, gr 1 hot flashes, gr 1 tachycardia, gr 1 itching, gr 1 neuropathy.     FH:  No breast, ovarian, prostate, or pancreas cancer    Past Medical History:   Diagnosis Date    Anxiety     Cancer [facility])     Essential hypertension     HX OTHER MEDICAL ,         Infertility     IVF with first pregnancy    Infertility, female     Joint pain     Psychiatric problem       Past Surgical History:   Procedure Laterality Date    HX OTHER SURGICAL      [place] Teeth Removal    HX OTHER SURGICAL      2017 Excision of mole on toe with skin graph      Social History     Tobacco Use    Smoking status: Never Smoker    Smokeless tobacco: Never Used   Substance Use Topics    Alcohol use: Yes     Frequency: 4 or more times a week      Family History   Problem Relation Age of Onset    Diabetes Mother     Heart Disease Mother     Hypertension Mother     Hypertension Father     Cancer Paternal Grandmother         Lung cancer    Stroke Paternal Grandmother     Cancer Paternal Grandfather         Melanoma     Current Outpatient Medications   Medication Sig    carvediloL (COREG) 3.125 mg tablet Take 1 Tab by mouth two (2) times daily (with meals).  magic mouthwash solution Magic mouth wash   Maalox  Lidocaine 2% viscous   Diphenhydramine oral solution     Pharmacy to mix equal portions of ingredients to a total volume as indicated in the dispense amount. 10-15 ml swish/spit and may swallow for throat pain every 4 hours PRN.  doxycycline (VIBRAMYCIN) 100 mg capsule TAKE 1 CAPSULE BY MOUTH TWICE A DAY *INS LIMITS 30 DAY    prochlorperazine (COMPAZINE) 10 mg tablet Take 1 Tab by mouth every six (6) hours as needed for Nausea.  dexAMETHasone (DECADRON) 4 mg tablet 8 mg bid the day before and day after chemo    lidocaine-prilocaine (EMLA) topical cream Apply  to affected area as needed for Pain.  ondansetron (ZOFRAN ODT) 8 mg disintegrating tablet Take 1 Tab by mouth every eight (8) hours as needed for Nausea or Vomiting. For 2 days following chemo    ibuprofen (MOTRIN) 600 mg tablet Take 1 Tab by mouth every six (6) hours as needed for Pain.  sertraline (ZOLOFT) 25 mg tablet Take 25 mg by mouth daily.  PNV No.22-Iron Cbn&Gluc-FA-DOS 27-1-50 mg Tab Take  by mouth. Indications: PREGNANCY     No current facility-administered medications for this visit.       Facility-Administered Medications Ordered in Other Visits   Medication Dose Route Frequency    saline peripheral flush soln 10 mL  10 mL InterCATHeter PRN    0.9% sodium chloride injection 10 mL  10 mL IntraVENous PRN    heparin (porcine) pf 300-500 Units  300-500 Units InterCATHeter PRN      No Known Allergies     Review of Systems: A complete review of systems was obtained, negative except as described above. Physical Exam:     Visit Vitals  /83 (BP 1 Location: Left arm, BP Patient Position: Sitting)   Pulse 69   Temp 99.3 °F (37.4 °C) (Temporal)   Resp 18   Ht 5' 4\" (1.626 m)   Wt 122 lb (55.3 kg)   SpO2 97%   BMI 20.94 kg/m²     ECOG PS: 0    Constitutional: Appears well-developed and well-nourished in no apparent distress      Mental status: Alert and awake, Oriented to person/place/time, Able to follow commands    Eyes: EOM normal, Sclera normal, No visible ocular discharge    HENT: Normocephalic, atraumatic    Mouth/Throat: Moist mucous membranes    External Ears normal    Neck: No visualized mass    Pulmonary/Chest: Respiratory effort normal, No visualized signs of difficulty breathing or respiratory distress    Musculoskeletal: Normal gait with no signs of ataxia, Normal range of motion of neck    Neurological: No facial asymmetry (Cranial nerve 7 motor function), No gaze palsy    Skin: No significant exanthematous lesions or discoloration noted on facial skin    Psychiatric: Normal affect, No hallucinations     Breasts: L breast 1 cm subareolar mass; 1 cm L ax LN         Results:     Lab Results   Component Value Date/Time    WBC 6.3 08/24/2020 08:16 AM    HGB 10.2 (L) 08/24/2020 08:16 AM    HCT 30.0 (L) 08/24/2020 08:16 AM    PLATELET 98 (L) 38/26/8926 08:16 AM    MCV 98.7 08/24/2020 08:16 AM    ABS.  NEUTROPHILS 5.2 08/24/2020 08:16 AM     Lab Results   Component Value Date/Time    Sodium 138 08/24/2020 08:02 AM    Potassium 3.6 08/24/2020 08:02 AM    Chloride 106 08/24/2020 08:02 AM    CO2 26 08/24/2020 08:02 AM    Glucose 149 (H) 08/24/2020 08:02 AM    BUN 10 08/24/2020 08:02 AM    Creatinine 0.70 08/24/2020 08:02 AM    GFR est AA >60 08/24/2020 08:02 AM    GFR est non-AA >60 08/24/2020 08:02 AM    Calcium 9.0 08/24/2020 08:02 AM     Lab Results   Component Value Date/Time    Bilirubin, total 0.5 08/24/2020 08:02 AM    ALT (SGPT) 65 08/24/2020 08:02 AM    Alk. phosphatase 75 08/24/2020 08:02 AM    Protein, total 7.4 08/24/2020 08:02 AM    Albumin 4.3 08/24/2020 08:02 AM    Globulin 3.1 08/24/2020 08:02 AM     5/20/20 breast  MRI  Subareolar region of L breast 1.2 cm mass, at least 2 LN on left, 3 cm largest  Right breast negative    5/20/20 bone scan  Heterogeneous activity in the ribs of uncertain significance. This would be an unusual presentation of bony metastatic disease    5/20/20 CT c/a/p  negative    Records reviewed and summarized above. Pathology report(s) reviewed above. Radiology report(s) reviewed above. Assessment/plan:   1.  L breast cancer, LN + by core, ER +, [place] +, HER 2 POSITIVE, cT1c cN1a cM0:  Stage IIA, prognostic stage IB    We explained to the patient that the goal of systemic adjuvant therapy is to improve the chances for cure and decrease the risk of relapse. We explained why a patient can have microscopic cancer spread now even though physical examination, laboratory studies and imaging studies are negative for cancer. We explained that the same treatments used now as adjuvant or preventive treatments rarely if ever are curative in women who develop metastases. We discussed that there is no difference in overall survival between neoadjuvant and adjuvant chemotherapy. We discussed the rationale for neoadjuvant chemotherapy, if chemotherapy is warranted, as it is in this case: to avoid any potential delays in giving chemotherapy following surgery, to be able to see the response of the tumor to chemotherapy, and to potentially downstage the tumor prior to surgery. [facility] suggests equivalency between q.3 week Adriamycin, Cytoxan followed by weekly paclitaxel and trastuzumab compared with the [facility] regimen.  However, this study was not powered to show a difference between these two regimens, and in the [facility] publication, the AC-TH arm showed a numerically advantange (though not statistically significant) to the [employer] arm. In this patient, it is completely reasonable to use [employer] approach and avoid the potential cardiotoxicity of the anthracyclines as well as the potential for leukemia. We discussed the toxicities of docetaxel and carboplatin chemotherapy in detail. This chemotherapy frequently causes a low white blood cell count and hospital admissions for treatment of neutropenic fever. We explained that we consider the use of growth factors to minimize this risk. We explained to the patient that some side effects if they occur only last a few days including nausea, vomiting, stomatitis, arthralgia, myalgia,and allergic reactions to Taxotere. We told the patient that severe nausea and vomiting were uncommon and that some side effects,if they occur, will last longer; this includes hair loss, which will be seen in all patients treated with these agents and fatigue,which will be seen in most.  We also informed that for the patient that heart damage is rare with these agents. We explained that carboplatin can rarely cause kidney damage and high frequency hearing loss. We provided the patient in detail her information concerning the toxicities of this regimen in addition to her overall discussion. Rationale for therapy with trastuzumab was also discussed with the patient including a 50% proportional improvement in disease free survival and also an improvement in overall survival in patients receiving trastuzumab and chemotherapy for HER-2 positive breast cancer. The side effects of trastuzumab were discussed including a 4%-5% risk of dropping her ejection fraction while on treatment and about a 1% risk of CHF. We discussed that this drug will be used every 3 weeks for remainder of a year following the chemotherapy cycles. We will check her EF before chemotherapy and every 3 months while she is receiving trastuzumab.     Rational for therapy with pertuzumab was also discussed with the patient including a nearly 20% improvement seen in pCR in the neoadjuvant setting with the addition of this medication. Additional AE discussed including an acne rash (and the use of doxycyline 100 mg bid to help prevent) and diarrhea and consideration of additional cardiomyopathy. · TCH-P (Trastuzumab 8mg/kg load with cycle 1 then 6mg/kg, Docetaxel 75mg/m2, Carboplatin AUC 6, Pertuzumab 840mg load with cycle 1 then 420mg) given every 3 weeks x 6 cycles  · Labs: CBC, CMP prior to each treatment. · Antiemetic Prophylaxis: Palonosetron and dexamethasone prior to chemo  · PRN Antiemetics: Ondansetron, Compazine  · Swelling prophylaxis: Dexmethasone 8mg bid the day before and day after chemotherapy  · TTE prior to chemotherapy and every 12 weeks while on Trastuzumab  · Neulasta 24-72 hours after each treatment    Cold caps discussed. She is interested and got fitted and will use    Oral and peripheral cryotherapy discussed. Peripheral neuropathy trial discussed and research met with her (essential trial for her) and she has consented. Screened by me for compass HER 2 de-escalation study, but her primary tumor is not > 1.5 cm    Discussed outback HP if pCR, if no pCR, discussed T-DM1     After this discussion, she is agreeable to TCH-P as above, she has signed informed consent. TTE on 5/28/2020 EF 64%, TTE on 8/10/2020, EF 60%. Port placed by Dr. Huseyin Leigh on 5/27/20     Breast mass biopsied and clipped on 5/21/20, also had surgical biopsy on 5/27/2020. She had follow up with Dr. Carlos Rothman on 6/3/2020. The patient was given presciptions for a wig, emla cream, decadron to take 8 mg bid the day before and day after chemo, zofran and compazine. Neulasta the following day. Will proceed with TCHP #5 today. She had follow up with Dr. Fontanez Courser on 7/20/2020. IUD was removed by gyn. Will ask for receptors to be done on 5/27/20 lumpectomy specimen    2.  Emotional well being: She has excellent support and is coping well with her disease    3. Genetic testing:  discussed potential ramifications of a positive test including the potential need for bilateral mastectomies and bilateral oophorectomies and the risk then for her family members to also have a mutation. VUS also discussed. Tested in Dr. Hunter Swenson office on 5/21/20, negative    4. Loss of fertility:  Discussed potential loss of menses and fertility. She is not interested in having further children. Declines oncofertility consult    5. Bone pain: Due to neulasta. Recommend claritin daily. Mild improvement tylenol PRN. Recommend Ibuprofen BID for 3 days along with tylenol PRN. 6. Diarrhea: Due to chemo. Managed by Imodium PRN and increased hydration. 7. Throat pain:  Magic mouthwash PRN, rx in.     8. 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 9. Blood on toilet paper:  Denies blood in stool, or large amounts of blood. Most likely due to diarrhea/possible hemorrhoids. None with past treatment. 10.  Decline in LV strain: 7.8% decline since initial TTE on 5/28/2020, started on Coreg 3.125 mg BID. Repeat TTE ordered for 9/10/2020. 11. Thrombocytopenia:  Due to chemo. Will monitor. Ok to treat today; if continues to lower, may have to hold next dose    12. Anemia:  Due to chemo, will monitor    I appreciate the opportunity to participate in Ms. Vinny Mulligan's care. Signed By: Cathy Daniel MD      No orders of the defined types were placed in this encounter.

## 2020-08-24 NOTE — PROGRESS NOTES
Pt in for labs and follow up visit today per protocol with Dr. David Vásquez and RN. This is week 12 of treatment. Vital signs are stable. Patient to go to infusion center after appointment to receive [facility]. Required assessments and QOLs completed per protocol. Next appt is scheduled for 12 weeks.

## 2020-08-24 NOTE — PROGRESS NOTES
Ying Dick is a 38 y.o. female Follow up for the Evaluation of Breast Cancer. 1. Have you been to the ER, urgent care clinic since your last visit? Hospitalized since your last visit? No    2. Have you seen or consulted any other health care providers outside of the [address] [place] [address] since your last visit? Include any pap smears or colon screening.  No

## 2020-09-04 RX ORDER — HYDROCORTISONE SODIUM SUCCINATE 100 MG/2ML
100 INJECTION, POWDER, FOR SOLUTION INTRAMUSCULAR; INTRAVENOUS AS NEEDED
Status: CANCELLED | OUTPATIENT
Start: 2020-09-14

## 2020-09-04 RX ORDER — EPINEPHRINE 1 MG/ML
0.3 INJECTION, SOLUTION, CONCENTRATE INTRAVENOUS AS NEEDED
Status: CANCELLED | OUTPATIENT
Start: 2020-09-14

## 2020-09-04 RX ORDER — DIPHENHYDRAMINE HYDROCHLORIDE 50 MG/ML
50 INJECTION, SOLUTION INTRAMUSCULAR; INTRAVENOUS
Status: CANCELLED | OUTPATIENT
Start: 2020-09-14

## 2020-09-04 RX ORDER — ALBUTEROL SULFATE 0.83 MG/ML
2.5 SOLUTION RESPIRATORY (INHALATION) AS NEEDED
Status: CANCELLED
Start: 2020-09-14

## 2020-09-04 RX ORDER — DIPHENHYDRAMINE HYDROCHLORIDE 50 MG/ML
50 INJECTION, SOLUTION INTRAMUSCULAR; INTRAVENOUS AS NEEDED
Status: CANCELLED
Start: 2020-09-14

## 2020-09-04 RX ORDER — ONDANSETRON 2 MG/ML
8 INJECTION INTRAMUSCULAR; INTRAVENOUS AS NEEDED
Status: CANCELLED | OUTPATIENT
Start: 2020-09-14

## 2020-09-04 RX ORDER — DIPHENHYDRAMINE HYDROCHLORIDE 50 MG/ML
25 INJECTION, SOLUTION INTRAMUSCULAR; INTRAVENOUS AS NEEDED
Status: CANCELLED
Start: 2020-09-14

## 2020-09-04 RX ORDER — ACETAMINOPHEN 325 MG/1
650 TABLET ORAL AS NEEDED
Status: CANCELLED
Start: 2020-09-14

## 2020-09-04 RX ORDER — ACETAMINOPHEN 325 MG/1
650 TABLET ORAL
Status: CANCELLED | OUTPATIENT
Start: 2020-09-14

## 2020-09-10 ENCOUNTER — ANCILLARY PROCEDURE (OUTPATIENT)
Dept: CARDIOLOGY CLINIC | Age: 40
End: 2020-09-10
Payer: COMMERCIAL

## 2020-09-10 VITALS
BODY MASS INDEX: 20.83 KG/M2 | DIASTOLIC BLOOD PRESSURE: 78 MMHG | WEIGHT: 122 LBS | SYSTOLIC BLOOD PRESSURE: 122 MMHG | HEIGHT: 64 IN

## 2020-09-10 DIAGNOSIS — C50.112 MALIGNANT NEOPLASM OF CENTRAL PORTION OF LEFT BREAST IN FEMALE, ESTROGEN RECEPTOR POSITIVE (HCC): ICD-10-CM

## 2020-09-10 DIAGNOSIS — Z79.899 ENCOUNTER FOR MONITORING CARDIOTOXIC DRUG THERAPY: ICD-10-CM

## 2020-09-10 DIAGNOSIS — Z17.0 MALIGNANT NEOPLASM OF CENTRAL PORTION OF LEFT BREAST IN FEMALE, ESTROGEN RECEPTOR POSITIVE (HCC): ICD-10-CM

## 2020-09-10 DIAGNOSIS — Z51.81 ENCOUNTER FOR MONITORING CARDIOTOXIC DRUG THERAPY: ICD-10-CM

## 2020-09-10 LAB
ECHO AO ASC DIAM: 2.68 CM
ECHO AO ROOT DIAM: 2.74 CM
ECHO AV AREA PEAK VELOCITY: 1.97 CM2
ECHO AV AREA VTI: 2.25 CM2
ECHO AV AREA/BSA PEAK VELOCITY: 1.2 CM2/M2
ECHO AV AREA/BSA VTI: 1.4 CM2/M2
ECHO AV MEAN GRADIENT: 3.18 MMHG
ECHO AV PEAK GRADIENT: 6.18 MMHG
ECHO AV PEAK VELOCITY: 124.3 CM/S
ECHO AV VTI: 23.44 CM
ECHO LA AREA 4C: 15.58 CM2
ECHO LA MAJOR AXIS: 3.19 CM
ECHO LA MINOR AXIS: 2.01 CM
ECHO LA VOL 2C: 32.38 ML (ref 22–52)
ECHO LA VOL 4C: 38.47 ML (ref 22–52)
ECHO LA VOL BP: 41.09 ML (ref 22–52)
ECHO LA VOL/BSA BIPLANE: 25.92 ML/M2 (ref 16–28)
ECHO LA VOLUME INDEX A2C: 20.42 ML/M2 (ref 16–28)
ECHO LA VOLUME INDEX A4C: 24.27 ML/M2 (ref 16–28)
ECHO LV E' LATERAL VELOCITY: 11.03 CM/S
ECHO LV E' SEPTAL VELOCITY: 10.21 CM/S
ECHO LV EDV A2C: 85.6 ML
ECHO LV EDV A4C: 105.61 ML
ECHO LV EDV BP: 98.02 ML (ref 56–104)
ECHO LV EDV INDEX A4C: 66.6 ML/M2
ECHO LV EDV INDEX BP: 61.8 ML/M2
ECHO LV EDV NDEX A2C: 54 ML/M2
ECHO LV EJECTION FRACTION A2C: 65 PERCENT
ECHO LV EJECTION FRACTION A4C: 59 PERCENT
ECHO LV EJECTION FRACTION BIPLANE: 61 PERCENT (ref 55–100)
ECHO LV ESV A2C: 30.17 ML
ECHO LV ESV A4C: 43.25 ML
ECHO LV ESV BP: 38.28 ML (ref 19–49)
ECHO LV ESV INDEX A2C: 19 ML/M2
ECHO LV ESV INDEX A4C: 27.3 ML/M2
ECHO LV ESV INDEX BP: 24.1 ML/M2
ECHO LV GLOBAL LONGITUDINAL STRAIN (GLS): 20.9 PERCENT
ECHO LV INTERNAL DIMENSION DIASTOLIC: 4.64 CM (ref 3.9–5.3)
ECHO LV INTERNAL DIMENSION SYSTOLIC: 3.16 CM
ECHO LV IVSD: 0.79 CM (ref 0.6–0.9)
ECHO LV MASS 2D: 119.6 G (ref 67–162)
ECHO LV MASS INDEX 2D: 75.5 G/M2 (ref 43–95)
ECHO LV POSTERIOR WALL DIASTOLIC: 0.81 CM (ref 0.6–0.9)
ECHO LVOT DIAM: 1.96 CM
ECHO LVOT PEAK GRADIENT: 2.64 MMHG
ECHO LVOT PEAK VELOCITY: 81.3 CM/S
ECHO LVOT SV: 52.7 ML
ECHO LVOT VTI: 17.48 CM
ECHO MV A VELOCITY: 73.97 CM/S
ECHO MV AREA PHT: 4.64 CM2
ECHO MV E DECELERATION TIME (DT): 0.16 S
ECHO MV E VELOCITY: 94.12 CM/S
ECHO MV E/A RATIO: 1.27
ECHO MV E/E' LATERAL: 8.53
ECHO MV E/E' RATIO (AVERAGED): 8.88
ECHO MV E/E' SEPTAL: 9.22
ECHO MV PRESSURE HALF TIME (PHT): 0.05 S
ECHO RA AREA 4C: 10.83 CM2
ECHO RV INTERNAL DIMENSION: 2.77 CM
ECHO RV TAPSE: 2.65 CM (ref 1.5–2)
GLOBAL LONGITUDINAL STRAIN 2 CHAMBER: 21.8 PERCENT
GLOBAL LONGITUDINAL STRAIN 4 CHAMBER: 21.6 PERCENT
GLOBAL LONGITUDINAL STRAIN LONG AXIS: 19.5 PERCENT
LA VOL DISK BP: 38.02 ML (ref 22–52)

## 2020-09-10 PROCEDURE — 93306 TTE W/DOPPLER COMPLETE: CPT | Performed by: INTERNAL MEDICINE

## 2020-09-14 ENCOUNTER — OFFICE VISIT (OUTPATIENT)
Dept: ONCOLOGY | Age: 40
End: 2020-09-14
Payer: COMMERCIAL

## 2020-09-14 ENCOUNTER — HOSPITAL ENCOUNTER (OUTPATIENT)
Dept: INFUSION THERAPY | Age: 40
Discharge: HOME | End: 2020-09-14
Payer: COMMERCIAL

## 2020-09-14 VITALS
OXYGEN SATURATION: 98 % | SYSTOLIC BLOOD PRESSURE: 128 MMHG | BODY MASS INDEX: 20.78 KG/M2 | HEART RATE: 76 BPM | WEIGHT: 121.7 LBS | RESPIRATION RATE: 16 BRPM | HEIGHT: 64 IN | TEMPERATURE: 98.2 F | DIASTOLIC BLOOD PRESSURE: 85 MMHG

## 2020-09-14 VITALS
RESPIRATION RATE: 16 BRPM | HEART RATE: 76 BPM | HEIGHT: 64 IN | BODY MASS INDEX: 20.66 KG/M2 | WEIGHT: 121 LBS | DIASTOLIC BLOOD PRESSURE: 85 MMHG | SYSTOLIC BLOOD PRESSURE: 128 MMHG | TEMPERATURE: 98.2 F | OXYGEN SATURATION: 98 %

## 2020-09-14 DIAGNOSIS — C50.112 MALIGNANT NEOPLASM OF CENTRAL PORTION OF LEFT BREAST IN FEMALE, ESTROGEN RECEPTOR POSITIVE (HCC): Primary | ICD-10-CM

## 2020-09-14 DIAGNOSIS — C50.912 STAGE II BREAST CANCER, LEFT (HCC): Primary | ICD-10-CM

## 2020-09-14 DIAGNOSIS — Z17.0 MALIGNANT NEOPLASM OF CENTRAL PORTION OF LEFT BREAST IN FEMALE, ESTROGEN RECEPTOR POSITIVE (HCC): Primary | ICD-10-CM

## 2020-09-14 LAB
ALBUMIN SERPL-MCNC: 4.1 G/DL (ref 3.5–5)
ALBUMIN/GLOB SERPL: 1.4 {RATIO} (ref 1.1–2.2)
ALP SERPL-CCNC: 66 U/L (ref 45–117)
ALT SERPL-CCNC: 59 U/L (ref 12–78)
ANION GAP SERPL CALC-SCNC: 8 MMOL/L (ref 5–15)
AST SERPL-CCNC: 36 U/L (ref 15–37)
BASO+EOS+MONOS # BLD AUTO: 0.3 K/UL (ref 0.2–1.2)
BASO+EOS+MONOS NFR BLD AUTO: 6 % (ref 3.2–16.9)
BILIRUB SERPL-MCNC: 0.5 MG/DL (ref 0.2–1)
BUN SERPL-MCNC: 8 MG/DL (ref 6–20)
BUN/CREAT SERPL: 12 (ref 12–20)
CALCIUM SERPL-MCNC: 9 MG/DL (ref 8.5–10.1)
CHLORIDE SERPL-SCNC: 106 MMOL/L (ref 97–108)
CO2 SERPL-SCNC: 25 MMOL/L (ref 21–32)
CREAT SERPL-MCNC: 0.66 MG/DL (ref 0.55–1.02)
DIFFERENTIAL METHOD BLD: ABNORMAL
ERYTHROCYTE [DISTWIDTH] IN BLOOD BY AUTOMATED COUNT: 16.1 % (ref 11.8–15.8)
GLOBULIN SER CALC-MCNC: 3 G/DL (ref 2–4)
GLUCOSE SERPL-MCNC: 140 MG/DL (ref 65–100)
HCT VFR BLD AUTO: 29.3 % (ref 35–47)
HGB BLD-MCNC: 10.3 G/DL (ref 11.5–16)
LYMPHOCYTES # BLD: 0.6 K/UL (ref 0.8–3.5)
LYMPHOCYTES NFR BLD: 13 % (ref 12–49)
MCH RBC QN AUTO: 35.3 PG (ref 26–34)
MCHC RBC AUTO-ENTMCNC: 35.2 G/DL (ref 30–36.5)
MCV RBC AUTO: 100.3 FL (ref 80–99)
NEUTS SEG # BLD: 4.2 K/UL (ref 1.8–8)
NEUTS SEG NFR BLD: 81 % (ref 32–75)
PLATELET # BLD AUTO: 100 K/UL (ref 150–400)
POTASSIUM SERPL-SCNC: 3.8 MMOL/L (ref 3.5–5.1)
PROT SERPL-MCNC: 7.1 G/DL (ref 6.4–8.2)
RBC # BLD AUTO: 2.92 M/UL (ref 3.8–5.2)
SODIUM SERPL-SCNC: 139 MMOL/L (ref 136–145)
WBC # BLD AUTO: 5.1 K/UL (ref 3.6–11)

## 2020-09-14 PROCEDURE — 90686 IIV4 VACC NO PRSV 0.5 ML IM: CPT | Performed by: NURSE PRACTITIONER

## 2020-09-14 PROCEDURE — 99214 OFFICE O/P EST MOD 30 MIN: CPT | Performed by: INTERNAL MEDICINE

## 2020-09-14 PROCEDURE — 77030012965 HC NDL HUBR BBMI -A

## 2020-09-14 PROCEDURE — 74011250636 HC RX REV CODE- 250/636: Performed by: INTERNAL MEDICINE

## 2020-09-14 PROCEDURE — 96372 THER/PROPH/DIAG INJ SC/IM: CPT

## 2020-09-14 PROCEDURE — 74011250636 HC RX REV CODE- 250/636: Performed by: NURSE PRACTITIONER

## 2020-09-14 PROCEDURE — 85025 COMPLETE CBC W/AUTO DIFF WBC: CPT

## 2020-09-14 PROCEDURE — 96367 TX/PROPH/DG ADDL SEQ IV INF: CPT

## 2020-09-14 PROCEDURE — 90471 IMMUNIZATION ADMIN: CPT

## 2020-09-14 PROCEDURE — 36415 COLL VENOUS BLD VENIPUNCTURE: CPT

## 2020-09-14 PROCEDURE — 74011000258 HC RX REV CODE- 258: Performed by: INTERNAL MEDICINE

## 2020-09-14 PROCEDURE — 96375 TX/PRO/DX INJ NEW DRUG ADDON: CPT

## 2020-09-14 PROCEDURE — 80053 COMPREHEN METABOLIC PANEL: CPT

## 2020-09-14 PROCEDURE — 96417 CHEMO IV INFUS EACH ADDL SEQ: CPT

## 2020-09-14 PROCEDURE — 96377 APPLICATON ON-BODY INJECTOR: CPT

## 2020-09-14 PROCEDURE — 96413 CHEMO IV INFUSION 1 HR: CPT

## 2020-09-14 RX ORDER — SODIUM CHLORIDE 0.9 % (FLUSH) 0.9 %
10 SYRINGE (ML) INJECTION AS NEEDED
Status: DISPENSED | OUTPATIENT
Start: 2020-09-14 | End: 2020-09-14

## 2020-09-14 RX ORDER — SODIUM CHLORIDE 9 MG/ML
10 INJECTION INTRAMUSCULAR; INTRAVENOUS; SUBCUTANEOUS AS NEEDED
Status: ACTIVE | OUTPATIENT
Start: 2020-09-14 | End: 2020-09-14

## 2020-09-14 RX ORDER — HEPARIN 100 UNIT/ML
300-500 SYRINGE INTRAVENOUS AS NEEDED
Status: ACTIVE | OUTPATIENT
Start: 2020-09-14 | End: 2020-09-14

## 2020-09-14 RX ORDER — DEXAMETHASONE SODIUM PHOSPHATE 100 MG/10ML
10 INJECTION INTRAMUSCULAR; INTRAVENOUS ONCE
Status: COMPLETED | OUTPATIENT
Start: 2020-09-14 | End: 2020-09-14

## 2020-09-14 RX ORDER — PALONOSETRON 0.05 MG/ML
0.25 INJECTION, SOLUTION INTRAVENOUS ONCE
Status: COMPLETED | OUTPATIENT
Start: 2020-09-14 | End: 2020-09-14

## 2020-09-14 RX ORDER — SODIUM CHLORIDE 9 MG/ML
25 INJECTION, SOLUTION INTRAVENOUS CONTINUOUS
Status: DISPENSED | OUTPATIENT
Start: 2020-09-14 | End: 2020-09-14

## 2020-09-14 RX ADMIN — DEXAMETHASONE SODIUM PHOSPHATE 10 MG: 10 INJECTION INTRAMUSCULAR; INTRAVENOUS at 12:20

## 2020-09-14 RX ADMIN — DOCETAXEL ANHYDROUS 121 MG: 10 INJECTION, SOLUTION INTRAVENOUS at 14:07

## 2020-09-14 RX ADMIN — PEGFILGRASTIM 6 MG: KIT SUBCUTANEOUS at 15:57

## 2020-09-14 RX ADMIN — PALONOSETRON 0.25 MG: 0.05 INJECTION, SOLUTION INTRAVENOUS at 12:18

## 2020-09-14 RX ADMIN — SODIUM CHLORIDE 25 ML/HR: 900 INJECTION, SOLUTION INTRAVENOUS at 11:35

## 2020-09-14 RX ADMIN — TRASTUZUMAB 344 MG: 150 INJECTION, POWDER, LYOPHILIZED, FOR SOLUTION INTRAVENOUS at 11:42

## 2020-09-14 RX ADMIN — INFLUENZA VIRUS VACCINE 0.5 ML: 15; 15; 15; 15 SUSPENSION INTRAMUSCULAR at 15:26

## 2020-09-14 RX ADMIN — Medication 500 UNITS: at 15:57

## 2020-09-14 RX ADMIN — CARBOPLATIN 737 MG: 10 INJECTION INTRAVENOUS at 15:22

## 2020-09-14 RX ADMIN — PERTUZUMAB 420 MG: 30 INJECTION, SOLUTION, CONCENTRATE INTRAVENOUS at 12:56

## 2020-09-14 RX ADMIN — FOSAPREPITANT 150 MG: 150 INJECTION, POWDER, LYOPHILIZED, FOR SOLUTION INTRAVENOUS at 12:28

## 2020-09-14 NOTE — PROGRESS NOTES
Arabella Bustos is a 38 y.o. female Follow up for the Evaluation of Breast Cancer. 1. Have you been to the ER, urgent care clinic since your last visit? Hospitalized since your last visit? No    2. Have you seen or consulted any other health care providers outside of the [address] [place] [address] since your last visit? Include any pap smears or colon screening.  No

## 2020-09-14 NOTE — PROGRESS NOTES
[facility] [place] [facility]  [address], [address] [facility]  W: [phone]  F: [phone]      Reason for Visit:   Porter Swan is a 38 y.o. female who is seen in consultation at the request of Dr. Lita Parish for evaluation of therapy for breast cancer    Plastic surg:  Dr. Irina Ibrahim onc:  Dr. Zachary Farfan    Treatment History:   · 20 left breast ax core bx: Adenocarcinoma, ER + at 86% ; MO + at 69%; HER 2 POSITIVE (IHC 2+, FISH ratio 3.2; sig/cell 6.08), ki67 29%  · 20 L breast core bx: Atypical 1 mm focus, highly suspicious for [facility], lobular features, the tumor does not histologically match the patient's prior axillary cancer, but could represent a small sample of the same tumor  · 20 L breast excisional bx: Subareolar lumpectomy, IDC, 1.6 cm, invasive tumor present at anterior and lateral margins, gr 2, + LVI, 1/1 LN involved, 1.1 cm, ER + at 48%, MO + at 72%, HER 2 POSITIVE (IHC 2+, FISH ratio 2, sig/cell 4.14)  · 20 Kathie Dietz genetic testing:  negative  · TCHP 2020-2020    History of Present Illness:   She noticed a L axilla mass in 2020, leading to the pathology above. Interval history:  In today for follow up and treatment. Complains of gr 1 nausea, gr 1 hot flashes, gr 1 tachycardia, gr 1 itching, gr 1 neuropathy.     FH:  No breast, ovarian, prostate, or pancreas cancer    Past Medical History:   Diagnosis Date    Anxiety     Cancer [facility])     Essential hypertension     HX OTHER MEDICAL ,         Infertility     IVF with first pregnancy    Infertility, female     Joint pain     Psychiatric problem       Past Surgical History:   Procedure Laterality Date    HX OTHER SURGICAL      Hines Teeth Removal    HX OTHER SURGICAL      2017 Excision of mole on toe with skin graph      Social History     Tobacco Use    Smoking status: Never Smoker    Smokeless tobacco: Never Used   Substance Use Topics    Alcohol use: Yes     Frequency: 4 or more times a week      Family History   Problem Relation Age of Onset    Diabetes Mother     Heart Disease Mother     Hypertension Mother     Hypertension Father     Cancer Paternal Grandmother         Lung cancer    Stroke Paternal Grandmother     Cancer Paternal Grandfather         Melanoma     Current Outpatient Medications   Medication Sig    carvediloL (COREG) 3.125 mg tablet Take 1 Tab by mouth two (2) times daily (with meals).  magic mouthwash solution Magic mouth wash   Maalox  Lidocaine 2% viscous   Diphenhydramine oral solution     Pharmacy to mix equal portions of ingredients to a total volume as indicated in the dispense amount. 10-15 ml swish/spit and may swallow for throat pain every 4 hours PRN.  doxycycline (VIBRAMYCIN) 100 mg capsule TAKE 1 CAPSULE BY MOUTH TWICE A DAY *INS LIMITS 30 DAY    prochlorperazine (COMPAZINE) 10 mg tablet Take 1 Tab by mouth every six (6) hours as needed for Nausea.  dexAMETHasone (DECADRON) 4 mg tablet 8 mg bid the day before and day after chemo    lidocaine-prilocaine (EMLA) topical cream Apply  to affected area as needed for Pain.  ondansetron (ZOFRAN ODT) 8 mg disintegrating tablet Take 1 Tab by mouth every eight (8) hours as needed for Nausea or Vomiting. For 2 days following chemo    ibuprofen (MOTRIN) 600 mg tablet Take 1 Tab by mouth every six (6) hours as needed for Pain.  sertraline (ZOLOFT) 25 mg tablet Take 25 mg by mouth daily.  PNV No.22-Iron Cbn&Gluc-FA-DOS 27-1-50 mg Tab Take  by mouth. Indications: PREGNANCY     No current facility-administered medications for this visit.       Facility-Administered Medications Ordered in Other Visits   Medication Dose Route Frequency    influenza vaccine 2019-20 (6 mos+)(PF) (FLUARIX/FLULAVAL/FLUZONE QUAD) injection 0.5 mL  0.5 mL IntraMUSCular PRIOR TO DISCHARGE      No Known Allergies     Review of Systems: A complete review of systems was obtained, negative except as described above. Physical Exam:     Visit Vitals  /85 (BP 1 Location: Left arm, BP Patient Position: Sitting)   Pulse 76   Temp 98.2 °F (36.8 °C) (Temporal)   Resp 16   Ht 5' 4\" (1.626 m)   Wt 121 lb (54.9 kg)   SpO2 98%   BMI 20.77 kg/m²     ECOG PS: 0    Constitutional: Appears well-developed and well-nourished in no apparent distress      Mental status: Alert and awake, Oriented to person/place/time, Able to follow commands    Eyes: EOM normal, Sclera normal, No visible ocular discharge    HENT: Normocephalic, atraumatic    Mouth/Throat: Moist mucous membranes    External Ears normal    Neck: No visualized mass    Pulmonary/Chest: Respiratory effort normal, No visualized signs of difficulty breathing or respiratory distress    Musculoskeletal: Normal gait with no signs of ataxia, Normal range of motion of neck    Neurological: No facial asymmetry (Cranial nerve 7 motor function), No gaze palsy    Skin: No significant exanthematous lesions or discoloration noted on facial skin    Psychiatric: Normal affect, No hallucinations     Breasts:  1 cm L ax LN in L ax tail        Results:     Lab Results   Component Value Date/Time    WBC 5.1 09/14/2020 09:32 AM    HGB 10.3 (L) 09/14/2020 09:32 AM    HCT 29.3 (L) 09/14/2020 09:32 AM    PLATELET 496 (L) 97/74/1830 09:32 AM    .3 (H) 09/14/2020 09:32 AM    ABS. NEUTROPHILS 4.2 09/14/2020 09:32 AM     Lab Results   Component Value Date/Time    Sodium 139 09/14/2020 09:32 AM    Potassium 3.8 09/14/2020 09:32 AM    Chloride 106 09/14/2020 09:32 AM    CO2 25 09/14/2020 09:32 AM    Glucose 140 (H) 09/14/2020 09:32 AM    BUN 8 09/14/2020 09:32 AM    Creatinine 0.66 09/14/2020 09:32 AM    GFR est AA >60 09/14/2020 09:32 AM    GFR est non-AA >60 09/14/2020 09:32 AM    Calcium 9.0 09/14/2020 09:32 AM     Lab Results   Component Value Date/Time    Bilirubin, total 0.5 09/14/2020 09:32 AM    ALT (SGPT) 59 09/14/2020 09:32 AM    Alk.  phosphatase 66 09/14/2020 09:32 AM    Protein, total 7.1 09/14/2020 09:32 AM    Albumin 4.1 09/14/2020 09:32 AM    Globulin 3.0 09/14/2020 09:32 AM     5/20/20 breast  MRI  Subareolar region of L breast 1.2 cm mass, at least 2 LN on left, 3 cm largest  Right breast negative    5/20/20 bone scan  Heterogeneous activity in the ribs of uncertain significance. This would be an unusual presentation of bony metastatic disease    5/20/20 CT c/a/p  negative    Records reviewed and summarized above. Pathology report(s) reviewed above. Radiology report(s) reviewed above. Assessment/plan:   1.  L breast cancer, LN + by core, ER +, AK +, HER 2 POSITIVE, cT1c cN1a cM0:  Stage IIA, prognostic stage IB    We explained to the patient that the goal of systemic adjuvant therapy is to improve the chances for cure and decrease the risk of relapse. We explained why a patient can have microscopic cancer spread now even though physical examination, laboratory studies and imaging studies are negative for cancer. We explained that the same treatments used now as adjuvant or preventive treatments rarely if ever are curative in women who develop metastases. We discussed that there is no difference in overall survival between neoadjuvant and adjuvant chemotherapy. We discussed the rationale for neoadjuvant chemotherapy, if chemotherapy is warranted, as it is in this case: to avoid any potential delays in giving chemotherapy following surgery, to be able to see the response of the tumor to chemotherapy, and to potentially downstage the tumor prior to surgery. Methow Ezequiel suggests equivalency between q.3 week Adriamycin, Cytoxan followed by weekly paclitaxel and trastuzumab compared with the [facility] regimen. However, this study was not powered to show a difference between these two regimens, and in the [facility] publication, the AC-TH arm showed a numerically advantange (though not statistically significant) to the [facility] arm.   In this patient, it is completely reasonable to use [facility] approach and avoid the potential cardiotoxicity of the anthracyclines as well as the potential for leukemia. We discussed the toxicities of docetaxel and carboplatin chemotherapy in detail. This chemotherapy frequently causes a low white blood cell count and hospital admissions for treatment of neutropenic fever. We explained that we consider the use of growth factors to minimize this risk. We explained to the patient that some side effects if they occur only last a few days including nausea, vomiting, stomatitis, arthralgia, myalgia,and allergic reactions to Taxotere. We told the patient that severe nausea and vomiting were uncommon and that some side effects,if they occur, will last longer; this includes hair loss, which will be seen in all patients treated with these agents and fatigue,which will be seen in most.  We also informed that for the patient that heart damage is rare with these agents. We explained that carboplatin can rarely cause kidney damage and high frequency hearing loss. We provided the patient in detail her information concerning the toxicities of this regimen in addition to her overall discussion. Rationale for therapy with trastuzumab was also discussed with the patient including a 50% proportional improvement in disease free survival and also an improvement in overall survival in patients receiving trastuzumab and chemotherapy for HER-2 positive breast cancer. The side effects of trastuzumab were discussed including a 4%-5% risk of dropping her ejection fraction while on treatment and about a 1% risk of CHF. We discussed that this drug will be used every 3 weeks for remainder of a year following the chemotherapy cycles. We will check her EF before chemotherapy and every 3 months while she is receiving trastuzumab.     Rational for therapy with pertuzumab was also discussed with the patient including a nearly 20% improvement seen in pCR in the neoadjuvant setting with the addition of this medication. Additional AE discussed including an acne rash (and the use of doxycyline 100 mg bid to help prevent) and diarrhea and consideration of additional cardiomyopathy. · TCH-P (Trastuzumab 8mg/kg load with cycle 1 then 6mg/kg, Docetaxel 75mg/m2, Carboplatin AUC 6, Pertuzumab 840mg load with cycle 1 then 420mg) given every 3 weeks x 6 cycles  · Labs: CBC, CMP prior to each treatment. · Antiemetic Prophylaxis: Palonosetron and dexamethasone prior to chemo  · PRN Antiemetics: Ondansetron, Compazine  · Swelling prophylaxis: Dexmethasone 8mg bid the day before and day after chemotherapy  · TTE prior to chemotherapy and every 12 weeks while on Trastuzumab  · Neulasta 24-72 hours after each treatment    Cold caps discussed. She is interested and got fitted and will use    Oral and peripheral cryotherapy discussed. Peripheral neuropathy trial discussed and research met with her (essential trial for her) and she has consented. Screened by me for compass HER 2 de-escalation study, but her primary tumor is not > 1.5 cm    Discussed outback HP if pCR, if no pCR, discussed T-DM1     After this discussion, she is agreeable to TCH-P as above, she has signed informed consent. TTE on 5/28/2020 EF 64%, TTE on 8/10/2020, EF 60%, TTE on 9/11/2020, EF 61%, overall stable. Port placed by Dr. Naif Prince on 5/27/20     Breast mass biopsied and clipped on 5/21/20, also had surgical biopsy on 5/27/2020. She had follow up with Dr. Neida Taylor on 6/3/2020. The patient was given presciptions for a wig, emla cream, decadron to take 8 mg bid the day before and day after chemo, zofran and compazine. Neulasta the following day. Will proceed with TCHP #6 today. IUD was removed by gyn. Surgery scheduled for 11/12/2020. Has follow up with Dr. Neida Taylor on 9/25/2020.     2. Emotional well being:  She has excellent support and is coping well with her disease    3.  Genetic testing:  discussed potential ramifications of a positive test including the potential need for bilateral mastectomies and bilateral oophorectomies and the risk then for her family members to also have a mutation. VUS also discussed. Tested in Dr. Giacomo Vo office on 5/21/20, negative    4. Loss of fertility:  Discussed potential loss of menses and fertility. She is not interested in having further children. Declines oncofertility consult    5. Bone pain: Due to neulasta. Recommend claritin daily. Mild improvement tylenol PRN. Recommend Ibuprofen BID for 3 days along with tylenol PRN. 6. Diarrhea: Due to chemo. Managed by Imodium PRN and increased hydration. 7. Throat pain:  Magic mouthwash PRN, rx in.     8. 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 9. Blood on toilet paper:  Denies blood in stool, or large amounts of blood. Most likely due to diarrhea/possible hemorrhoids. None with past treatment. 10.  Decline in LV strain: 7.8% decline since initial TTE on 5/28/2020, started on Coreg 3.125 mg BID. Repeat TTE on 9/10/2020, EF 61%, stable. 11. Thrombocytopenia:  Due to chemo. Will monitor. Plts 100 today, ok to treat. 12. Anemia:  Due to chemo, will monitor    I appreciate the opportunity to participate in Ms. Royce Mulligan's care. Signed By: Eddie De La Fuente MD      No orders of the defined types were placed in this encounter.

## 2020-09-14 NOTE — PROGRESS NOTES
[facility] Progress Note    Date: 2020    Name: Cinthya Allison    MRN: [id number]         : 1980    Ms. Zeina Carmichael Arrived ambulatory and in no distress for C6D1 of TCHP Regimen. Assessment was completed, no acute issues at this time, no new complaints voiced. R chest wall port accessed without difficulty, labs drawn & sent for processing. Chemotherapy Flowsheet 2020   Cycle C6D1   Date 2020   Drug / Regimen TCHP   Pre Meds given   Notes given+OBI+flu        Patient proceed to appointment with Dr. Nanci Baird. Ms. Mulligan's vitals were reviewed. Visit Vitals  /85   Pulse 76   Temp 98.2 °F (36.8 °C)   Resp 16   Ht 5' 4\" (1.626 m)   Wt 55.2 kg (121 lb 11.2 oz)   SpO2 98%   Breastfeeding No   BMI 20.89 kg/m²       Lab results were obtained and reviewed. Recent Results (from the past 12 hour(s))   CBC WITH 3 PART DIFF    Collection Time: 20  9:32 AM   Result Value Ref Range    WBC 5.1 3.6 - 11.0 K/uL    RBC 2.92 (L) 3.80 - 5.20 M/uL    HGB 10.3 (L) 11.5 - 16.0 g/dL    HCT 29.3 (L) 35.0 - 47.0 %    .3 (H) 80.0 - 99.0 FL    MCH 35.3 (H) 26.0 - 34.0 PG    MCHC 35.2 30.0 - 36.5 g/dL    RDW 16.1 (H) 11.8 - 15.8 %    PLATELET 042 (L) 424 - 400 K/uL    NEUTROPHILS 81 (H) 32 - 75 %    MIXED CELLS 6 3.2 - 16.9 %    LYMPHOCYTES 13 12 - 49 %    ABS. NEUTROPHILS 4.2 1.8 - 8.0 K/UL    ABS. MIXED CELLS 0.3 0.2 - 1.2 K/uL    ABS.  LYMPHOCYTES 0.6 (L) 0.8 - 3.5 K/UL    DF AUTOMATED     METABOLIC PANEL, COMPREHENSIVE    Collection Time: 20  9:32 AM   Result Value Ref Range    Sodium 139 136 - 145 mmol/L    Potassium 3.8 3.5 - 5.1 mmol/L    Chloride 106 97 - 108 mmol/L    CO2 25 21 - 32 mmol/L    Anion gap 8 5 - 15 mmol/L    Glucose 140 (H) 65 - 100 mg/dL    BUN 8 6 - 20 MG/DL    Creatinine 0.66 0.55 - 1.02 MG/DL    BUN/Creatinine ratio 12 12 - 20      GFR est AA >60 >60 ml/min/1.73m2    GFR est non-AA >60 >60 ml/min/1.73m2    Calcium 9.0 8.5 - 10.1 MG/DL    Bilirubin, total 0.5 0.2 - 1.0 MG/DL    ALT (SGPT) 59 12 - 78 U/L    AST (SGOT) 36 15 - 37 U/L    Alk.  phosphatase 66 45 - 117 U/L    Protein, total 7.1 6.4 - 8.2 g/dL    Albumin 4.1 3.5 - 5.0 g/dL    Globulin 3.0 2.0 - 4.0 g/dL    A-G Ratio 1.4 1.1 - 2.2         Medications:  Medications Administered     0.9% sodium chloride infusion     Admin Date  09/14/2020 Action  New Bag Dose  25 mL/hr Rate  25 mL/hr Route  IntraVENous Administered By  Ary Bolaños RN          CARBOplatin (PARAPLATIN) 737 mg in 0.9% sodium chloride 250 mL, overfill volume 25 mL chemo infusion     Admin Date  09/14/2020 Action  New Bag Dose  737 mg Rate  697.4 mL/hr Route  IntraVENous Administered By  Ary Bolaños RN          dexamethasone (DECADRON) 10 mg/mL injection 10 mg     Admin Date  09/14/2020 Action  Given Dose  10 mg Route  IntraVENous Administered By  Ary Bolaños RN          DOCEtaxeL (TAXOTERE) 121 mg in 0.9% sodium chloride 250 mL, overfill volume 25 mL chemo infusion     Admin Date  09/14/2020 Action  New Bag Dose  121 mg Route  IntraVENous Administered By  Ary Bolaños RN          fosaprepitant (EMEND) 150 mg in 0.9% sodium chloride 150 mL IVPB     Admin Date  09/14/2020 Action  Given Dose  150 mg Rate  450 mL/hr Route  IntraVENous Administered By  Ary Bolaños RN          heparin (porcine) pf 300-500 Units     Admin Date  09/14/2020 Action  Given Dose  500 Units Route  InterCATHeter Administered By  Ary Bolaños RN          influenza vaccine 2020-21 (6 mos+)(PF) (FLUARIX/FLULAVAL/FLUZONE QUAD) injection 0.5 mL     Admin Date  09/14/2020 Action  Given Dose  0.5 mL Route  IntraMUSCular Administered By  Ary Bolaños RN          palonosetron HCl (ALOXI) injection 0.25 mg     Admin Date  09/14/2020 Action  Given Dose  0.25 mg Route  IntraVENous Administered By  Ary Bolaños RN          pegfilgrastim (NEULASTA) wearable SQ injector 6 mg     Admin Date  09/14/2020 Action  Given Dose  6 mg Route  SubCUTAneous Administered By  Omega Navarrete RN          pertuzumab (PERJETA) 420 mg in 0.9% sodium chloride 250 mL, overfill volume 25 mL IVPB     Admin Date  09/14/2020 Action  New Bag Dose  420 mg Rate  578 mL/hr Route  IntraVENous Administered By  Omega Navarrete RN          trastuzumab (HERCEPTIN) 344 mg in 0.9% sodium chloride 250 mL, overfill volume 25 mL IVPB     Admin Date  09/14/2020 Action  New Bag Dose  344 mg Rate  582.8 mL/hr Route  IntraVENous Administered By  Omega Navarrete RN                  Ms. Mohamud Zuñiga tolerated treatment well and was discharged from [place] [address] in stable condition. Port de-accessed, flushed & heparinized per protocol. She is to return on October 5 for her next appointment.     Kalina Fontenot RN  September 14, 2020

## 2020-09-18 ENCOUNTER — TELEPHONE (OUTPATIENT)
Dept: ONCOLOGY | Age: 40
End: 2020-09-18

## 2020-09-18 NOTE — TELEPHONE ENCOUNTER
----- Message from Melissa Randall NP sent at 9/17/2020  3:14 PM EDT -----  She is due to see us back around 11/23. Please cancel all [facility] appointments for now. She is getting surgery and will come back after surgery to discuss next treatment steps. Thanks!

## 2020-09-18 NOTE — TELEPHONE ENCOUNTER
Spoke with patient about her 10 week appt. She just wanted to souble check that she didn't have any treatment in 3 week.   She just wants to double check    Please advises  [phone]

## 2020-10-05 ENCOUNTER — APPOINTMENT (OUTPATIENT)
Dept: INFUSION THERAPY | Age: 40
End: 2020-10-05

## 2020-10-26 ENCOUNTER — APPOINTMENT (OUTPATIENT)
Dept: INFUSION THERAPY | Age: 40
End: 2020-10-26

## 2020-11-05 DIAGNOSIS — C50.112 MALIGNANT NEOPLASM OF CENTRAL PORTION OF LEFT BREAST IN FEMALE, ESTROGEN RECEPTOR POSITIVE (HCC): ICD-10-CM

## 2020-11-05 DIAGNOSIS — Z17.0 MALIGNANT NEOPLASM OF CENTRAL PORTION OF LEFT BREAST IN FEMALE, ESTROGEN RECEPTOR POSITIVE (HCC): ICD-10-CM

## 2020-11-09 RX ORDER — CARVEDILOL 3.12 MG/1
TABLET ORAL
Qty: 180 TAB | Refills: 1 | Status: SHIPPED | OUTPATIENT
Start: 2020-11-09 | End: 2020-12-08

## 2020-11-16 ENCOUNTER — APPOINTMENT (OUTPATIENT)
Dept: INFUSION THERAPY | Age: 40
End: 2020-11-16

## 2020-11-17 ENCOUNTER — DOCUMENTATION ONLY (OUTPATIENT)
Dept: ONCOLOGY | Age: 40
End: 2020-11-17

## 2020-11-17 NOTE — PROGRESS NOTES
Request sent over to Chas Silver's office yesterday 11/16 requesting the Surgical Pathology on patient since patient has an Appointment with us on 11/24 and we do not have those results yet--Fax confirmation received for that.        11/17/2020 @ 7:51am--Request sent again over to Dr.Ruth Silver's office requesting the Surgical Pathology--Fax confirmation also received the second time.

## 2020-11-24 ENCOUNTER — OFFICE VISIT (OUTPATIENT)
Dept: ONCOLOGY | Age: 40
End: 2020-11-24
Payer: COMMERCIAL

## 2020-11-24 ENCOUNTER — RESEARCH ENCOUNTER (OUTPATIENT)
Dept: ONCOLOGY | Age: 40
End: 2020-11-24

## 2020-11-24 VITALS
WEIGHT: 126.8 LBS | RESPIRATION RATE: 18 BRPM | HEART RATE: 71 BPM | HEIGHT: 64 IN | TEMPERATURE: 98.9 F | OXYGEN SATURATION: 98 % | DIASTOLIC BLOOD PRESSURE: 83 MMHG | SYSTOLIC BLOOD PRESSURE: 132 MMHG | BODY MASS INDEX: 21.65 KG/M2

## 2020-11-24 DIAGNOSIS — C50.112 MALIGNANT NEOPLASM OF CENTRAL PORTION OF LEFT BREAST IN FEMALE, ESTROGEN RECEPTOR POSITIVE (HCC): Primary | ICD-10-CM

## 2020-11-24 DIAGNOSIS — Z79.899 ENCOUNTER FOR MONITORING CARDIOTOXIC DRUG THERAPY: ICD-10-CM

## 2020-11-24 DIAGNOSIS — Z51.81 ENCOUNTER FOR MONITORING CARDIOTOXIC DRUG THERAPY: ICD-10-CM

## 2020-11-24 DIAGNOSIS — Z17.0 MALIGNANT NEOPLASM OF CENTRAL PORTION OF LEFT BREAST IN FEMALE, ESTROGEN RECEPTOR POSITIVE (HCC): Primary | ICD-10-CM

## 2020-11-24 PROCEDURE — 99214 OFFICE O/P EST MOD 30 MIN: CPT | Performed by: INTERNAL MEDICINE

## 2020-11-24 NOTE — PROGRESS NOTES
[facility] [place] [address]  [address] [facility], [address] [facility] 100[address] [facility]  W: [phone]  F: [phone]      Reason for Visit:   Leidy Cota is a 38 y.o. female who is seen in consultation at the request of Dr. Opal Acuña for evaluation of therapy for breast cancer    Plastic surg:  Dr. Viviana Duran onc:  Dr. HWANG [facility]    Treatment History:   · 20 left breast ax core bx: Adenocarcinoma, ER + at 86% ; HI + at 69%; HER 2 POSITIVE (IHC 2+, FISH ratio 3.2; sig/cell 6.08), ki67 29%  · 20 L breast core bx: Atypical 1 mm focus, highly suspicious for [facility], lobular features, the tumor does not histologically match the patient's prior axillary cancer, but could represent a small sample of the same tumor  · 20 L breast excisional bx: Subareolar lumpectomy, IDC, 1.6 cm, invasive tumor present at anterior and lateral margins, gr 2, + LVI, 1/1 LN involved, 1.1 cm, ER + at 48%, HI + at 72%, HER 2 POSITIVE (IHC 2+, FISH ratio 2, sig/cell 4.14)  · 20 [facility] genetic testing:  negative  · [facility] 2020-2020  · 2020 Bilateral mastectomy: Right breast: benign. Left breast: Inflammation, no residual carcinoma, 2/6 LNs (micromets in both, 1.7 mm, and 1.1 mm). pT1c ypN1mi cM0    History of Present Illness:   She noticed a L axilla mass in 2020, leading to the pathology above. Interval history:  In today for follow up and treatment. Complains of gr 2 hot flashes, gr 2 pain, 3/10 pain to left arm at surgical site, gr 2 itching, gr 1 neuropathy. Complains of L eye irritation    FH:   No breast, ovarian, prostate, or pancreas cancer    Past Medical History:   Diagnosis Date    Anxiety     Cancer [facility])     Essential hypertension     HX OTHER MEDICAL ,         Infertility     IVF with first pregnancy    Infertility, female     Joint pain     Psychiatric problem       Past Surgical History:   Procedure Laterality Date    HX BILATERAL MASTECTOMY    HX OTHER SURGICAL      [place] Teeth Removal    HX OTHER SURGICAL      1/2017 Excision of mole on toe with skin graph      Social History     Tobacco Use    Smoking status: Never Smoker    Smokeless tobacco: Never Used   Substance Use Topics    Alcohol use: Yes     Frequency: 4 or more times a week      Family History   Problem Relation Age of Onset    Diabetes Mother     Heart Disease Mother     Hypertension Mother     Hypertension Father     Cancer Paternal Grandmother         Lung cancer    Stroke Paternal Grandmother     Cancer Paternal Grandfather         Melanoma     Current Outpatient Medications   Medication Sig    celecoxib (CELEBREX PO) Take  by mouth two (2) times a day.  acetaminophen (TYLENOL PO) Take  by mouth.  carvediloL (COREG) 3.125 mg tablet TAKE 1 TABLET BY MOUTH TWICE A DAY WITH MEALS    lidocaine-prilocaine (EMLA) topical cream Apply  to affected area as needed for Pain.  sertraline (ZOLOFT) 25 mg tablet Take 25 mg by mouth daily.  magic mouthwash solution Magic mouth wash   Maalox  Lidocaine 2% viscous   Diphenhydramine oral solution     Pharmacy to mix equal portions of ingredients to a total volume as indicated in the dispense amount. 10-15 ml swish/spit and may swallow for throat pain every 4 hours PRN.  doxycycline (VIBRAMYCIN) 100 mg capsule TAKE 1 CAPSULE BY MOUTH TWICE A DAY *INS LIMITS 30 DAY    prochlorperazine (COMPAZINE) 10 mg tablet Take 1 Tab by mouth every six (6) hours as needed for Nausea.  dexAMETHasone (DECADRON) 4 mg tablet 8 mg bid the day before and day after chemo    ondansetron (ZOFRAN ODT) 8 mg disintegrating tablet Take 1 Tab by mouth every eight (8) hours as needed for Nausea or Vomiting. For 2 days following chemo    ibuprofen (MOTRIN) 600 mg tablet Take 1 Tab by mouth every six (6) hours as needed for Pain.  PNV No.22-Iron Cbn&Gluc-FA-DOS 27-1-50 mg Tab Take  by mouth.     Indications: PREGNANCY     No current facility-administered medications for this visit. No Known Allergies     Review of Systems: A complete review of systems was obtained, negative except as described above. Physical Exam:     Visit Vitals  /83 (BP 1 Location: Right arm, BP Patient Position: Sitting)   Pulse 71   Temp 98.9 °F (37.2 °C) (Oral)   Resp 18   Ht 5' 4\" (1.626 m)   Wt 126 lb 12.8 oz (57.5 kg)   SpO2 98%   BMI 21.77 kg/m²     ECOG PS: 0    Constitutional: Appears well-developed and well-nourished in no apparent distress      Mental status: Alert and awake, Oriented to person/place/time, Able to follow commands    Eyes: EOM normal, Sclera normal, No visible ocular discharge    HENT: Normocephalic, atraumatic    Mouth/Throat: Moist mucous membranes    External Ears normal    Neck: No visualized mass    Pulmonary/Chest: Respiratory effort normal, No visualized signs of difficulty breathing or respiratory distress    Musculoskeletal: Normal gait with no signs of ataxia, Normal range of motion of neck    Neurological: No facial asymmetry (Cranial nerve 7 motor function), No gaze palsy    Skin: No significant exanthematous lesions or discoloration noted on facial skin    Psychiatric: Normal affect, No hallucinations         Results:     Lab Results   Component Value Date/Time    WBC 5.1 09/14/2020 09:32 AM    HGB 10.3 (L) 09/14/2020 09:32 AM    HCT 29.3 (L) 09/14/2020 09:32 AM    PLATELET 061 (L) 63/52/1079 09:32 AM    .3 (H) 09/14/2020 09:32 AM    ABS.  NEUTROPHILS 4.2 09/14/2020 09:32 AM     Lab Results   Component Value Date/Time    Sodium 139 09/14/2020 09:32 AM    Potassium 3.8 09/14/2020 09:32 AM    Chloride 106 09/14/2020 09:32 AM    CO2 25 09/14/2020 09:32 AM    Glucose 140 (H) 09/14/2020 09:32 AM    BUN 8 09/14/2020 09:32 AM    Creatinine 0.66 09/14/2020 09:32 AM    GFR est AA >60 09/14/2020 09:32 AM    GFR est non-AA >60 09/14/2020 09:32 AM    Calcium 9.0 09/14/2020 09:32 AM     Lab Results   Component Value Date/Time Bilirubin, total 0.5 09/14/2020 09:32 AM    ALT (SGPT) 59 09/14/2020 09:32 AM    Alk. phosphatase 66 09/14/2020 09:32 AM    Protein, total 7.1 09/14/2020 09:32 AM    Albumin 4.1 09/14/2020 09:32 AM    Globulin 3.0 09/14/2020 09:32 AM     5/20/20 breast  MRI  Subareolar region of L breast 1.2 cm mass, at least 2 LN on left, 3 cm largest  Right breast negative    5/20/20 bone scan  Heterogeneous activity in the ribs of uncertain significance. This would be an unusual presentation of bony metastatic disease    5/20/20 CT c/a/p  negative    Records reviewed and summarized above. Pathology report(s) reviewed above. Radiology report(s) reviewed above. Assessment/plan:   1.  L breast cancer, LN + by core, ER +, WI +, HER 2 POSITIVE, cT1c cN1a cM0:  Stage IIA, prognostic stage IB    We explained to the patient that the goal of systemic adjuvant therapy is to improve the chances for cure and decrease the risk of relapse. We explained why a patient can have microscopic cancer spread now even though physical examination, laboratory studies and imaging studies are negative for cancer. We explained that the same treatments used now as adjuvant or preventive treatments rarely if ever are curative in women who develop metastases. TTE on 5/28/2020 EF 64%, TTE on 8/10/2020, EF 60%, TTE on 9/11/2020, EF 61%, overall stable, next due 12/3/2020, ordered. IUD was removed by gyn. Bilateral mastectomies on 11/12/2020, no residual carcinoma in breast, 2/6 LNs (micromets). She had her last drain removed on 11/23/2020. Believes she has some fluid that has accumulated, has follow up with Dr. Bethany Milan today. She has seen Dr. Sienna Up for Yamilet . Plan for CT sim on 12/2/2020, however this may be delayed due to limiting ROM. She has been referred to lymphedema at [facility].      Discussed that with residual disease, will change from trastuzumab and pertuzumab to T-DM1 3.6 mg/kg IV q 3 weeks x 14 doses.  Side effects of T-DM1 include hepatotoxicity, pulmonary tox, thrombocytopenia, infusion reaction (rare), cardiotoxicity.  She is agreeable and has signed informed consent. 2. Emotional well being:  She has excellent support and is coping well with her disease    3. Genetic testing:  discussed potential ramifications of a positive test including the potential need for bilateral mastectomies and bilateral oophorectomies and the risk then for her family members to also have a mutation. VUS also discussed. Tested in Dr. Chhaya Granados office on 5/21/20, negative    4. Loss of fertility:  Discussed potential loss of menses and fertility. She is not interested in having further children. Declines oncofertility consult    5. 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 6.  Decline in LV strain: 7.8% decline since initial TTE on 5/28/2020, started on Coreg 3.125 mg BID. Repeat TTE on 9/10/2020, EF 61%, stable, next due 12/3/2020, ordered. 7. Thrombocytopenia:  Due to chemo. Will monitor. 8. Anemia:  Due to chemo, will monitor    9. L eye irritation:  Will refer to see ophtho; OTC eye drops    This patient was seen in conjunction with Tobi Blake NP. I personally reviewed the history and all points in the assessment and plan with the patient, and performed key points on the exam.       I appreciate the opportunity to participate in Ms. Daren Mulligan's care. Signed By: John Huizar MD      No orders of the defined types were placed in this encounter.

## 2020-11-24 NOTE — PROGRESS NOTES
Identified pt with two pt identifiers(name and ). Reviewed record in preparation for visit and have obtained necessary documentation. Chief Complaint   Patient presents with    Breast Cancer     Malignant neoplasm of central portion of left breast in female, estrogen receptor positive    Blurred Vision     pt states, right eye is slightly blurry. Occurred after last chemo. Not constant. eye pressure      Vitals:    20 1445   BP: 132/83   Pulse: 71   Resp: 18   Temp: 98.9 °F (37.2 °C)   TempSrc: Oral   SpO2: 98%   Weight: 126 lb 12.8 oz (57.5 kg)   Height: 5' 4\" (1.626 m)   PainSc:   0 - No pain       Health Maintenance Review: Patient reminded of \"due or due soon\" health maintenance. I have asked the patient to contact his/her primary care provider (PCP) for follow-up on his/her health maintenance. Coordination of Care Questionnaire:  :   1) Have you been to an emergency room, urgent care, or hospitalized since your last visit? If yes, where when, and reason for visit? no       2. Have seen or consulted any other health care provider since your last visit? If yes, where when, and reason for visit? NO    Advance Care Planning:   End of Life Planning: DNI/DNR, has NO advanced directive  - add't info provided, reviewed DNR/DNI and patient is not interested  Ramona Townsend 127 ACP-Facilitator appointment no    Patient is accompanied by self I have received verbal consent from Richard Lowe to discuss any/all medical information while they are present in the room.

## 2020-11-24 NOTE — PROGRESS NOTES
Pt in for office visit with Dr. Mere Finch. Today is week 24. QOLs and assessments completed per protocol. Labs will be completed on 12/8/20 when she comes for her first TDM-1 treatment. Next appt is week 52.

## 2020-12-02 ENCOUNTER — HOSPITAL ENCOUNTER (OUTPATIENT)
Dept: NON INVASIVE DIAGNOSTICS | Age: 40
Discharge: HOME | End: 2020-12-02
Attending: NURSE PRACTITIONER
Payer: COMMERCIAL

## 2020-12-02 VITALS
HEIGHT: 64 IN | WEIGHT: 126 LBS | SYSTOLIC BLOOD PRESSURE: 130 MMHG | DIASTOLIC BLOOD PRESSURE: 96 MMHG | BODY MASS INDEX: 21.51 KG/M2

## 2020-12-02 DIAGNOSIS — Z17.0 MALIGNANT NEOPLASM OF CENTRAL PORTION OF LEFT BREAST IN FEMALE, ESTROGEN RECEPTOR POSITIVE (HCC): ICD-10-CM

## 2020-12-02 DIAGNOSIS — Z79.899 ENCOUNTER FOR MONITORING CARDIOTOXIC DRUG THERAPY: ICD-10-CM

## 2020-12-02 DIAGNOSIS — C50.112 MALIGNANT NEOPLASM OF CENTRAL PORTION OF LEFT BREAST IN FEMALE, ESTROGEN RECEPTOR POSITIVE (HCC): ICD-10-CM

## 2020-12-02 DIAGNOSIS — Z51.81 ENCOUNTER FOR MONITORING CARDIOTOXIC DRUG THERAPY: ICD-10-CM

## 2020-12-02 PROCEDURE — 93306 TTE W/DOPPLER COMPLETE: CPT

## 2020-12-03 LAB
ECHO AO ROOT DIAM: 2.76 CM
ECHO AV AREA PEAK VELOCITY: 2.91 CM2
ECHO AV AREA/BSA PEAK VELOCITY: 1.8 CM2/M2
ECHO AV PEAK GRADIENT: 4.22 MMHG
ECHO AV PEAK VELOCITY: 102.7 CM/S
ECHO EST RA PRESSURE: 3 MMHG
ECHO LA MAJOR AXIS: 2.74 CM
ECHO LA MINOR AXIS: 1.7 CM
ECHO LA VOL 2C: 50.91 ML (ref 22–52)
ECHO LA VOL 4C: 47.17 ML (ref 22–52)
ECHO LA VOL BP: 54.18 ML (ref 22–52)
ECHO LA VOL/BSA BIPLANE: 33.71 ML/M2 (ref 16–28)
ECHO LA VOLUME INDEX A2C: 31.67 ML/M2 (ref 16–28)
ECHO LA VOLUME INDEX A4C: 29.35 ML/M2 (ref 16–28)
ECHO LV GLOBAL LONGITUDINAL STRAIN (GLS): -18.9 PERCENT
ECHO LV INTERNAL DIMENSION DIASTOLIC: 4.55 CM (ref 3.9–5.3)
ECHO LV INTERNAL DIMENSION SYSTOLIC: 3.36 CM
ECHO LV IVSD: 0.98 CM (ref 0.6–0.9)
ECHO LV MASS 2D: 130.3 G (ref 67–162)
ECHO LV MASS INDEX 2D: 81 G/M2 (ref 43–95)
ECHO LV POSTERIOR WALL DIASTOLIC: 0.77 CM (ref 0.6–0.9)
ECHO LVOT DIAM: 1.82 CM
ECHO LVOT PEAK GRADIENT: 5.36 MMHG
ECHO LVOT PEAK VELOCITY: 115.72 CM/S
ECHO MV A VELOCITY: 65.58 CM/S
ECHO MV E DECELERATION TIME (DT): 176.71 MS
ECHO MV E VELOCITY: 96.72 CM/S
ECHO MV E/A RATIO: 1.47
ECHO PV PEAK INSTANTANEOUS GRADIENT SYSTOLIC: 1.35 MMHG
ECHO PV REGURGITANT MAX VELOCITY: 109.69 CM/S
ECHO RIGHT VENTRICULAR SYSTOLIC PRESSURE (RVSP): 33.09 MMHG
ECHO RV INTERNAL DIMENSION: 3 CM
ECHO TV REGURGITANT MAX VELOCITY: 274.28 CM/S
ECHO TV REGURGITANT PEAK GRADIENT: 30.09 MMHG
GLOBAL LONGITUDINAL STRAIN 2 CHAMBER: -19 PERCENT
GLOBAL LONGITUDINAL STRAIN 4 CHAMBER: -19.7 PERCENT
GLOBAL LONGITUDINAL STRAIN LONG AXIS: -18.1 PERCENT

## 2020-12-04 RX ORDER — ACETAMINOPHEN 325 MG/1
650 TABLET ORAL AS NEEDED
Status: CANCELLED
Start: 2020-12-08

## 2020-12-04 RX ORDER — DIPHENHYDRAMINE HYDROCHLORIDE 50 MG/ML
25 INJECTION, SOLUTION INTRAMUSCULAR; INTRAVENOUS AS NEEDED
Status: CANCELLED
Start: 2020-12-08

## 2020-12-04 RX ORDER — HYDROCORTISONE SODIUM SUCCINATE 100 MG/2ML
100 INJECTION, POWDER, FOR SOLUTION INTRAMUSCULAR; INTRAVENOUS AS NEEDED
Status: CANCELLED | OUTPATIENT
Start: 2020-12-08

## 2020-12-04 RX ORDER — ACETAMINOPHEN 325 MG/1
650 TABLET ORAL AS NEEDED
Status: CANCELLED
Start: 2021-01-19

## 2020-12-04 RX ORDER — SODIUM CHLORIDE 0.9 % (FLUSH) 0.9 %
10 SYRINGE (ML) INJECTION AS NEEDED
Status: CANCELLED | OUTPATIENT
Start: 2020-12-29

## 2020-12-04 RX ORDER — EPINEPHRINE 1 MG/ML
0.3 INJECTION, SOLUTION, CONCENTRATE INTRAVENOUS AS NEEDED
Status: CANCELLED | OUTPATIENT
Start: 2021-01-19

## 2020-12-04 RX ORDER — SODIUM CHLORIDE 0.9 % (FLUSH) 0.9 %
10 SYRINGE (ML) INJECTION AS NEEDED
Status: CANCELLED | OUTPATIENT
Start: 2021-01-19

## 2020-12-04 RX ORDER — SODIUM CHLORIDE 9 MG/ML
10 INJECTION INTRAMUSCULAR; INTRAVENOUS; SUBCUTANEOUS AS NEEDED
Status: CANCELLED | OUTPATIENT
Start: 2021-01-19

## 2020-12-04 RX ORDER — DIPHENHYDRAMINE HYDROCHLORIDE 50 MG/ML
25 INJECTION, SOLUTION INTRAMUSCULAR; INTRAVENOUS AS NEEDED
Status: CANCELLED
Start: 2021-01-19

## 2020-12-04 RX ORDER — EPINEPHRINE 1 MG/ML
0.3 INJECTION, SOLUTION, CONCENTRATE INTRAVENOUS AS NEEDED
Status: CANCELLED | OUTPATIENT
Start: 2020-12-29

## 2020-12-04 RX ORDER — ONDANSETRON 2 MG/ML
8 INJECTION INTRAMUSCULAR; INTRAVENOUS ONCE
Status: CANCELLED | OUTPATIENT
Start: 2020-12-29

## 2020-12-04 RX ORDER — DIPHENHYDRAMINE HYDROCHLORIDE 50 MG/ML
50 INJECTION, SOLUTION INTRAMUSCULAR; INTRAVENOUS AS NEEDED
Status: CANCELLED
Start: 2020-12-08

## 2020-12-04 RX ORDER — ONDANSETRON 2 MG/ML
8 INJECTION INTRAMUSCULAR; INTRAVENOUS AS NEEDED
Status: CANCELLED | OUTPATIENT
Start: 2020-12-29

## 2020-12-04 RX ORDER — HYDROCORTISONE SODIUM SUCCINATE 100 MG/2ML
100 INJECTION, POWDER, FOR SOLUTION INTRAMUSCULAR; INTRAVENOUS AS NEEDED
Status: CANCELLED | OUTPATIENT
Start: 2021-01-19

## 2020-12-04 RX ORDER — EPINEPHRINE 1 MG/ML
0.3 INJECTION, SOLUTION, CONCENTRATE INTRAVENOUS AS NEEDED
Status: CANCELLED | OUTPATIENT
Start: 2020-12-08

## 2020-12-04 RX ORDER — SODIUM CHLORIDE 0.9 % (FLUSH) 0.9 %
10 SYRINGE (ML) INJECTION AS NEEDED
Status: CANCELLED | OUTPATIENT
Start: 2020-12-08

## 2020-12-04 RX ORDER — ALBUTEROL SULFATE 0.83 MG/ML
2.5 SOLUTION RESPIRATORY (INHALATION) AS NEEDED
Status: CANCELLED
Start: 2021-01-19

## 2020-12-04 RX ORDER — SODIUM CHLORIDE 9 MG/ML
10 INJECTION INTRAMUSCULAR; INTRAVENOUS; SUBCUTANEOUS AS NEEDED
Status: CANCELLED | OUTPATIENT
Start: 2020-12-08

## 2020-12-04 RX ORDER — HYDROCORTISONE SODIUM SUCCINATE 100 MG/2ML
100 INJECTION, POWDER, FOR SOLUTION INTRAMUSCULAR; INTRAVENOUS AS NEEDED
Status: CANCELLED | OUTPATIENT
Start: 2020-12-29

## 2020-12-04 RX ORDER — ALBUTEROL SULFATE 0.83 MG/ML
2.5 SOLUTION RESPIRATORY (INHALATION) AS NEEDED
Status: CANCELLED
Start: 2020-12-29

## 2020-12-04 RX ORDER — ACETAMINOPHEN 325 MG/1
650 TABLET ORAL AS NEEDED
Status: CANCELLED
Start: 2020-12-29

## 2020-12-04 RX ORDER — SODIUM CHLORIDE 9 MG/ML
25 INJECTION, SOLUTION INTRAVENOUS CONTINUOUS
Status: CANCELLED | OUTPATIENT
Start: 2020-12-29

## 2020-12-04 RX ORDER — SODIUM CHLORIDE 9 MG/ML
25 INJECTION, SOLUTION INTRAVENOUS CONTINUOUS
Status: CANCELLED | OUTPATIENT
Start: 2021-01-19

## 2020-12-04 RX ORDER — ONDANSETRON 2 MG/ML
8 INJECTION INTRAMUSCULAR; INTRAVENOUS AS NEEDED
Status: CANCELLED | OUTPATIENT
Start: 2021-01-19

## 2020-12-04 RX ORDER — HEPARIN 100 UNIT/ML
300-500 SYRINGE INTRAVENOUS AS NEEDED
Status: CANCELLED
Start: 2020-12-29

## 2020-12-04 RX ORDER — ONDANSETRON 2 MG/ML
8 INJECTION INTRAMUSCULAR; INTRAVENOUS ONCE
Status: CANCELLED | OUTPATIENT
Start: 2020-12-08

## 2020-12-04 RX ORDER — ONDANSETRON 2 MG/ML
8 INJECTION INTRAMUSCULAR; INTRAVENOUS AS NEEDED
Status: CANCELLED | OUTPATIENT
Start: 2020-12-08

## 2020-12-04 RX ORDER — HEPARIN 100 UNIT/ML
300-500 SYRINGE INTRAVENOUS AS NEEDED
Status: CANCELLED
Start: 2020-12-08

## 2020-12-04 RX ORDER — ALBUTEROL SULFATE 0.83 MG/ML
2.5 SOLUTION RESPIRATORY (INHALATION) AS NEEDED
Status: CANCELLED
Start: 2020-12-08

## 2020-12-04 RX ORDER — HEPARIN 100 UNIT/ML
300-500 SYRINGE INTRAVENOUS AS NEEDED
Status: CANCELLED
Start: 2021-01-19

## 2020-12-04 RX ORDER — DIPHENHYDRAMINE HYDROCHLORIDE 50 MG/ML
50 INJECTION, SOLUTION INTRAMUSCULAR; INTRAVENOUS AS NEEDED
Status: CANCELLED
Start: 2021-01-19

## 2020-12-04 RX ORDER — SODIUM CHLORIDE 9 MG/ML
25 INJECTION, SOLUTION INTRAVENOUS CONTINUOUS
Status: CANCELLED | OUTPATIENT
Start: 2020-12-08

## 2020-12-04 RX ORDER — SODIUM CHLORIDE 9 MG/ML
10 INJECTION INTRAMUSCULAR; INTRAVENOUS; SUBCUTANEOUS AS NEEDED
Status: CANCELLED | OUTPATIENT
Start: 2020-12-29

## 2020-12-04 RX ORDER — DIPHENHYDRAMINE HYDROCHLORIDE 50 MG/ML
50 INJECTION, SOLUTION INTRAMUSCULAR; INTRAVENOUS AS NEEDED
Status: CANCELLED
Start: 2020-12-29

## 2020-12-04 RX ORDER — ONDANSETRON 2 MG/ML
8 INJECTION INTRAMUSCULAR; INTRAVENOUS ONCE
Status: CANCELLED | OUTPATIENT
Start: 2021-01-19

## 2020-12-04 RX ORDER — DIPHENHYDRAMINE HYDROCHLORIDE 50 MG/ML
25 INJECTION, SOLUTION INTRAMUSCULAR; INTRAVENOUS AS NEEDED
Status: CANCELLED
Start: 2020-12-29

## 2020-12-07 ENCOUNTER — APPOINTMENT (OUTPATIENT)
Dept: INFUSION THERAPY | Age: 40
End: 2020-12-07

## 2020-12-08 ENCOUNTER — RESEARCH ENCOUNTER (OUTPATIENT)
Dept: ONCOLOGY | Age: 40
End: 2020-12-08

## 2020-12-08 ENCOUNTER — HOSPITAL ENCOUNTER (OUTPATIENT)
Dept: INFUSION THERAPY | Age: 40
Discharge: HOME | End: 2020-12-08
Payer: COMMERCIAL

## 2020-12-08 ENCOUNTER — OFFICE VISIT (OUTPATIENT)
Dept: ONCOLOGY | Age: 40
End: 2020-12-08
Payer: COMMERCIAL

## 2020-12-08 VITALS
OXYGEN SATURATION: 96 % | TEMPERATURE: 98.4 F | BODY MASS INDEX: 21.58 KG/M2 | SYSTOLIC BLOOD PRESSURE: 159 MMHG | DIASTOLIC BLOOD PRESSURE: 91 MMHG | WEIGHT: 126.4 LBS | RESPIRATION RATE: 16 BRPM | HEIGHT: 64 IN | HEART RATE: 69 BPM

## 2020-12-08 VITALS
BODY MASS INDEX: 21.51 KG/M2 | SYSTOLIC BLOOD PRESSURE: 146 MMHG | OXYGEN SATURATION: 95 % | DIASTOLIC BLOOD PRESSURE: 96 MMHG | HEIGHT: 64 IN | WEIGHT: 126 LBS | HEART RATE: 70 BPM | TEMPERATURE: 98.4 F | RESPIRATION RATE: 16 BRPM

## 2020-12-08 DIAGNOSIS — C50.912 STAGE II BREAST CANCER, LEFT (HCC): Primary | ICD-10-CM

## 2020-12-08 DIAGNOSIS — C50.112 MALIGNANT NEOPLASM OF CENTRAL PORTION OF LEFT BREAST IN FEMALE, ESTROGEN RECEPTOR POSITIVE (HCC): Primary | ICD-10-CM

## 2020-12-08 DIAGNOSIS — Z17.0 MALIGNANT NEOPLASM OF CENTRAL PORTION OF LEFT BREAST IN FEMALE, ESTROGEN RECEPTOR POSITIVE (HCC): Primary | ICD-10-CM

## 2020-12-08 LAB
ALBUMIN SERPL-MCNC: 4.1 G/DL (ref 3.5–5)
ALBUMIN/GLOB SERPL: 1.3 {RATIO} (ref 1.1–2.2)
ALP SERPL-CCNC: 66 U/L (ref 45–117)
ALT SERPL-CCNC: 24 U/L (ref 12–78)
ANION GAP SERPL CALC-SCNC: 3 MMOL/L (ref 5–15)
AST SERPL-CCNC: 20 U/L (ref 15–37)
BASO+EOS+MONOS # BLD AUTO: 0.3 K/UL (ref 0.2–1.2)
BASO+EOS+MONOS NFR BLD AUTO: 9 % (ref 3.2–16.9)
BILIRUB SERPL-MCNC: 0.6 MG/DL (ref 0.2–1)
BUN SERPL-MCNC: 10 MG/DL (ref 6–20)
BUN/CREAT SERPL: 15 (ref 12–20)
CALCIUM SERPL-MCNC: 9.1 MG/DL (ref 8.5–10.1)
CHLORIDE SERPL-SCNC: 106 MMOL/L (ref 97–108)
CO2 SERPL-SCNC: 31 MMOL/L (ref 21–32)
CREAT SERPL-MCNC: 0.68 MG/DL (ref 0.55–1.02)
DIFFERENTIAL METHOD BLD: ABNORMAL
ERYTHROCYTE [DISTWIDTH] IN BLOOD BY AUTOMATED COUNT: 12.2 % (ref 11.8–15.8)
GLOBULIN SER CALC-MCNC: 3.2 G/DL (ref 2–4)
GLUCOSE SERPL-MCNC: 92 MG/DL (ref 65–100)
HCT VFR BLD AUTO: 35.4 % (ref 35–47)
HGB BLD-MCNC: 12.1 G/DL (ref 11.5–16)
LYMPHOCYTES # BLD: 1.1 K/UL (ref 0.8–3.5)
LYMPHOCYTES NFR BLD: 35 % (ref 12–49)
MCH RBC QN AUTO: 31 PG (ref 26–34)
MCHC RBC AUTO-ENTMCNC: 34.2 G/DL (ref 30–36.5)
MCV RBC AUTO: 90.8 FL (ref 80–99)
NEUTS SEG # BLD: 1.7 K/UL (ref 1.8–8)
NEUTS SEG NFR BLD: 56 % (ref 32–75)
PLATELET # BLD AUTO: 142 K/UL (ref 150–400)
POTASSIUM SERPL-SCNC: 3.6 MMOL/L (ref 3.5–5.1)
PROT SERPL-MCNC: 7.3 G/DL (ref 6.4–8.2)
RBC # BLD AUTO: 3.9 M/UL (ref 3.8–5.2)
SODIUM SERPL-SCNC: 140 MMOL/L (ref 136–145)
WBC # BLD AUTO: 3.1 K/UL (ref 3.6–11)

## 2020-12-08 PROCEDURE — 74011250636 HC RX REV CODE- 250/636: Performed by: INTERNAL MEDICINE

## 2020-12-08 PROCEDURE — 96413 CHEMO IV INFUSION 1 HR: CPT

## 2020-12-08 PROCEDURE — 96375 TX/PRO/DX INJ NEW DRUG ADDON: CPT

## 2020-12-08 PROCEDURE — 99214 OFFICE O/P EST MOD 30 MIN: CPT | Performed by: INTERNAL MEDICINE

## 2020-12-08 PROCEDURE — 80053 COMPREHEN METABOLIC PANEL: CPT

## 2020-12-08 PROCEDURE — 36415 COLL VENOUS BLD VENIPUNCTURE: CPT

## 2020-12-08 PROCEDURE — 77030016057 HC NDL HUBR APOL -B

## 2020-12-08 PROCEDURE — 85025 COMPLETE CBC W/AUTO DIFF WBC: CPT

## 2020-12-08 RX ORDER — ACETAMINOPHEN 325 MG/1
650 TABLET ORAL AS NEEDED
Status: DISCONTINUED | OUTPATIENT
Start: 2020-12-08 | End: 2020-12-09 | Stop reason: HOSPADM

## 2020-12-08 RX ORDER — SODIUM CHLORIDE 9 MG/ML
25 INJECTION, SOLUTION INTRAVENOUS CONTINUOUS
Status: DISCONTINUED | OUTPATIENT
Start: 2020-12-08 | End: 2020-12-09 | Stop reason: HOSPADM

## 2020-12-08 RX ORDER — ONDANSETRON 2 MG/ML
8 INJECTION INTRAMUSCULAR; INTRAVENOUS ONCE
Status: COMPLETED | OUTPATIENT
Start: 2020-12-08 | End: 2020-12-08

## 2020-12-08 RX ORDER — ONDANSETRON 2 MG/ML
8 INJECTION INTRAMUSCULAR; INTRAVENOUS AS NEEDED
Status: DISCONTINUED | OUTPATIENT
Start: 2020-12-08 | End: 2020-12-09 | Stop reason: HOSPADM

## 2020-12-08 RX ORDER — SODIUM CHLORIDE 0.9 % (FLUSH) 0.9 %
10 SYRINGE (ML) INJECTION AS NEEDED
Status: DISCONTINUED | OUTPATIENT
Start: 2020-12-08 | End: 2020-12-09 | Stop reason: HOSPADM

## 2020-12-08 RX ORDER — DIPHENHYDRAMINE HYDROCHLORIDE 50 MG/ML
25 INJECTION, SOLUTION INTRAMUSCULAR; INTRAVENOUS AS NEEDED
Status: DISCONTINUED | OUTPATIENT
Start: 2020-12-08 | End: 2020-12-09 | Stop reason: HOSPADM

## 2020-12-08 RX ORDER — HEPARIN 100 UNIT/ML
300-500 SYRINGE INTRAVENOUS AS NEEDED
Status: DISCONTINUED | OUTPATIENT
Start: 2020-12-08 | End: 2020-12-09 | Stop reason: HOSPADM

## 2020-12-08 RX ORDER — SODIUM CHLORIDE 9 MG/ML
10 INJECTION INTRAMUSCULAR; INTRAVENOUS; SUBCUTANEOUS AS NEEDED
Status: DISCONTINUED | OUTPATIENT
Start: 2020-12-08 | End: 2020-12-09 | Stop reason: HOSPADM

## 2020-12-08 RX ORDER — CARVEDILOL 12.5 MG/1
12.5 TABLET ORAL 2 TIMES DAILY WITH MEALS
Qty: 60 TAB | Refills: 2 | Status: SHIPPED | OUTPATIENT
Start: 2020-12-08 | End: 2021-03-09

## 2020-12-08 RX ADMIN — SODIUM CHLORIDE 10 ML: 9 INJECTION INTRAMUSCULAR; INTRAVENOUS; SUBCUTANEOUS at 10:35

## 2020-12-08 RX ADMIN — Medication 10 ML: at 15:54

## 2020-12-08 RX ADMIN — SODIUM CHLORIDE 25 ML/HR: 900 INJECTION, SOLUTION INTRAVENOUS at 12:43

## 2020-12-08 RX ADMIN — Medication 10 ML: at 10:36

## 2020-12-08 RX ADMIN — ONDANSETRON 8 MG: 2 INJECTION INTRAMUSCULAR; INTRAVENOUS at 12:43

## 2020-12-08 RX ADMIN — Medication 500 UNITS: at 15:54

## 2020-12-08 RX ADMIN — ADO-TRASTUZUMAB EMTANSINE 207 MG: 20 INJECTION, POWDER, LYOPHILIZED, FOR SOLUTION INTRAVENOUS at 13:30

## 2020-12-08 NOTE — PROGRESS NOTES
Tiffany Knutson is a 38 y.o. female Follow up for the Evaluation of Breast Cancer. 1. Have you been to the ER, urgent care clinic since your last visit? Hospitalized since your last visit? No    2. Have you seen or consulted any other health care providers outside of the [address] [place] [address] since your last visit? Include any pap smears or colon screening.  No

## 2020-12-08 NOTE — PROGRESS NOTES
[facility] Progress Note    Date: 2020    Name: Ferdinand Prado    MRN: [id number]         : 1980    Ms. Maria De Jesus Dobbs Arrived ambulatory and in no distress for C1D1 of Kadcyla Regimen. Assessment was completed, no acute issues at this time, no new complaints voiced. Right chest wall port accessed without difficulty, labs drawn & sent for processing. Results are within parameters to treat. Patient stayed 1 hour post treatment observation. Covid Questionnaire completed. 1. Do you have any symptoms of covid 19? SOB, coughing, fever, or generally not feeling well? - no  2. Have you been exposed to covid 19 recently? - no  3. Have you had any recent contact with family/friend that has a pending covid test? no      Chemotherapy Flowsheet 2020   Cycle C1D1   Date 2020   Drug / Regimen Kadcyla   Pre Meds given   Notes -       Patient proceed to MD appointment. Patient returned with no change in treatment. Ms. Mulligan's vitals were reviewed. Visit Vitals  BP (!) 146/96 (BP 1 Location: Right arm, BP Patient Position: Sitting)   Temp 98.4 °F (36.9 °C)   Resp 16   Ht 5' 4\" (1.626 m)   Wt 57.3 kg (126 lb 6.4 oz)   SpO2 95%   Breastfeeding No   BMI 21.70 kg/m²     Patient Vitals for the past 12 hrs:   Temp Pulse Resp BP SpO2   20 1554 -- 69 16 (!) 159/91 96 %   20 1021 98.4 °F (36.9 °C) -- 16 (!) 146/96 95 %       Lab results were obtained and reviewed. Recent Results (from the past 12 hour(s))   CBC WITH 3 PART DIFF    Collection Time: 20 10:36 AM   Result Value Ref Range    WBC 3.1 (L) 3.6 - 11.0 K/uL    RBC 3.90 3.80 - 5.20 M/uL    HGB 12.1 11.5 - 16.0 g/dL    HCT 35.4 35.0 - 47.0 %    MCV 90.8 80.0 - 99.0 FL    MCH 31.0 26.0 - 34.0 PG    MCHC 34.2 30.0 - 36.5 g/dL    RDW 12.2 11.8 - 15.8 %    PLATELET 150 (L) 490 - 400 K/uL    NEUTROPHILS 56 32 - 75 %    MIXED CELLS 9 3.2 - 16.9 %    LYMPHOCYTES 35 12 - 49 %    ABS. NEUTROPHILS 1.7 (L) 1.8 - 8.0 K/UL    ABS.  MIXED CELLS 0.3 0.2 - 1.2 K/uL    ABS. LYMPHOCYTES 1.1 0.8 - 3.5 K/UL    DF AUTOMATED       Medications:  Medications Administered     0.9% sodium chloride infusion     Admin Date  12/08/2020 Action  New Bag Dose  25 mL/hr Rate  25 mL/hr Route  IntraVENous Administered By  Blade Batista RN          0.9% sodium chloride injection 10 mL     Admin Date  12/08/2020 Action  Given Dose  10 mL Route  IntraVENous Administered By  Blade Batista RN          ADO-trastuzumab emtansine (KADCYLA) 207 mg in 0.9% sodium chloride 250 mL, overfill volume 25 mL Chemo infusion     Admin Date  12/08/2020 Action  New Bag Dose  207 mg Rate  190.2 mL/hr Route  IntraVENous Administered By  Tony Ni RN          heparin (porcine) pf 300-500 Units     Admin Date  12/08/2020 Action  Given Dose  500 Units Route  InterCATHeter Administered By  Blade Batista RN          ondansetron [facility]) injection 8 mg     Admin Date  12/08/2020 Action  Given Dose  8 mg Route  IntraVENous Administered By  Blade Batista RN          sodium chloride (NS) flush 10 mL     Admin Date  12/08/2020 Action  Given Dose  10 mL Route  IntraVENous Administered By  Blade Batista RN           Admin Date  12/08/2020 Action  Given Dose  10 mL Route  IntraVENous Administered By  Blade Batista RN                Ms. Irwin Garnica tolerated treatment well and was discharged from [place] [address] in stable condition at 1600. Port de-accessed, flushed & heparinized per protocol.  She is to return on     Future Appointments   Date Time Provider Han Ann   12/29/2020 10:30 AM SS INF3 CH3 <4H [facility]   1/19/2021 10:30 AM SS INF3 CH3 <4H [facility]   2/9/2021 10:00 [facility]   3/2/2021 10:00 AM [facility]   3/23/2021 10:00 AM SS INF7 CH4 <4H [facility]

## 2020-12-08 NOTE — PROGRESS NOTES
[facility]  [address] [place] [address], [address]  W: [phone]  F: [phone]      Reason for Visit:   Tiffany Knutson is a 38 y.o. female who is seen in consultation at the request of Dr. Zoraida Kim for evaluation of therapy for breast cancer    Plastic surg:  Dr. Trisha Perales onc:  Dr. Marielos Linder    Treatment History:   · 20 left breast ax core bx: Adenocarcinoma, ER + at 86% ; OR + at 69%; HER 2 POSITIVE (IHC 2+, FISH ratio 3.2; sig/cell 6.08), ki67 29%  · 20 L breast core bx: Atypical 1 mm focus, highly suspicious for [facility], lobular features, the tumor does not histologically match the patient's prior axillary cancer, but could represent a small sample of the same tumor  · 20 L breast excisional bx: Subareolar lumpectomy, IDC, 1.6 cm, invasive tumor present at anterior and lateral margins, gr 2, + LVI, 1/1 LN involved, 1.1 cm, ER + at 48%, OR + at 72%, HER 2 POSITIVE (IHC 2+, FISH ratio 2, sig/cell 4.14)  · 20 [facility] genetic testing:  negative  · TCHP 2020-2020  · 2020 Bilateral mastectomy: Right breast: benign. Left breast: Inflammation, no residual carcinoma, 2/6 LNs (micromets in both, 1.7 mm, and 1.1 mm). pT1c ypN1mi cM0  · T-DM1 20-    History of Present Illness:   She noticed a L axilla mass in 2020, leading to the pathology above. Interval history:  In today for follow up and treatment. Complains of gr 2 hot flashes, gr 1 anxiety, gr 1 itching, gr 1 headache    Complains of L eye irritation    FH:   No breast, ovarian, prostate, or pancreas cancer    Past Medical History:   Diagnosis Date    Anxiety     Cancer [facility])     Essential hypertension     HX OTHER MEDICAL ,         Infertility     IVF with first pregnancy    Infertility, female     Joint pain     Psychiatric problem       Past Surgical History:   Procedure Laterality Date    HX BILATERAL MASTECTOMY      HX OTHER SURGICAL [place] Teeth Removal    HX OTHER SURGICAL      1/2017 Excision of mole on toe with skin graph      Social History     Tobacco Use    Smoking status: Never Smoker    Smokeless tobacco: Never Used   Substance Use Topics    Alcohol use: Yes     Frequency: 4 or more times a week      Family History   Problem Relation Age of Onset    Diabetes Mother     Heart Disease Mother     Hypertension Mother     Hypertension Father     Cancer Paternal Grandmother         Lung cancer    Stroke Paternal Grandmother     Cancer Paternal Grandfather         Melanoma     Current Outpatient Medications   Medication Sig    celecoxib (CELEBREX PO) Take  by mouth two (2) times a day.  acetaminophen (TYLENOL PO) Take  by mouth.  carvediloL (COREG) 3.125 mg tablet TAKE 1 TABLET BY MOUTH TWICE A DAY WITH MEALS    magic mouthwash solution Magic mouth wash   Maalox  Lidocaine 2% viscous   Diphenhydramine oral solution     Pharmacy to mix equal portions of ingredients to a total volume as indicated in the dispense amount. 10-15 ml swish/spit and may swallow for throat pain every 4 hours PRN.  doxycycline (VIBRAMYCIN) 100 mg capsule TAKE 1 CAPSULE BY MOUTH TWICE A DAY *INS LIMITS 30 DAY    prochlorperazine (COMPAZINE) 10 mg tablet Take 1 Tab by mouth every six (6) hours as needed for Nausea.  dexAMETHasone (DECADRON) 4 mg tablet 8 mg bid the day before and day after chemo    lidocaine-prilocaine (EMLA) topical cream Apply  to affected area as needed for Pain.  ondansetron (ZOFRAN ODT) 8 mg disintegrating tablet Take 1 Tab by mouth every eight (8) hours as needed for Nausea or Vomiting. For 2 days following chemo    ibuprofen (MOTRIN) 600 mg tablet Take 1 Tab by mouth every six (6) hours as needed for Pain.  sertraline (ZOLOFT) 25 mg tablet Take 25 mg by mouth daily.  PNV No.22-Iron Cbn&Gluc-FA-DOS 27-1-50 mg Tab Take  by mouth.     Indications: PREGNANCY     No current facility-administered medications for this visit. No Known Allergies     Review of Systems: A complete review of systems was obtained, negative except as described above. Physical Exam:     Visit Vitals  BP (!) 146/96 (BP 1 Location: Left arm, BP Patient Position: Sitting)   Pulse 70   Temp 98.4 °F (36.9 °C) (Temporal)   Resp 16   Ht 5' 4\" (1.626 m)   Wt 126 lb (57.2 kg)   SpO2 95%   BMI 21.63 kg/m²     ECOG PS: 0    Constitutional: Appears well-developed and well-nourished in no apparent distress      Mental status: Alert and awake, Oriented to person/place/time, Able to follow commands    Eyes: EOM normal, Sclera normal, No visible ocular discharge    HENT: Normocephalic, atraumatic    Mouth/Throat: Moist mucous membranes    External Ears normal    Neck: No visualized mass    Pulmonary/Chest: Respiratory effort normal, No visualized signs of difficulty breathing or respiratory distress    Musculoskeletal: Normal gait with no signs of ataxia, Normal range of motion of neck    Neurological: No facial asymmetry (Cranial nerve 7 motor function), No gaze palsy    Skin: No significant exanthematous lesions or discoloration noted on facial skin    Psychiatric: Normal affect, No hallucinations         Results:     Lab Results   Component Value Date/Time    WBC 3.1 (L) 12/08/2020 10:36 AM    HGB 12.1 12/08/2020 10:36 AM    HCT 35.4 12/08/2020 10:36 AM    PLATELET 738 (L) 12/77/4446 10:36 AM    MCV 90.8 12/08/2020 10:36 AM    ABS.  NEUTROPHILS 1.7 (L) 12/08/2020 10:36 AM     Lab Results   Component Value Date/Time    Sodium 140 12/08/2020 10:36 AM    Potassium 3.6 12/08/2020 10:36 AM    Chloride 106 12/08/2020 10:36 AM    CO2 31 12/08/2020 10:36 AM    Glucose 92 12/08/2020 10:36 AM    BUN 10 12/08/2020 10:36 AM    Creatinine 0.68 12/08/2020 10:36 AM    GFR est AA >60 12/08/2020 10:36 AM    GFR est non-AA >60 12/08/2020 10:36 AM    Calcium 9.1 12/08/2020 10:36 AM     Lab Results   Component Value Date/Time    Bilirubin, total 0.6 12/08/2020 10:36 AM ALT (SGPT) 24 12/08/2020 10:36 AM    Alk. phosphatase 66 12/08/2020 10:36 AM    Protein, total 7.3 12/08/2020 10:36 AM    Albumin 4.1 12/08/2020 10:36 AM    Globulin 3.2 12/08/2020 10:36 AM     5/20/20 breast  MRI  Subareolar region of L breast 1.2 cm mass, at least 2 LN on left, 3 cm largest  Right breast negative    5/20/20 bone scan  Heterogeneous activity in the ribs of uncertain significance. This would be an unusual presentation of bony metastatic disease    5/20/20 CT c/a/p  negative    Records reviewed and summarized above. Pathology report(s) reviewed above. Radiology report(s) reviewed above. Assessment/plan:   1.  L breast cancer, LN + by core, ER +, OR +, HER 2 POSITIVE, cT1c cN1a cM0:  Stage IIA, prognostic stage IB    We explained to the patient that the goal of systemic adjuvant therapy is to improve the chances for cure and decrease the risk of relapse. We explained why a patient can have microscopic cancer spread now even though physical examination, laboratory studies and imaging studies are negative for cancer. We explained that the same treatments used now as adjuvant or preventive treatments rarely if ever are curative in women who develop metastases. TTE on 5/28/2020 EF 64%, TTE on 8/10/2020, EF 60%, TTE on 9/11/2020, EF 61%, overall stable, next due 12/3/2020, ordered. IUD was removed by gyn. Bilateral mastectomies on 11/12/2020, no residual carcinoma in breast, 2/6 LNs (micromets). She had her last drain removed on 11/23/2020. Believes she has some fluid that has accumulated, has follow up with Dr. Carmita Boston today. She has seen Dr. Frankie Angelo for [address]. Plan for CT sim on 12/10/2020, however this may be delayed due to limiting ROM. She has been referred to lymphedema at [facility].      Discussed that with residual disease, will change from trastuzumab and pertuzumab to T-DM1 3.6 mg/kg IV q 3 weeks x 14 doses.  Side effects of T-DM1 include hepatotoxicity, pulmonary tox, thrombocytopenia, infusion reaction (rare), cardiotoxicity.  She is agreeable and has signed informed consent. Discussed neratinib 240 mg daily with food for one year after the completion of herceptin. Discussed that there was only a 2% DFS benefit and that benefit was largely driven in the ER + population. Discussed that there are no data in its use post-T-DM1. Will discuss again after T-DM1.     Discussed common side effects of neratinib including but not limited to diarrhea, nausea, abdominal pain, fatigue, vomiting, rash, swollen and sore mouth (stomatitis), decreased appetite, muscle spasms, indigestion (dyspepsia), liver damage (AST or ALT enzyme increase), nail disorder, dry skin, abdominal swelling (distention), weight loss and urinary tract infection. 2. Emotional well being:  She has excellent support and is coping well with her disease    3. Genetic testing:  discussed potential ramifications of a positive test including the potential need for bilateral mastectomies and bilateral oophorectomies and the risk then for her family members to also have a mutation. VUS also discussed. Tested in Dr. Titus Uri office on 5/21/20, negative    4. Loss of fertility:  Discussed potential loss of menses and fertility. She is not interested in having further children. Declines oncofertility consult    5. 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 6.  Decline in LV strain and HTN: 7.8% decline since initial TTE on 5/28/2020, started on Coreg 3.125 mg BID. Repeat TTE on 9/10/2020, EF 61%, stable, 12/2/2020, stable 60%, strain stable. Increased to coreg 6.25 mg bid last week. Will increase to coreg 12.5 mg bid today    7. Thrombocytopenia:  Due to chemo. Will monitor. 8. Anemia:  Due to chemo, will monitor    9. L eye irritation:  Will refer to see ophtho; OTC eye drops          I appreciate the opportunity to participate in Ms. Rachel Mulligan's care.     Signed By: Juan Carlos Marquis Steff Garcia MD      No orders of the defined types were placed in this encounter.

## 2020-12-29 ENCOUNTER — HOSPITAL ENCOUNTER (OUTPATIENT)
Dept: INFUSION THERAPY | Age: 40
Discharge: HOME | End: 2020-12-29
Payer: COMMERCIAL

## 2020-12-29 ENCOUNTER — OFFICE VISIT (OUTPATIENT)
Dept: ONCOLOGY | Age: 40
End: 2020-12-29
Payer: COMMERCIAL

## 2020-12-29 VITALS
OXYGEN SATURATION: 98 % | DIASTOLIC BLOOD PRESSURE: 85 MMHG | HEIGHT: 64 IN | TEMPERATURE: 99 F | BODY MASS INDEX: 21.68 KG/M2 | WEIGHT: 127 LBS | RESPIRATION RATE: 18 BRPM | HEART RATE: 66 BPM | SYSTOLIC BLOOD PRESSURE: 123 MMHG

## 2020-12-29 VITALS
SYSTOLIC BLOOD PRESSURE: 138 MMHG | HEART RATE: 68 BPM | WEIGHT: 127.2 LBS | RESPIRATION RATE: 15 BRPM | BODY MASS INDEX: 21.72 KG/M2 | OXYGEN SATURATION: 98 % | HEIGHT: 64 IN | DIASTOLIC BLOOD PRESSURE: 85 MMHG | TEMPERATURE: 98.8 F

## 2020-12-29 DIAGNOSIS — Z17.0 MALIGNANT NEOPLASM OF CENTRAL PORTION OF LEFT BREAST IN FEMALE, ESTROGEN RECEPTOR POSITIVE (HCC): Primary | ICD-10-CM

## 2020-12-29 DIAGNOSIS — C50.912 STAGE II BREAST CANCER, LEFT (HCC): Primary | ICD-10-CM

## 2020-12-29 DIAGNOSIS — C50.112 MALIGNANT NEOPLASM OF CENTRAL PORTION OF LEFT BREAST IN FEMALE, ESTROGEN RECEPTOR POSITIVE (HCC): Primary | ICD-10-CM

## 2020-12-29 LAB
ALBUMIN SERPL-MCNC: 4 G/DL (ref 3.5–5)
ALBUMIN/GLOB SERPL: 1.1 {RATIO} (ref 1.1–2.2)
ALP SERPL-CCNC: 72 U/L (ref 45–117)
ALT SERPL-CCNC: 47 U/L (ref 12–78)
ANION GAP SERPL CALC-SCNC: 4 MMOL/L (ref 5–15)
AST SERPL-CCNC: 43 U/L (ref 15–37)
BASO+EOS+MONOS # BLD AUTO: 0.3 K/UL (ref 0.2–1.2)
BASO+EOS+MONOS NFR BLD AUTO: 11 % (ref 3.2–16.9)
BILIRUB SERPL-MCNC: 0.5 MG/DL (ref 0.2–1)
BUN SERPL-MCNC: 11 MG/DL (ref 6–20)
BUN/CREAT SERPL: 16 (ref 12–20)
CALCIUM SERPL-MCNC: 8.9 MG/DL (ref 8.5–10.1)
CHLORIDE SERPL-SCNC: 105 MMOL/L (ref 97–108)
CO2 SERPL-SCNC: 31 MMOL/L (ref 21–32)
CREAT SERPL-MCNC: 0.69 MG/DL (ref 0.55–1.02)
DIFFERENTIAL METHOD BLD: ABNORMAL
ERYTHROCYTE [DISTWIDTH] IN BLOOD BY AUTOMATED COUNT: 12.4 % (ref 11.8–15.8)
FSH SERPL-ACNC: 95 MIU/ML
GLOBULIN SER CALC-MCNC: 3.5 G/DL (ref 2–4)
GLUCOSE SERPL-MCNC: 139 MG/DL (ref 65–100)
HCT VFR BLD AUTO: 37.7 % (ref 35–47)
HGB BLD-MCNC: 12.9 G/DL (ref 11.5–16)
LH SERPL-ACNC: 38.5 MIU/ML
LYMPHOCYTES # BLD: 0.9 K/UL (ref 0.8–3.5)
LYMPHOCYTES NFR BLD: 29 % (ref 12–49)
MCH RBC QN AUTO: 30.4 PG (ref 26–34)
MCHC RBC AUTO-ENTMCNC: 34.2 G/DL (ref 30–36.5)
MCV RBC AUTO: 88.9 FL (ref 80–99)
NEUTS SEG # BLD: 1.8 K/UL (ref 1.8–8)
NEUTS SEG NFR BLD: 60 % (ref 32–75)
PLATELET # BLD AUTO: 131 K/UL (ref 150–400)
POTASSIUM SERPL-SCNC: 3.4 MMOL/L (ref 3.5–5.1)
PROT SERPL-MCNC: 7.5 G/DL (ref 6.4–8.2)
RBC # BLD AUTO: 4.24 M/UL (ref 3.8–5.2)
SODIUM SERPL-SCNC: 140 MMOL/L (ref 136–145)
WBC # BLD AUTO: 3 K/UL (ref 3.6–11)

## 2020-12-29 PROCEDURE — 82670 ASSAY OF TOTAL ESTRADIOL: CPT

## 2020-12-29 PROCEDURE — 85025 COMPLETE CBC W/AUTO DIFF WBC: CPT

## 2020-12-29 PROCEDURE — 74011250637 HC RX REV CODE- 250/637: Performed by: NURSE PRACTITIONER

## 2020-12-29 PROCEDURE — 74011250636 HC RX REV CODE- 250/636: Performed by: INTERNAL MEDICINE

## 2020-12-29 PROCEDURE — 96413 CHEMO IV INFUSION 1 HR: CPT

## 2020-12-29 PROCEDURE — 80053 COMPREHEN METABOLIC PANEL: CPT

## 2020-12-29 PROCEDURE — 74011000250 HC RX REV CODE- 250: Performed by: INTERNAL MEDICINE

## 2020-12-29 PROCEDURE — 96375 TX/PRO/DX INJ NEW DRUG ADDON: CPT

## 2020-12-29 PROCEDURE — 83001 ASSAY OF GONADOTROPIN (FSH): CPT

## 2020-12-29 PROCEDURE — 99214 OFFICE O/P EST MOD 30 MIN: CPT | Performed by: INTERNAL MEDICINE

## 2020-12-29 PROCEDURE — 77030016057 HC NDL HUBR APOL -B

## 2020-12-29 PROCEDURE — 36415 COLL VENOUS BLD VENIPUNCTURE: CPT

## 2020-12-29 RX ORDER — ONDANSETRON 2 MG/ML
8 INJECTION INTRAMUSCULAR; INTRAVENOUS ONCE
Status: COMPLETED | OUTPATIENT
Start: 2020-12-29 | End: 2020-12-29

## 2020-12-29 RX ORDER — HEPARIN 100 UNIT/ML
300-500 SYRINGE INTRAVENOUS AS NEEDED
Status: ACTIVE | OUTPATIENT
Start: 2020-12-29 | End: 2020-12-29

## 2020-12-29 RX ORDER — SODIUM CHLORIDE 9 MG/ML
10 INJECTION INTRAMUSCULAR; INTRAVENOUS; SUBCUTANEOUS AS NEEDED
Status: ACTIVE | OUTPATIENT
Start: 2020-12-29 | End: 2020-12-29

## 2020-12-29 RX ORDER — POTASSIUM CHLORIDE 1.5 G/1.77G
40 POWDER, FOR SOLUTION ORAL ONCE
Status: COMPLETED | OUTPATIENT
Start: 2020-12-29 | End: 2020-12-29

## 2020-12-29 RX ORDER — SODIUM CHLORIDE 9 MG/ML
25 INJECTION, SOLUTION INTRAVENOUS CONTINUOUS
Status: DISCONTINUED | OUTPATIENT
Start: 2020-12-29 | End: 2020-12-30 | Stop reason: HOSPADM

## 2020-12-29 RX ORDER — POTASSIUM CHLORIDE 750 MG/1
40 TABLET, FILM COATED, EXTENDED RELEASE ORAL
Status: DISCONTINUED | OUTPATIENT
Start: 2020-12-29 | End: 2020-12-29

## 2020-12-29 RX ORDER — SODIUM CHLORIDE 0.9 % (FLUSH) 0.9 %
10 SYRINGE (ML) INJECTION AS NEEDED
Status: DISPENSED | OUTPATIENT
Start: 2020-12-29 | End: 2020-12-29

## 2020-12-29 RX ADMIN — Medication 10 ML: at 14:05

## 2020-12-29 RX ADMIN — ONDANSETRON 8 MG: 2 INJECTION INTRAMUSCULAR; INTRAVENOUS at 12:36

## 2020-12-29 RX ADMIN — SODIUM CHLORIDE 10 ML: 9 INJECTION, SOLUTION INTRAMUSCULAR; INTRAVENOUS; SUBCUTANEOUS at 10:51

## 2020-12-29 RX ADMIN — SODIUM CHLORIDE 25 ML/HR: 900 INJECTION, SOLUTION INTRAVENOUS at 12:36

## 2020-12-29 RX ADMIN — ADO-TRASTUZUMAB EMTANSINE 200 MG: 20 INJECTION, POWDER, LYOPHILIZED, FOR SOLUTION INTRAVENOUS at 13:25

## 2020-12-29 RX ADMIN — POTASSIUM CHLORIDE 40 MEQ: 1.5 POWDER, FOR SOLUTION ORAL at 13:22

## 2020-12-29 RX ADMIN — HEPARIN 500 UNITS: 100 SYRINGE at 14:05

## 2020-12-29 NOTE — PROGRESS NOTES
[facility] Progress Note    Date: 2020    Name: Montserrat Bocanegra    MRN: [id number]         : 1980    Ms. Angelina Lopes Arrived ambulatory and in no distress for C2D1 of Kadcyla Regimen. Assessment was completed, no acute issues at this time, no new complaints voiced. Right chest wall port accessed without difficulty, labs drawn & sent for processing. Covid questionnaire completed. 1. Do you have any symptoms of COVID-19? SOB, coughing, fever, or generally not feeling well -No    2. Have you been exposed to COVID-19 recently? - No    3. Have you had any recent contact with family/friend that has a pending COVID test? -No    Chemotherapy Flowsheet 2020   Cycle C2D1   Date 2020   Drug / Regimen Kadcyla   Pre Meds -   Notes -       Patient proceed to appointment with Dr. Amelie Taylor. Ms. Mulligan's vitals were reviewed. Patient Vitals for the past 12 hrs:   Temp Pulse Resp BP SpO2   20 1048 99 °F (37.2 °C) 66 18 123/85 98 %       Lab results were obtained and reviewed. Echo 20. EF 60%. Recent Results (from the past 12 hour(s))   CBC WITH 3 PART DIFF    Collection Time: 20 10:46 AM   Result Value Ref Range    WBC 3.0 (L) 3.6 - 11.0 K/uL    RBC 4.24 3.80 - 5.20 M/uL    HGB 12.9 11.5 - 16.0 g/dL    HCT 37.7 35.0 - 47.0 %    MCV 88.9 80.0 - 99.0 FL    MCH 30.4 26.0 - 34.0 PG    MCHC 34.2 30.0 - 36.5 g/dL    RDW 12.4 11.8 - 15.8 %    PLATELET 321 (L) 273 - 400 K/uL    NEUTROPHILS 60 32 - 75 %    MIXED CELLS 11 3.2 - 16.9 %    LYMPHOCYTES 29 12 - 49 %    ABS. NEUTROPHILS 1.8 1.8 - 8.0 K/UL    ABS. MIXED CELLS 0.3 0.2 - 1.2 K/uL    ABS.  LYMPHOCYTES 0.9 0.8 - 3.5 K/UL    DF AUTOMATED     METABOLIC PANEL, COMPREHENSIVE    Collection Time: 20 10:46 AM   Result Value Ref Range    Sodium 140 136 - 145 mmol/L    Potassium 3.4 (L) 3.5 - 5.1 mmol/L    Chloride 105 97 - 108 mmol/L    CO2 31 21 - 32 mmol/L    Anion gap 4 (L) 5 - 15 mmol/L    Glucose 139 (H) 65 - 100 mg/dL    BUN 11 6 - 20 MG/DL    Creatinine 0.69 0.55 - 1.02 MG/DL    BUN/Creatinine ratio 16 12 - 20      GFR est AA >60 >60 ml/min/1.73m2    GFR est non-AA >60 >60 ml/min/1.73m2    Calcium 8.9 8.5 - 10.1 MG/DL    Bilirubin, total 0.5 0.2 - 1.0 MG/DL    ALT (SGPT) 47 12 - 78 U/L    AST (SGOT) 43 (H) 15 - 37 U/L    Alk. phosphatase 72 45 - 117 U/L    Protein, total 7.5 6.4 - 8.2 g/dL    Albumin 4.0 3.5 - 5.0 g/dL    Globulin 3.5 2.0 - 4.0 g/dL    A-G Ratio 1.1 1.1 - 2.2         Medications:  Zofran IVP  Kadcyla IV  Potassium PO    Pt declined full 30 minute wait period post infusion. Ms. Dale Jones tolerated treatment well and was discharged from [place] [address] in stable condition. Port de-accessed, flushed & heparinized per protocol. She is to return on 1/19/21 for her next appointment.     Verl Klinefelter, RN  December 29, 2020

## 2020-12-29 NOTE — PROGRESS NOTES
Jayme Edmonds is a 38 y.o. female Follow up for the Evaluation of Breast Cancer. 1. Have you been to the ER, urgent care clinic since your last visit? Hospitalized since your last visit? No    2. Have you seen or consulted any other health care providers outside of the [address] [place] [address] since your last visit? Include any pap smears or colon screening.  No

## 2020-12-29 NOTE — PROGRESS NOTES
[facility] [place] [address] [address] [facility], [address] [facility] [address] [facility]  W: [phone]  F: [phone]      Reason for Visit:   Marco Mcmullen is a 38 y.o. female who is seen in consultation at the request of Dr. Riddhi Yoon for evaluation of therapy for breast cancer    Plastic surg:  Dr. Chelsea Villa onc:  Dr. Eduardo Ozuna    Treatment History:   · 20 left breast ax core bx: Adenocarcinoma, ER + at 86% ; WI + at 69%; HER 2 POSITIVE (IHC 2+, FISH ratio 3.2; sig/cell 6.08), ki67 29%  · 20 L breast core bx: Atypical 1 mm focus, highly suspicious for [facility], lobular features, the tumor does not histologically match the patient's prior axillary cancer, but could represent a small sample of the same tumor  · 20 L breast excisional bx: Subareolar lumpectomy, IDC, 1.6 cm, invasive tumor present at anterior and lateral margins, gr 2, + LVI, 1/1 LN involved, 1.1 cm, ER + at 48%, WI + at 72%, HER 2 POSITIVE (IHC 2+, FISH ratio 2, sig/cell 4.14)  · 20 [facility] genetic testing:  negative  · TCHP 2020-2020  · 2020 Bilateral mastectomy: Right breast: benign. Left breast: Inflammation, no residual carcinoma, 2/6 LNs (micromets in both, 1.7 mm, and 1.1 mm). pT1c ypN1mi cM0  · T-DM1 20-  · XRT 20-21 (pending)    History of Present Illness:   She noticed a L axilla mass in 2020, leading to the pathology above. Interval history:  In today for follow up and treatment. Complains of gr 1 fatigue, gr 1 anxiety, gr 2 mouth sores, gr 1 itching, gr 1 headache, gr 1 vaginal dryness. Complains of L eye irritation    FH:   No breast, ovarian, prostate, or pancreas cancer    LMP 2018    Past Medical History:   Diagnosis Date    Anxiety     Cancer [facility])     Essential hypertension     HX OTHER MEDICAL ,         Infertility     IVF with first pregnancy    Infertility, female     Joint pain     Psychiatric problem       Past Surgical History: Procedure Laterality Date    HX BILATERAL MASTECTOMY  2020    HX OTHER SURGICAL      [place] Teeth Removal    HX OTHER SURGICAL      1/2017 Excision of mole on toe with skin graph      Social History     Tobacco Use    Smoking status: Never Smoker    Smokeless tobacco: Never Used   Substance Use Topics    Alcohol use: Yes     Frequency: 4 or more times a week      Family History   Problem Relation Age of Onset    Diabetes Mother     Heart Disease Mother     Hypertension Mother     Hypertension Father     Cancer Paternal Grandmother         Lung cancer    Stroke Paternal Grandmother     Cancer Paternal Grandfather         Melanoma     Current Outpatient Medications   Medication Sig    carvediloL (COREG) 12.5 mg tablet Take 1 Tab by mouth two (2) times daily (with meals).  celecoxib (CELEBREX PO) Take  by mouth two (2) times a day.  acetaminophen (TYLENOL PO) Take  by mouth.  magic mouthwash solution Magic mouth wash   Maalox  Lidocaine 2% viscous   Diphenhydramine oral solution     Pharmacy to mix equal portions of ingredients to a total volume as indicated in the dispense amount. 10-15 ml swish/spit and may swallow for throat pain every 4 hours PRN.  doxycycline (VIBRAMYCIN) 100 mg capsule TAKE 1 CAPSULE BY MOUTH TWICE A DAY *INS LIMITS 30 DAY    prochlorperazine (COMPAZINE) 10 mg tablet Take 1 Tab by mouth every six (6) hours as needed for Nausea.  dexAMETHasone (DECADRON) 4 mg tablet 8 mg bid the day before and day after chemo    lidocaine-prilocaine (EMLA) topical cream Apply  to affected area as needed for Pain.  ondansetron (ZOFRAN ODT) 8 mg disintegrating tablet Take 1 Tab by mouth every eight (8) hours as needed for Nausea or Vomiting. For 2 days following chemo    ibuprofen (MOTRIN) 600 mg tablet Take 1 Tab by mouth every six (6) hours as needed for Pain.  sertraline (ZOLOFT) 25 mg tablet Take 25 mg by mouth daily.     PNV No.22-Iron Cbn&Gluc-FA-DOS 27-1-50 mg Tab Take  by mouth. Indications: PREGNANCY     No current facility-administered medications for this visit. Facility-Administered Medications Ordered in Other Visits   Medication Dose Route Frequency    sodium chloride (NS) flush 10 mL  10 mL IntraVENous PRN    0.9% sodium chloride injection 10 mL  10 mL IntraVENous PRN    heparin (porcine) pf 300-500 Units  300-500 Units InterCATHeter PRN      No Known Allergies     Review of Systems: A complete review of systems was obtained, negative except as described above. Physical Exam:     Visit Vitals  /85 (BP 1 Location: Left arm, BP Patient Position: Sitting)   Pulse 66   Temp 99 °F (37.2 °C) (Temporal)   Resp 18   Ht 5' 4\" (1.626 m)   Wt 127 lb (57.6 kg)   SpO2 98%   BMI 21.80 kg/m²     ECOG PS: 0    Constitutional: Appears well-developed and well-nourished in no apparent distress      Mental status: Alert and awake, Oriented to person/place/time, Able to follow commands    Eyes: EOM normal, Sclera normal, No visible ocular discharge    HENT: Normocephalic, atraumatic    Mouth/Throat: Moist mucous membranes    External Ears normal    Neck: No visualized mass    Pulmonary/Chest: Respiratory effort normal, No visualized signs of difficulty breathing or respiratory distress    Musculoskeletal: Normal gait with no signs of ataxia, Normal range of motion of neck    Neurological: No facial asymmetry (Cranial nerve 7 motor function), No gaze palsy    Skin: No significant exanthematous lesions or discoloration noted on facial skin    Psychiatric: Normal affect, No hallucinations         Results:     Lab Results   Component Value Date/Time    WBC 3.0 (L) 12/29/2020 10:46 AM    HGB 12.9 12/29/2020 10:46 AM    HCT 37.7 12/29/2020 10:46 AM    PLATELET 739 (L) 78/92/0696 10:46 AM    MCV 88.9 12/29/2020 10:46 AM    ABS.  NEUTROPHILS 1.8 12/29/2020 10:46 AM     Lab Results   Component Value Date/Time    Sodium 140 12/29/2020 10:46 AM    Potassium 3.4 (L) 12/29/2020 10:46 AM    Chloride 105 12/29/2020 10:46 AM    CO2 31 12/29/2020 10:46 AM    Glucose 139 (H) 12/29/2020 10:46 AM    BUN 11 12/29/2020 10:46 AM    Creatinine 0.69 12/29/2020 10:46 AM    GFR est AA >60 12/29/2020 10:46 AM    GFR est non-AA >60 12/29/2020 10:46 AM    Calcium 8.9 12/29/2020 10:46 AM     Lab Results   Component Value Date/Time    Bilirubin, total 0.5 12/29/2020 10:46 AM    ALT (SGPT) 47 12/29/2020 10:46 AM    Alk. phosphatase 72 12/29/2020 10:46 AM    Protein, total 7.5 12/29/2020 10:46 AM    Albumin 4.0 12/29/2020 10:46 AM    Globulin 3.5 12/29/2020 10:46 AM     5/20/20 breast  MRI  Subareolar region of L breast 1.2 cm mass, at least 2 LN on left, 3 cm largest  Right breast negative    5/20/20 bone scan  Heterogeneous activity in the ribs of uncertain significance. This would be an unusual presentation of bony metastatic disease    5/20/20 CT c/a/p  negative    Records reviewed and summarized above. Pathology report(s) reviewed above. Radiology report(s) reviewed above. Assessment/plan:   1.  L breast cancer, LN + by core, ER +, WI +, HER 2 POSITIVE, cT1c cN1a cM0:  Stage IIA, prognostic stage IB    We explained to the patient that the goal of systemic adjuvant therapy is to improve the chances for cure and decrease the risk of relapse. We explained why a patient can have microscopic cancer spread now even though physical examination, laboratory studies and imaging studies are negative for cancer. We explained that the same treatments used now as adjuvant or preventive treatments rarely if ever are curative in women who develop metastases. TTE on 5/28/2020 EF 64%, TTE on 8/10/2020, EF 60%, TTE on 9/11/2020, EF 61%, overall stable, next due 12/3/2020, ordered. IUD was removed by gyn. Bilateral mastectomies on 11/12/2020, no residual carcinoma in breast, 2/6 LNs (micromets). She had her last drain removed on 11/23/2020.   Believes she has some fluid that has accumulated, has follow up with Dr. Zoraida Kim today. She has seen Dr. Marielos Linder for [address] 12. Plan for CT sim on 12/10/2020, however this may be delayed due to limiting ROM. She has been referred to [facility] at [facility]. Discussed that with residual disease, will change from trastuzumab and pertuzumab to T-DM1 3.6 mg/kg IV q 3 weeks x 14 doses.  Side effects of T-DM1 include hepatotoxicity, pulmonary tox, thrombocytopenia, infusion reaction (rare), cardiotoxicity.  She is agreeable and has signed informed consent. Discussed neratinib 240 mg daily with food for one year after the completion of herceptin. Discussed that there was only a 2% DFS benefit and that benefit was largely driven in the ER + population. Discussed that there are no data in its use post-T-DM1. Will discuss again after T-DM1.     Discussed common side effects of neratinib including but not limited to diarrhea, nausea, abdominal pain, fatigue, vomiting, rash, swollen and sore mouth (stomatitis), decreased appetite, muscle spasms, indigestion (dyspepsia), liver damage (AST or ALT enzyme increase), nail disorder, dry skin, abdominal swelling (distention), weight loss and urinary tract infection. Will check FSH, LH, estradiol to check her menopausal status    2. Emotional well being:  She has excellent support and is coping well with her disease    3. Genetic testing:  discussed potential ramifications of a positive test including the potential need for bilateral mastectomies and bilateral oophorectomies and the risk then for her family members to also have a mutation. VUS also discussed. Tested in Dr. Lala Cao office on 5/21/20, negative    4. Loss of fertility:  Discussed potential loss of menses and fertility. She is not interested in having further children. Declines oncofertility consult    5. 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     6.  Decline in LV strain and HTN: 7.8% decline since initial TTE on 5/28/2020, started on Coreg 3.125 mg BID. Repeat TTE on 9/10/2020, EF 61%, stable, 12/2/2020, stable 60%, strain stable. Increased to coreg 12.5  mg bid last week. 7. Thrombocytopenia:  Due to chemo. Will monitor. T-DM1 may cause thrombocytopenia    8. Anemia:  resolved    9. L eye irritation:  Saw ophtho, due to dry eyes, will monitor    10. Leukopenia:  Likely due to T-DM1, will monitor          I appreciate the opportunity to participate in Ms. Lali Mulligan's care. Signed By: Nerissa Dickinson MD      No orders of the defined types were placed in this encounter.

## 2020-12-30 LAB — ESTRADIOL SERPL-MCNC: 8.6 PG/ML

## 2021-01-19 ENCOUNTER — HOSPITAL ENCOUNTER (OUTPATIENT)
Dept: INFUSION THERAPY | Age: 41
Discharge: HOME | End: 2021-01-19
Payer: COMMERCIAL

## 2021-01-19 ENCOUNTER — OFFICE VISIT (OUTPATIENT)
Dept: ONCOLOGY | Age: 41
End: 2021-01-19
Payer: COMMERCIAL

## 2021-01-19 VITALS
HEART RATE: 71 BPM | BODY MASS INDEX: 21.34 KG/M2 | SYSTOLIC BLOOD PRESSURE: 136 MMHG | OXYGEN SATURATION: 99 % | WEIGHT: 125 LBS | TEMPERATURE: 99.6 F | HEIGHT: 64 IN | DIASTOLIC BLOOD PRESSURE: 84 MMHG | RESPIRATION RATE: 16 BRPM

## 2021-01-19 VITALS
HEART RATE: 63 BPM | BODY MASS INDEX: 21.48 KG/M2 | HEIGHT: 64 IN | RESPIRATION RATE: 16 BRPM | SYSTOLIC BLOOD PRESSURE: 131 MMHG | WEIGHT: 125.8 LBS | DIASTOLIC BLOOD PRESSURE: 77 MMHG | TEMPERATURE: 99.6 F

## 2021-01-19 DIAGNOSIS — Z17.0 MALIGNANT NEOPLASM OF CENTRAL PORTION OF LEFT BREAST IN FEMALE, ESTROGEN RECEPTOR POSITIVE (HCC): Primary | ICD-10-CM

## 2021-01-19 DIAGNOSIS — C50.912 STAGE II BREAST CANCER, LEFT (HCC): Primary | ICD-10-CM

## 2021-01-19 DIAGNOSIS — Z78.0 POSTMENOPAUSAL: ICD-10-CM

## 2021-01-19 DIAGNOSIS — C50.112 MALIGNANT NEOPLASM OF CENTRAL PORTION OF LEFT BREAST IN FEMALE, ESTROGEN RECEPTOR POSITIVE (HCC): Primary | ICD-10-CM

## 2021-01-19 DIAGNOSIS — Z51.11 CHEMOTHERAPY MANAGEMENT, ENCOUNTER FOR: ICD-10-CM

## 2021-01-19 DIAGNOSIS — Z79.899 ENCOUNTER FOR MONITORING CARDIOTOXIC DRUG THERAPY: ICD-10-CM

## 2021-01-19 DIAGNOSIS — Z51.81 ENCOUNTER FOR MONITORING CARDIOTOXIC DRUG THERAPY: ICD-10-CM

## 2021-01-19 LAB
ALBUMIN SERPL-MCNC: 4.2 G/DL (ref 3.5–5)
ALBUMIN/GLOB SERPL: 1.2 {RATIO} (ref 1.1–2.2)
ALP SERPL-CCNC: 72 U/L (ref 45–117)
ALT SERPL-CCNC: 32 U/L (ref 12–78)
ANION GAP SERPL CALC-SCNC: 3 MMOL/L (ref 5–15)
AST SERPL-CCNC: 31 U/L (ref 15–37)
BASOPHILS # BLD: 0 K/UL (ref 0–0.1)
BASOPHILS NFR BLD: 1 % (ref 0–1)
BILIRUB SERPL-MCNC: 0.5 MG/DL (ref 0.2–1)
BUN SERPL-MCNC: 11 MG/DL (ref 6–20)
BUN/CREAT SERPL: 15 (ref 12–20)
CALCIUM SERPL-MCNC: 9.2 MG/DL (ref 8.5–10.1)
CHLORIDE SERPL-SCNC: 104 MMOL/L (ref 97–108)
CO2 SERPL-SCNC: 31 MMOL/L (ref 21–32)
CREAT SERPL-MCNC: 0.72 MG/DL (ref 0.55–1.02)
DIFFERENTIAL METHOD BLD: ABNORMAL
EOSINOPHIL # BLD: 0.1 K/UL (ref 0–0.4)
EOSINOPHIL NFR BLD: 3 % (ref 0–7)
ERYTHROCYTE [DISTWIDTH] IN BLOOD BY AUTOMATED COUNT: 12.2 % (ref 11.5–14.5)
GLOBULIN SER CALC-MCNC: 3.4 G/DL (ref 2–4)
GLUCOSE SERPL-MCNC: 87 MG/DL (ref 65–100)
HCT VFR BLD AUTO: 37.2 % (ref 35–47)
HGB BLD-MCNC: 12.8 G/DL (ref 11.5–16)
IMM GRANULOCYTES # BLD AUTO: 0 K/UL (ref 0–0.04)
IMM GRANULOCYTES NFR BLD AUTO: 0 % (ref 0–0.5)
LYMPHOCYTES # BLD: 0.8 K/UL (ref 0.8–3.5)
LYMPHOCYTES NFR BLD: 23 % (ref 12–49)
MCH RBC QN AUTO: 29.9 PG (ref 26–34)
MCHC RBC AUTO-ENTMCNC: 34.4 G/DL (ref 30–36.5)
MCV RBC AUTO: 86.9 FL (ref 80–99)
MONOCYTES # BLD: 0.3 K/UL (ref 0–1)
MONOCYTES NFR BLD: 10 % (ref 5–13)
NEUTS SEG # BLD: 2.1 K/UL (ref 1.8–8)
NEUTS SEG NFR BLD: 63 % (ref 32–75)
NRBC # BLD: 0 K/UL (ref 0–0.01)
NRBC BLD-RTO: 0 PER 100 WBC
PLATELET # BLD AUTO: 137 K/UL (ref 150–400)
PMV BLD AUTO: 9.9 FL (ref 8.9–12.9)
POTASSIUM SERPL-SCNC: 3.4 MMOL/L (ref 3.5–5.1)
PROT SERPL-MCNC: 7.6 G/DL (ref 6.4–8.2)
RBC # BLD AUTO: 4.28 M/UL (ref 3.8–5.2)
RBC MORPH BLD: ABNORMAL
SODIUM SERPL-SCNC: 138 MMOL/L (ref 136–145)
WBC # BLD AUTO: 3.3 K/UL (ref 3.6–11)

## 2021-01-19 PROCEDURE — 96413 CHEMO IV INFUSION 1 HR: CPT

## 2021-01-19 PROCEDURE — 80053 COMPREHEN METABOLIC PANEL: CPT

## 2021-01-19 PROCEDURE — 77030016057 HC NDL HUBR APOL -B

## 2021-01-19 PROCEDURE — 85025 COMPLETE CBC W/AUTO DIFF WBC: CPT

## 2021-01-19 PROCEDURE — 96375 TX/PRO/DX INJ NEW DRUG ADDON: CPT

## 2021-01-19 PROCEDURE — 99215 OFFICE O/P EST HI 40 MIN: CPT | Performed by: INTERNAL MEDICINE

## 2021-01-19 PROCEDURE — 74011250636 HC RX REV CODE- 250/636: Performed by: INTERNAL MEDICINE

## 2021-01-19 PROCEDURE — 36415 COLL VENOUS BLD VENIPUNCTURE: CPT

## 2021-01-19 RX ORDER — ONDANSETRON 2 MG/ML
8 INJECTION INTRAMUSCULAR; INTRAVENOUS ONCE
Status: COMPLETED | OUTPATIENT
Start: 2021-01-19 | End: 2021-01-19

## 2021-01-19 RX ORDER — SODIUM CHLORIDE 9 MG/ML
10 INJECTION INTRAMUSCULAR; INTRAVENOUS; SUBCUTANEOUS AS NEEDED
Status: DISCONTINUED | OUTPATIENT
Start: 2021-01-19 | End: 2021-01-20 | Stop reason: HOSPADM

## 2021-01-19 RX ORDER — SODIUM CHLORIDE 9 MG/ML
25 INJECTION, SOLUTION INTRAVENOUS CONTINUOUS
Status: DISCONTINUED | OUTPATIENT
Start: 2021-01-19 | End: 2021-01-20 | Stop reason: HOSPADM

## 2021-01-19 RX ORDER — HEPARIN 100 UNIT/ML
300-500 SYRINGE INTRAVENOUS AS NEEDED
Status: DISCONTINUED | OUTPATIENT
Start: 2021-01-19 | End: 2021-01-20 | Stop reason: HOSPADM

## 2021-01-19 RX ORDER — SODIUM CHLORIDE 0.9 % (FLUSH) 0.9 %
10 SYRINGE (ML) INJECTION AS NEEDED
Status: DISCONTINUED | OUTPATIENT
Start: 2021-01-19 | End: 2021-01-20 | Stop reason: HOSPADM

## 2021-01-19 RX ORDER — ANASTROZOLE 1 MG/1
1 TABLET ORAL DAILY
Qty: 90 TAB | Refills: 3 | Status: SHIPPED | OUTPATIENT
Start: 2021-01-19 | End: 2022-02-10

## 2021-01-19 RX ADMIN — ONDANSETRON 8 MG: 2 INJECTION INTRAMUSCULAR; INTRAVENOUS at 13:16

## 2021-01-19 RX ADMIN — Medication 500 UNITS: at 14:28

## 2021-01-19 RX ADMIN — SODIUM CHLORIDE 10 ML: 9 INJECTION INTRAMUSCULAR; INTRAVENOUS; SUBCUTANEOUS at 14:28

## 2021-01-19 RX ADMIN — ADO-TRASTUZUMAB EMTANSINE 200 MG: 20 INJECTION, POWDER, LYOPHILIZED, FOR SOLUTION INTRAVENOUS at 13:56

## 2021-01-19 RX ADMIN — SODIUM CHLORIDE 25 ML/HR: 9 INJECTION, SOLUTION INTRAVENOUS at 13:00

## 2021-01-19 NOTE — PROGRESS NOTES
\[facility]\"" Progress Note    Date: 2021    Name: Lalit Hooper    MRN: [id number]         : 1980    Ms. Konstantin Cooper Arrived ambulatory and in no distress for C3D1 of Kadcyla Regimen. Assessment was completed, no acute issues at this time, no new complaints voiced. Right chest wall port accessed without difficulty, labs drawn & sent for processing. Chemotherapy Flowsheet 2021   Cycle C3D1   Date 2021   Drug / Regimen Kadcyla   Pre Meds given   Notes given       1115 Patient proceed to appointment with Dr. Jesusita Ariraga. Ms. Mulligan's vitals were reviewed. Visit Vitals  /84   Pulse 71   Temp 99.6 °F (37.6 °C)   Resp 16   Ht 5' 4\" (1.626 m)   Wt 57.1 kg (125 lb 12.8 oz)   BMI 21.59 kg/m²   1. Do you have any symptoms of COVID-19? SOB, coughing, fever, or generally not feeling well NO    2. Have you been exposed to COVID-19 recently? NO    3. Have you had any recent contact with family/friend that has a pending COVID test? NO  Lab results were obtained and reviewed. Recent Results (from the past 12 hour(s))   METABOLIC PANEL, COMPREHENSIVE    Collection Time: 21 11:14 AM   Result Value Ref Range    Sodium 138 136 - 145 mmol/L    Potassium 3.4 (L) 3.5 - 5.1 mmol/L    Chloride 104 97 - 108 mmol/L    CO2 31 21 - 32 mmol/L    Anion gap 3 (L) 5 - 15 mmol/L    Glucose 87 65 - 100 mg/dL    BUN 11 6 - 20 MG/DL    Creatinine 0.72 0.55 - 1.02 MG/DL    BUN/Creatinine ratio 15 12 - 20      GFR est AA >60 >60 ml/min/1.73m2    GFR est non-AA >60 >60 ml/min/1.73m2    Calcium 9.2 8.5 - 10.1 MG/DL    Bilirubin, total 0.5 0.2 - 1.0 MG/DL    ALT (SGPT) 32 12 - 78 U/L    AST (SGOT) 31 15 - 37 U/L    Alk.  phosphatase 72 45 - 117 U/L    Protein, total 7.6 6.4 - 8.2 g/dL    Albumin 4.2 3.5 - 5.0 g/dL    Globulin 3.4 2.0 - 4.0 g/dL    A-G Ratio 1.2 1.1 - 2.2     CBC WITH AUTOMATED DIFF    Collection Time: 21 11:49 AM   Result Value Ref Range    WBC 3.3 (L) 3.6 - 11.0 K/uL    RBC 4.28 3.80 - 5.20 M/uL    HGB 12.8 11.5 - 16.0 g/dL    HCT 37.2 35.0 - 47.0 %    MCV 86.9 80.0 - 99.0 FL    MCH 29.9 26.0 - 34.0 PG    MCHC 34.4 30.0 - 36.5 g/dL    RDW 12.2 11.5 - 14.5 %    PLATELET 748 (L) 823 - 400 K/uL    MPV 9.9 8.9 - 12.9 FL    NRBC 0.0 0  WBC    ABSOLUTE NRBC 0.00 0.00 - 0.01 K/uL    NEUTROPHILS 63 32 - 75 %    LYMPHOCYTES 23 12 - 49 %    MONOCYTES 10 5 - 13 %    EOSINOPHILS 3 0 - 7 %    BASOPHILS 1 0 - 1 %    IMMATURE GRANULOCYTES 0 0.0 - 0.5 %    ABS. NEUTROPHILS 2.1 1.8 - 8.0 K/UL    ABS. LYMPHOCYTES 0.8 0.8 - 3.5 K/UL    ABS. MONOCYTES 0.3 0.0 - 1.0 K/UL    ABS. EOSINOPHILS 0.1 0.0 - 0.4 K/UL    ABS. BASOPHILS 0.0 0.0 - 0.1 K/UL    ABS. IMM. GRANS. 0.0 0.00 - 0.04 K/UL    DF SMEAR SCANNED      RBC COMMENTS NORMOCYTIC, NORMOCHROMIC         Medications:  Medications Administered     0.9% sodium chloride infusion     Admin Date  01/19/2021 Action  New Bag Dose  25 mL/hr Rate  25 mL/hr Route  IntraVENous Administered By  Raquel Alegria RN          0.9% sodium chloride injection 10 mL     Admin Date  01/19/2021 Action  Given Dose  10 mL Route  IntraVENous Administered By  Raquel Alegria RN          ADO-trastuzumab emtansine (KADCYLA) 200 mg in 0.9% sodium chloride 250 mL, overfill volume 25 mL Chemo infusion     Admin Date  01/19/2021 Action  New Bag Dose  200 mg Rate  570 mL/hr Route  IntraVENous Administered By  Raquel Alegria RN          heparin (porcine) pf 300-500 Units     Admin Date  01/19/2021 Action  Given Dose  500 Units Route  InterCATHeter Administered By  Raquel Alegria RN          ondansetron [facility]) injection 8 mg     Admin Date  01/19/2021 Action  Given Dose  8 mg Route  IntraVENous Administered By  Raquel Alegria RN                  Ms. Cesar Mandril tolerated treatment well and was discharged from [place] [address] in stable condition. Port de-accessed, flushed & heparinized per protocol. She is to return on 2/9/21  for her next appointment.     Majo Stover RN  January 19, 2021

## 2021-01-19 NOTE — PROGRESS NOTES
Tera Lanza is a 38 y.o. female Follow up for the evaluation of breast cancer. 1. Have you been to the ER, urgent care clinic since your last visit? Hospitalized since your last visit? No    2. Have you seen or consulted any other health care providers outside of the [address] [place] [address] since your last visit? Include any pap smears or colon screening.  No

## 2021-01-19 NOTE — PROGRESS NOTES
[facility] [place] [address] [address] [facility], [address] [facility]  W: [phone]  F: [phone]      Reason for Visit:   Wei Olivas is a 38 y.o. female who is seen in consultation at the request of Dr. Beth Kirby for evaluation of therapy for breast cancer    Plastic surg:  Dr. Jeannie Kanner onc:  Dr. Cory Lentz    Treatment History:   · 20 left breast ax core bx: Adenocarcinoma, ER + at 86% ; LA + at 69%; HER 2 POSITIVE (IHC 2+, FISH ratio 3.2; sig/cell 6.08), ki67 29%  · 20 L breast core bx: Atypical 1 mm focus, highly suspicious for ACUITY [facility], lobular features, the tumor does not histologically match the patient's prior axillary cancer, but could represent a small sample of the same tumor  · 20 L breast excisional bx: Subareolar lumpectomy, IDC, 1.6 cm, invasive tumor present at anterior and lateral margins, gr 2, + LVI, 1/1 LN involved, 1.1 cm, ER + at 48%, LA + at 72%, HER 2 POSITIVE (IHC 2+, FISH ratio 2, sig/cell 4.14)  · 20 Tgjewel Castillo genetic testing:  negative  · T[facility]P 2020-2020  · 2020 Bilateral mastectomy: Right breast: benign. Left breast: Inflammation, no residual carcinoma, 2/6 LNs (micromets in both, 1.7 mm, and 1.1 mm). pT1c ypN1mi cM0  · T-DM1 20-  · XRT 20-21 (pending)    History of Present Illness:   She noticed a L axilla mass in 2020, leading to the pathology above. Interval history:  In today for follow up and treatment. Complains of gr 1 fatigue, gr 1 hot flashes, gr 1 mucositis, gr 1 itching, gr 1 vaginal dryness.      FH:  No breast, ovarian, prostate, or pancreas cancer    LMP 2018    Past Medical History:   Diagnosis Date    Anxiety     Cancer [facility])     Essential hypertension     HX OTHER MEDICAL ,         Infertility     IVF with first pregnancy    Infertility, female     Joint pain     Psychiatric problem       Past Surgical History:   Procedure Laterality Date    HX BILATERAL MASTECTOMY  2020    HX OTHER SURGICAL      [place] Teeth Removal    HX OTHER SURGICAL      1/2017 Excision of mole on toe with skin graph      Social History     Tobacco Use    Smoking status: Never Smoker    Smokeless tobacco: Never Used   Substance Use Topics    Alcohol use: Yes     Frequency: 4 or more times a week      Family History   Problem Relation Age of Onset    Diabetes Mother     Heart Disease Mother     Hypertension Mother     Hypertension Father     Cancer Paternal Grandmother         Lung cancer    Stroke Paternal Grandmother     Cancer Paternal Grandfather         Melanoma     Current Outpatient Medications   Medication Sig    carvediloL (COREG) 12.5 mg tablet Take 1 Tab by mouth two (2) times daily (with meals).  celecoxib (CELEBREX PO) Take  by mouth two (2) times a day.  acetaminophen (TYLENOL PO) Take  by mouth.  magic mouthwash solution Magic mouth wash   Maalox  Lidocaine 2% viscous   Diphenhydramine oral solution     Pharmacy to mix equal portions of ingredients to a total volume as indicated in the dispense amount. 10-15 ml swish/spit and may swallow for throat pain every 4 hours PRN.  doxycycline (VIBRAMYCIN) 100 mg capsule TAKE 1 CAPSULE BY MOUTH TWICE A DAY *INS LIMITS 30 DAY    prochlorperazine (COMPAZINE) 10 mg tablet Take 1 Tab by mouth every six (6) hours as needed for Nausea.  dexAMETHasone (DECADRON) 4 mg tablet 8 mg bid the day before and day after chemo    lidocaine-prilocaine (EMLA) topical cream Apply  to affected area as needed for Pain.  ondansetron (ZOFRAN ODT) 8 mg disintegrating tablet Take 1 Tab by mouth every eight (8) hours as needed for Nausea or Vomiting. For 2 days following chemo    ibuprofen (MOTRIN) 600 mg tablet Take 1 Tab by mouth every six (6) hours as needed for Pain.  sertraline (ZOLOFT) 25 mg tablet Take 25 mg by mouth daily.  PNV No.22-Iron Cbn&Gluc-FA-DOS 27-1-50 mg Tab Take  by mouth.     Indications: PREGNANCY No current facility-administered medications for this visit. No Known Allergies     Review of Systems: A complete review of systems was obtained, negative except as described above. Physical Exam:     Visit Vitals  /84 (BP 1 Location: Left arm, BP Patient Position: Sitting)   Pulse 71   Temp 99.6 °F (37.6 °C) (Temporal)   Resp 16   Ht 5' 4\" (1.626 m)   Wt 125 lb (56.7 kg)   SpO2 99%   BMI 21.46 kg/m²     ECOG PS: 0    Constitutional: Appears well-developed and well-nourished in no apparent distress      Mental status: Alert and awake, Oriented to person/place/time, Able to follow commands    Eyes: EOM normal, Sclera normal, No visible ocular discharge    HENT: Normocephalic, atraumatic    Mouth/Throat: Moist mucous membranes    External Ears normal    Neck: No visualized mass    Pulmonary/Chest: Respiratory effort normal, No visualized signs of difficulty breathing or respiratory distress    Musculoskeletal: Normal gait with no signs of ataxia, Normal range of motion of neck    Neurological: No facial asymmetry (Cranial nerve 7 motor function), No gaze palsy    Skin: No significant exanthematous lesions or discoloration noted on facial skin    Psychiatric: Normal affect, No hallucinations         Results:     Lab Results   Component Value Date/Time    WBC 3.3 (L) 01/19/2021 11:49 AM    HGB 12.8 01/19/2021 11:49 AM    HCT 37.2 01/19/2021 11:49 AM    PLATELET 410 (L) 08/48/2674 11:49 AM    MCV 86.9 01/19/2021 11:49 AM    ABS.  NEUTROPHILS 2.1 01/19/2021 11:49 AM     Lab Results   Component Value Date/Time    Sodium 138 01/19/2021 11:14 AM    Potassium 3.4 (L) 01/19/2021 11:14 AM    Chloride 104 01/19/2021 11:14 AM    CO2 31 01/19/2021 11:14 AM    Glucose 87 01/19/2021 11:14 AM    BUN 11 01/19/2021 11:14 AM    Creatinine 0.72 01/19/2021 11:14 AM    GFR est AA >60 01/19/2021 11:14 AM    GFR est non-AA >60 01/19/2021 11:14 AM    Calcium 9.2 01/19/2021 11:14 AM     Lab Results   Component Value Date/Time Bilirubin, total 0.5 01/19/2021 11:14 AM    ALT (SGPT) 32 01/19/2021 11:14 AM    Alk. phosphatase 72 01/19/2021 11:14 AM    Protein, total 7.6 01/19/2021 11:14 AM    Albumin 4.2 01/19/2021 11:14 AM    Globulin 3.4 01/19/2021 11:14 AM     5/20/20 breast  MRI  Subareolar region of L breast 1.2 cm mass, at least 2 LN on left, 3 cm largest  Right breast negative    5/20/20 bone scan  Heterogeneous activity in the ribs of uncertain significance. This would be an unusual presentation of bony metastatic disease    5/20/20 CT c/a/p  negative    12/26/20 estradiol 8.6  FSH 95  LH 38.5    Records reviewed and summarized above. Pathology report(s) reviewed above. Radiology report(s) reviewed above. Assessment/plan:   1.  L breast cancer, LN + by core, ER +, [place] +, HER 2 POSITIVE, cT1c cN1a cM0:  Stage IIA, prognostic stage IB    We explained to the patient that the goal of systemic adjuvant therapy is to improve the chances for cure and decrease the risk of relapse. We explained why a patient can have microscopic cancer spread now even though physical examination, laboratory studies and imaging studies are negative for cancer. We explained that the same treatments used now as adjuvant or preventive treatments rarely if ever are curative in women who develop metastases. TTE on 5/28/2020 EF 64%, TTE on 8/10/2020, EF 60%, TTE on 9/11/2020, EF 61%, overall stable, TTE on 12/2/2020, EF 60%, GLS stable. IUD was removed by gyn. Bilateral mastectomies on 11/12/2020, no residual carcinoma in breast, 2/6 LNs (micromets). She had her last drain removed on 11/23/2020. Believes she has some fluid that has accumulated, has follow up with Dr. Ibrahima Seaman today. She has seen Dr. Estelle Luna for Yamilet 12. Plan for CT sim on 12/10/2020, however this may be delayed due to limiting ROM. She has been referred to lymphedema at [facility].      Discussed that with residual disease, will change from trastuzumab and pertuzumab to T-DM1 3.6 mg/kg IV q 3 weeks x 14 doses.  Side effects of T-DM1 include hepatotoxicity, pulmonary tox, thrombocytopenia, infusion reaction (rare), cardiotoxicity.  She is agreeable and has signed informed consent. T-DM1 #3 today. Discussed neratinib 240 mg daily with food for one year after the completion of herceptin. Discussed that there was only a 2% DFS benefit and that benefit was largely driven in the ER + population. Discussed that there are no data in its use post-T-DM1. Will discuss again after T-DM1.     Discussed common side effects of neratinib including but not limited to diarrhea, nausea, abdominal pain, fatigue, vomiting, rash, swollen and sore mouth (stomatitis), decreased appetite, muscle spasms, indigestion (dyspepsia), liver damage (AST or ALT enzyme increase), nail disorder, dry skin, abdominal swelling (distention), weight loss and urinary tract infection. FSH, LH, estradiol on 12/29/2020, appears she is postmenopausal.    The risks and benefits of aromatase inhibitors (anastrozole, letrozole, and exemestane) were discussed in detail and the patient was informed of the following: Risks include the development of painful muscles and joints (arthralgia/myalgia) and bone loss. Muscle and joint pain can be severe but rarely result in any tissue damage; symptoms usually resolve in several weeks when the medication is stopped. Bone loss is common and a bone density test is recommended as a baseline and then yearly to every several years depending on initial results. The risk of fractures is increased by a few percent in patients taking these drugs, but careful monitoring of bone density and using bone protecting agents when indicated can minimize these risks. Unlike tamoxifen there is no increased risk of blood clots or endometrial cancer. AIs can cause or worsen vaginal dryness but women using these drugs should not use vaginal estrogen preparations for these symptoms.  AIs can also cause or increase hot flashes. Any other symptoms should be reported. Anastrozole 1 mg daily rx in, to start the week after she finishes XRT    2. Emotional well being:  She has excellent support and is coping well with her disease    3. Genetic testing:  discussed potential ramifications of a positive test including the potential need for bilateral mastectomies and bilateral oophorectomies and the risk then for her family members to also have a mutation. VUS also discussed. Tested in Dr. Sai Mckenna office on 5/21/20, negative    4. Loss of fertility:  Discussed potential loss of menses and fertility. She is not interested in having further children. Declines oncofertility consult    5. 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 6.  Decline in LV strain and HTN: 7.8% decline since initial TTE on 5/28/2020, started on Coreg 3.125 mg BID. Repeat TTE on 9/10/2020, EF 61%, stable, 12/2/2020, stable 60%, strain stable. Previously increased to coreg 12.5.       7. Thrombocytopenia:  Due to chemo. Will monitor. T-DM1 may cause thrombocytopenia    8. Anemia:  resolved    9. L eye irritation:  Saw ophtho, due to dry eyes, will monitor    10. Leukopenia:  Likely due to T-DM1, will monitor    I appreciate the opportunity to participate in Ms. Cody Mulligan's care. Signed By: Chantel Beard MD      No orders of the defined types were placed in this encounter.

## 2021-01-25 ENCOUNTER — TRANSCRIBE ORDER (OUTPATIENT)
Dept: REGISTRATION | Age: 41
End: 2021-01-25

## 2021-01-25 ENCOUNTER — HOSPITAL ENCOUNTER (OUTPATIENT)
Dept: MAMMOGRAPHY | Age: 41
Discharge: HOME | End: 2021-01-25
Attending: INTERNAL MEDICINE
Payer: COMMERCIAL

## 2021-01-25 DIAGNOSIS — Z78.0 MENOPAUSE: ICD-10-CM

## 2021-01-25 DIAGNOSIS — Z78.0 MENOPAUSE: Primary | ICD-10-CM

## 2021-01-25 DIAGNOSIS — Z78.0 POSTMENOPAUSAL: ICD-10-CM

## 2021-01-25 PROCEDURE — 77080 DXA BONE DENSITY AXIAL: CPT

## 2021-01-29 ENCOUNTER — TELEPHONE (OUTPATIENT)
Dept: ONCOLOGY | Age: 41
End: 2021-01-29

## 2021-01-29 NOTE — TELEPHONE ENCOUNTER
1/29/21 4:10 PM: Called patient and advised that Dr. Jesusita Arriaga had a question for her earlier but now has the answer so she may disregard the missed calls. Patient stated she received the [employer] message about her bone density study results and understands Dr. Jesusita Arriaga recommendations. Patient wanted to know when she should start taking anastrozole since she completed radiation on 1/27. Advised patient that per Dr. Kiana Campbell office note from 1/19/21, he recommends that she start anastrozole one week after completing radiation. Patient verbalized understanding and did not have any further questions at this time.

## 2021-02-01 RX ORDER — DIPHENHYDRAMINE HYDROCHLORIDE 50 MG/ML
50 INJECTION, SOLUTION INTRAMUSCULAR; INTRAVENOUS AS NEEDED
Status: CANCELLED
Start: 2021-02-09

## 2021-02-01 RX ORDER — DIPHENHYDRAMINE HYDROCHLORIDE 50 MG/ML
25 INJECTION, SOLUTION INTRAMUSCULAR; INTRAVENOUS AS NEEDED
Status: CANCELLED
Start: 2021-02-09

## 2021-02-01 RX ORDER — ACETAMINOPHEN 325 MG/1
650 TABLET ORAL AS NEEDED
Status: CANCELLED
Start: 2021-02-09

## 2021-02-01 RX ORDER — ALBUTEROL SULFATE 0.83 MG/ML
2.5 SOLUTION RESPIRATORY (INHALATION) AS NEEDED
Status: CANCELLED
Start: 2021-02-09

## 2021-02-01 RX ORDER — ONDANSETRON 2 MG/ML
8 INJECTION INTRAMUSCULAR; INTRAVENOUS AS NEEDED
Status: CANCELLED | OUTPATIENT
Start: 2021-02-09

## 2021-02-01 RX ORDER — HYDROCORTISONE SODIUM SUCCINATE 100 MG/2ML
100 INJECTION, POWDER, FOR SOLUTION INTRAMUSCULAR; INTRAVENOUS AS NEEDED
Status: CANCELLED | OUTPATIENT
Start: 2021-02-09

## 2021-02-01 RX ORDER — EPINEPHRINE 1 MG/ML
0.3 INJECTION, SOLUTION, CONCENTRATE INTRAVENOUS AS NEEDED
Status: CANCELLED | OUTPATIENT
Start: 2021-02-09

## 2021-02-09 ENCOUNTER — HOSPITAL ENCOUNTER (OUTPATIENT)
Dept: INFUSION THERAPY | Age: 41
Discharge: HOME | End: 2021-02-09
Payer: COMMERCIAL

## 2021-02-09 VITALS
WEIGHT: 129 LBS | RESPIRATION RATE: 15 BRPM | TEMPERATURE: 97 F | DIASTOLIC BLOOD PRESSURE: 75 MMHG | HEART RATE: 64 BPM | OXYGEN SATURATION: 95 % | SYSTOLIC BLOOD PRESSURE: 115 MMHG | HEIGHT: 64 IN | BODY MASS INDEX: 22.02 KG/M2

## 2021-02-09 DIAGNOSIS — C50.912 STAGE II BREAST CANCER, LEFT (HCC): Primary | ICD-10-CM

## 2021-02-09 LAB
ALBUMIN SERPL-MCNC: 4.1 G/DL (ref 3.5–5)
ALBUMIN/GLOB SERPL: 1.2 {RATIO} (ref 1.1–2.2)
ALP SERPL-CCNC: 67 U/L (ref 45–117)
ALT SERPL-CCNC: 26 U/L (ref 12–78)
ANION GAP SERPL CALC-SCNC: 4 MMOL/L (ref 5–15)
AST SERPL-CCNC: 25 U/L (ref 15–37)
BASO+EOS+MONOS # BLD AUTO: 0.4 K/UL (ref 0.2–1.2)
BASO+EOS+MONOS NFR BLD AUTO: 8 % (ref 3.2–16.9)
BILIRUB SERPL-MCNC: 0.5 MG/DL (ref 0.2–1)
BUN SERPL-MCNC: 10 MG/DL (ref 6–20)
BUN/CREAT SERPL: 16 (ref 12–20)
CALCIUM SERPL-MCNC: 9.2 MG/DL (ref 8.5–10.1)
CHLORIDE SERPL-SCNC: 104 MMOL/L (ref 97–108)
CO2 SERPL-SCNC: 31 MMOL/L (ref 21–32)
CREAT SERPL-MCNC: 0.62 MG/DL (ref 0.55–1.02)
DIFFERENTIAL METHOD BLD: ABNORMAL
ERYTHROCYTE [DISTWIDTH] IN BLOOD BY AUTOMATED COUNT: 13.7 % (ref 11.8–15.8)
GLOBULIN SER CALC-MCNC: 3.3 G/DL (ref 2–4)
GLUCOSE SERPL-MCNC: 100 MG/DL (ref 65–100)
HCT VFR BLD AUTO: 36.6 % (ref 35–47)
HGB BLD-MCNC: 12.5 G/DL (ref 11.5–16)
LYMPHOCYTES # BLD: 0.6 K/UL (ref 0.8–3.5)
LYMPHOCYTES NFR BLD: 11 % (ref 12–49)
MCH RBC QN AUTO: 29.7 PG (ref 26–34)
MCHC RBC AUTO-ENTMCNC: 34.2 G/DL (ref 30–36.5)
MCV RBC AUTO: 86.9 FL (ref 80–99)
NEUTS SEG # BLD: 4.5 K/UL (ref 1.8–8)
NEUTS SEG NFR BLD: 81 % (ref 32–75)
PLATELET # BLD AUTO: 109 K/UL (ref 150–400)
POTASSIUM SERPL-SCNC: 3.6 MMOL/L (ref 3.5–5.1)
PROT SERPL-MCNC: 7.4 G/DL (ref 6.4–8.2)
RBC # BLD AUTO: 4.21 M/UL (ref 3.8–5.2)
SODIUM SERPL-SCNC: 139 MMOL/L (ref 136–145)
WBC # BLD AUTO: 5.5 K/UL (ref 3.6–11)

## 2021-02-09 PROCEDURE — 36415 COLL VENOUS BLD VENIPUNCTURE: CPT

## 2021-02-09 PROCEDURE — 96413 CHEMO IV INFUSION 1 HR: CPT

## 2021-02-09 PROCEDURE — 74011250636 HC RX REV CODE- 250/636: Performed by: INTERNAL MEDICINE

## 2021-02-09 PROCEDURE — 96375 TX/PRO/DX INJ NEW DRUG ADDON: CPT

## 2021-02-09 PROCEDURE — 80053 COMPREHEN METABOLIC PANEL: CPT

## 2021-02-09 PROCEDURE — 85025 COMPLETE CBC W/AUTO DIFF WBC: CPT

## 2021-02-09 PROCEDURE — 77030016057 HC NDL HUBR APOL -B

## 2021-02-09 RX ORDER — SODIUM CHLORIDE 9 MG/ML
10 INJECTION INTRAMUSCULAR; INTRAVENOUS; SUBCUTANEOUS AS NEEDED
Status: ACTIVE | OUTPATIENT
Start: 2021-02-09 | End: 2021-02-09

## 2021-02-09 RX ORDER — HEPARIN 100 UNIT/ML
300-500 SYRINGE INTRAVENOUS AS NEEDED
Status: ACTIVE | OUTPATIENT
Start: 2021-02-09 | End: 2021-02-09

## 2021-02-09 RX ORDER — SODIUM CHLORIDE 9 MG/ML
25 INJECTION, SOLUTION INTRAVENOUS CONTINUOUS
Status: DISPENSED | OUTPATIENT
Start: 2021-02-09 | End: 2021-02-09

## 2021-02-09 RX ORDER — ONDANSETRON 2 MG/ML
8 INJECTION INTRAMUSCULAR; INTRAVENOUS ONCE
Status: COMPLETED | OUTPATIENT
Start: 2021-02-09 | End: 2021-02-09

## 2021-02-09 RX ORDER — SODIUM CHLORIDE 0.9 % (FLUSH) 0.9 %
10 SYRINGE (ML) INJECTION AS NEEDED
Status: DISPENSED | OUTPATIENT
Start: 2021-02-09 | End: 2021-02-09

## 2021-02-09 RX ADMIN — Medication 10 ML: at 10:25

## 2021-02-09 RX ADMIN — ONDANSETRON 8 MG: 2 INJECTION INTRAMUSCULAR; INTRAVENOUS at 11:25

## 2021-02-09 RX ADMIN — Medication 10 ML: at 12:50

## 2021-02-09 RX ADMIN — Medication 500 UNITS: at 12:50

## 2021-02-09 RX ADMIN — ADO-TRASTUZUMAB EMTANSINE 200 MG: 20 INJECTION, POWDER, LYOPHILIZED, FOR SOLUTION INTRAVENOUS at 12:13

## 2021-02-09 RX ADMIN — SODIUM CHLORIDE 25 ML/HR: 9 INJECTION, SOLUTION INTRAVENOUS at 11:25

## 2021-02-09 RX ADMIN — SODIUM CHLORIDE 10 ML: 9 INJECTION INTRAMUSCULAR; INTRAVENOUS; SUBCUTANEOUS at 10:24

## 2021-02-09 NOTE — PROGRESS NOTES
[facility] Progress Note    Date: 2021    Name: Linnea Moeller    MRN: [id number]         : 1980    Ms. Brady Soni Arrived ambulatory and in no distress for C4D1 of Kadcyla Regimen. Assessment was completed, no acute issues at this time, no new complaints voiced. Right chest wall port accessed without difficulty, labs drawn & sent for processing. Covid questionnaire completed. 1. Do you have any symptoms of COVID-19? SOB, coughing, fever, or generally not feeling well - No    2. Have you been exposed to COVID-19 recently? - No    3. Have you had any recent contact with family/friend that has a pending COVID test? - No      Ms. Mulligan's vitals were reviewed. Visit Vitals  /83   Pulse (!) 58   Temp (!) 96.7 °F (35.9 °C)   Resp 18   Ht 5' 4\" (1.626 m)   Wt 58.5 kg (129 lb)   SpO2 95%   BMI 22.14 kg/m²       Lab results were obtained and reviewed. Recent Results (from the past 12 hour(s))   CBC WITH 3 PART DIFF    Collection Time: 21 10:22 AM   Result Value Ref Range    WBC 5.5 3.6 - 11.0 K/uL    RBC 4.21 3.80 - 5.20 M/uL    HGB 12.5 11.5 - 16.0 g/dL    HCT 36.6 35.0 - 47.0 %    MCV 86.9 80.0 - 99.0 FL    MCH 29.7 26.0 - 34.0 PG    MCHC 34.2 30.0 - 36.5 g/dL    RDW 13.7 11.8 - 15.8 %    PLATELET 695 (L) 667 - 400 K/uL    NEUTROPHILS 81 (H) 32 - 75 %    MIXED CELLS 8 3.2 - 16.9 %    LYMPHOCYTES 11 (L) 12 - 49 %    ABS. NEUTROPHILS 4.5 1.8 - 8.0 K/UL    ABS. MIXED CELLS 0.4 0.2 - 1.2 K/uL    ABS.  LYMPHOCYTES 0.6 (L) 0.8 - 3.5 K/UL    DF AUTOMATED     METABOLIC PANEL, COMPREHENSIVE    Collection Time: 21 10:22 AM   Result Value Ref Range    Sodium 139 136 - 145 mmol/L    Potassium 3.6 3.5 - 5.1 mmol/L    Chloride 104 97 - 108 mmol/L    CO2 31 21 - 32 mmol/L    Anion gap 4 (L) 5 - 15 mmol/L    Glucose 100 65 - 100 mg/dL    BUN 10 6 - 20 MG/DL    Creatinine 0.62 0.55 - 1.02 MG/DL    BUN/Creatinine ratio 16 12 - 20      GFR est AA >60 >60 ml/min/1.73m2    GFR est non-AA >60 >60 ml/min/1.73m2    Calcium 9.2 8.5 - 10.1 MG/DL    Bilirubin, total 0.5 0.2 - 1.0 MG/DL    ALT (SGPT) 26 12 - 78 U/L    AST (SGOT) 25 15 - 37 U/L    Alk. phosphatase 67 45 - 117 U/L    Protein, total 7.4 6.4 - 8.2 g/dL    Albumin 4.1 3.5 - 5.0 g/dL    Globulin 3.3 2.0 - 4.0 g/dL    A-G Ratio 1.2 1.1 - 2.2         Medications:  Zofran IVP  Kadcyla IV      Pt politely declined post infusion monitoring. Ms. Verna Trinidad tolerated treatment well and was discharged from [place] [address] in stable condition. Port de-accessed, flushed & heparinized per protocol. She is to return on 3/2/21 for her next appointment.     Kike Sotelo RN  February 9, 2021

## 2021-02-12 RX ORDER — CIPROFLOXACIN 500 MG/1
500 TABLET ORAL 2 TIMES DAILY
Qty: 10 TAB | Refills: 0 | Status: SHIPPED | OUTPATIENT
Start: 2021-02-12 | End: 2021-02-17

## 2021-02-23 RX ORDER — DIPHENHYDRAMINE HYDROCHLORIDE 50 MG/ML
25 INJECTION, SOLUTION INTRAMUSCULAR; INTRAVENOUS AS NEEDED
Status: CANCELLED
Start: 2021-03-02

## 2021-02-23 RX ORDER — ALBUTEROL SULFATE 0.83 MG/ML
2.5 SOLUTION RESPIRATORY (INHALATION) AS NEEDED
Status: CANCELLED
Start: 2021-03-02

## 2021-02-23 RX ORDER — HYDROCORTISONE SODIUM SUCCINATE 100 MG/2ML
100 INJECTION, POWDER, FOR SOLUTION INTRAMUSCULAR; INTRAVENOUS AS NEEDED
Status: CANCELLED | OUTPATIENT
Start: 2021-03-02

## 2021-02-23 RX ORDER — ACETAMINOPHEN 325 MG/1
650 TABLET ORAL AS NEEDED
Status: CANCELLED
Start: 2021-03-02

## 2021-02-23 RX ORDER — EPINEPHRINE 1 MG/ML
0.3 INJECTION, SOLUTION, CONCENTRATE INTRAVENOUS AS NEEDED
Status: CANCELLED | OUTPATIENT
Start: 2021-03-02

## 2021-02-23 RX ORDER — ONDANSETRON 2 MG/ML
8 INJECTION INTRAMUSCULAR; INTRAVENOUS AS NEEDED
Status: CANCELLED | OUTPATIENT
Start: 2021-03-02

## 2021-02-23 RX ORDER — DIPHENHYDRAMINE HYDROCHLORIDE 50 MG/ML
50 INJECTION, SOLUTION INTRAMUSCULAR; INTRAVENOUS AS NEEDED
Status: CANCELLED
Start: 2021-03-02

## 2021-03-02 ENCOUNTER — OFFICE VISIT (OUTPATIENT)
Dept: ONCOLOGY | Age: 41
End: 2021-03-02
Payer: COMMERCIAL

## 2021-03-02 ENCOUNTER — HOSPITAL ENCOUNTER (OUTPATIENT)
Dept: INFUSION THERAPY | Age: 41
Discharge: HOME | End: 2021-03-02
Payer: COMMERCIAL

## 2021-03-02 VITALS
OXYGEN SATURATION: 99 % | BODY MASS INDEX: 22.28 KG/M2 | DIASTOLIC BLOOD PRESSURE: 89 MMHG | WEIGHT: 129.8 LBS | HEART RATE: 59 BPM | SYSTOLIC BLOOD PRESSURE: 124 MMHG | RESPIRATION RATE: 16 BRPM

## 2021-03-02 VITALS
RESPIRATION RATE: 16 BRPM | HEART RATE: 64 BPM | OXYGEN SATURATION: 96 % | HEIGHT: 64 IN | BODY MASS INDEX: 22.14 KG/M2 | DIASTOLIC BLOOD PRESSURE: 82 MMHG | TEMPERATURE: 96 F | SYSTOLIC BLOOD PRESSURE: 118 MMHG

## 2021-03-02 DIAGNOSIS — Z51.11 CHEMOTHERAPY MANAGEMENT, ENCOUNTER FOR: ICD-10-CM

## 2021-03-02 DIAGNOSIS — C50.112 MALIGNANT NEOPLASM OF CENTRAL PORTION OF LEFT BREAST IN FEMALE, ESTROGEN RECEPTOR POSITIVE (HCC): Primary | ICD-10-CM

## 2021-03-02 DIAGNOSIS — Z17.0 MALIGNANT NEOPLASM OF CENTRAL PORTION OF LEFT BREAST IN FEMALE, ESTROGEN RECEPTOR POSITIVE (HCC): Primary | ICD-10-CM

## 2021-03-02 DIAGNOSIS — Z78.0 POSTMENOPAUSAL: ICD-10-CM

## 2021-03-02 DIAGNOSIS — Z51.81 ENCOUNTER FOR MONITORING CARDIOTOXIC DRUG THERAPY: ICD-10-CM

## 2021-03-02 DIAGNOSIS — C50.912 STAGE II BREAST CANCER, LEFT (HCC): Primary | ICD-10-CM

## 2021-03-02 DIAGNOSIS — Z79.899 ENCOUNTER FOR MONITORING CARDIOTOXIC DRUG THERAPY: ICD-10-CM

## 2021-03-02 LAB
ALBUMIN SERPL-MCNC: 4 G/DL (ref 3.5–5)
ALBUMIN/GLOB SERPL: 1.1 {RATIO} (ref 1.1–2.2)
ALP SERPL-CCNC: 71 U/L (ref 45–117)
ALT SERPL-CCNC: 32 U/L (ref 12–78)
ANION GAP SERPL CALC-SCNC: 6 MMOL/L (ref 5–15)
AST SERPL-CCNC: 34 U/L (ref 15–37)
BASO+EOS+MONOS # BLD AUTO: 0.2 K/UL (ref 0.2–1.2)
BASO+EOS+MONOS NFR BLD AUTO: 7 % (ref 3.2–16.9)
BILIRUB SERPL-MCNC: 0.8 MG/DL (ref 0.2–1)
BUN SERPL-MCNC: 11 MG/DL (ref 6–20)
BUN/CREAT SERPL: 16 (ref 12–20)
CALCIUM SERPL-MCNC: 9.1 MG/DL (ref 8.5–10.1)
CHLORIDE SERPL-SCNC: 104 MMOL/L (ref 97–108)
CO2 SERPL-SCNC: 29 MMOL/L (ref 21–32)
CREAT SERPL-MCNC: 0.67 MG/DL (ref 0.55–1.02)
DIFFERENTIAL METHOD BLD: ABNORMAL
ERYTHROCYTE [DISTWIDTH] IN BLOOD BY AUTOMATED COUNT: 13.9 % (ref 11.8–15.8)
GLOBULIN SER CALC-MCNC: 3.5 G/DL (ref 2–4)
GLUCOSE SERPL-MCNC: 99 MG/DL (ref 65–100)
HCT VFR BLD AUTO: 35.9 % (ref 35–47)
HGB BLD-MCNC: 12.5 G/DL (ref 11.5–16)
LYMPHOCYTES # BLD: 0.7 K/UL (ref 0.8–3.5)
LYMPHOCYTES NFR BLD: 26 % (ref 12–49)
MCH RBC QN AUTO: 30.3 PG (ref 26–34)
MCHC RBC AUTO-ENTMCNC: 34.8 G/DL (ref 30–36.5)
MCV RBC AUTO: 86.9 FL (ref 80–99)
NEUTS SEG # BLD: 1.7 K/UL (ref 1.8–8)
NEUTS SEG NFR BLD: 67 % (ref 32–75)
PLATELET # BLD AUTO: 115 K/UL (ref 150–400)
POTASSIUM SERPL-SCNC: 3.5 MMOL/L (ref 3.5–5.1)
PROT SERPL-MCNC: 7.5 G/DL (ref 6.4–8.2)
RBC # BLD AUTO: 4.13 M/UL (ref 3.8–5.2)
SODIUM SERPL-SCNC: 139 MMOL/L (ref 136–145)
WBC # BLD AUTO: 2.6 K/UL (ref 3.6–11)

## 2021-03-02 PROCEDURE — 80053 COMPREHEN METABOLIC PANEL: CPT

## 2021-03-02 PROCEDURE — 85025 COMPLETE CBC W/AUTO DIFF WBC: CPT

## 2021-03-02 PROCEDURE — 74011250636 HC RX REV CODE- 250/636: Performed by: INTERNAL MEDICINE

## 2021-03-02 PROCEDURE — 99214 OFFICE O/P EST MOD 30 MIN: CPT | Performed by: INTERNAL MEDICINE

## 2021-03-02 PROCEDURE — 77030016057 HC NDL HUBR APOL -B

## 2021-03-02 PROCEDURE — 36415 COLL VENOUS BLD VENIPUNCTURE: CPT

## 2021-03-02 PROCEDURE — 96413 CHEMO IV INFUSION 1 HR: CPT

## 2021-03-02 PROCEDURE — 96375 TX/PRO/DX INJ NEW DRUG ADDON: CPT

## 2021-03-02 PROCEDURE — 74011000250 HC RX REV CODE- 250: Performed by: INTERNAL MEDICINE

## 2021-03-02 RX ORDER — SODIUM CHLORIDE 0.9 % (FLUSH) 0.9 %
10 SYRINGE (ML) INJECTION AS NEEDED
Status: DISPENSED | OUTPATIENT
Start: 2021-03-02 | End: 2021-03-02

## 2021-03-02 RX ORDER — HEPARIN 100 UNIT/ML
300-500 SYRINGE INTRAVENOUS AS NEEDED
Status: ACTIVE | OUTPATIENT
Start: 2021-03-02 | End: 2021-03-02

## 2021-03-02 RX ORDER — ONDANSETRON 2 MG/ML
8 INJECTION INTRAMUSCULAR; INTRAVENOUS ONCE
Status: COMPLETED | OUTPATIENT
Start: 2021-03-02 | End: 2021-03-02

## 2021-03-02 RX ORDER — SODIUM CHLORIDE 9 MG/ML
10 INJECTION INTRAMUSCULAR; INTRAVENOUS; SUBCUTANEOUS AS NEEDED
Status: ACTIVE | OUTPATIENT
Start: 2021-03-02 | End: 2021-03-02

## 2021-03-02 RX ORDER — SODIUM CHLORIDE 9 MG/ML
25 INJECTION, SOLUTION INTRAVENOUS CONTINUOUS
Status: DISCONTINUED | OUTPATIENT
Start: 2021-03-02 | End: 2021-03-03 | Stop reason: HOSPADM

## 2021-03-02 RX ADMIN — ADO-TRASTUZUMAB EMTANSINE 200 MG: 20 INJECTION, POWDER, LYOPHILIZED, FOR SOLUTION INTRAVENOUS at 13:44

## 2021-03-02 RX ADMIN — SODIUM CHLORIDE 25 ML/HR: 9 INJECTION, SOLUTION INTRAVENOUS at 12:49

## 2021-03-02 RX ADMIN — ONDANSETRON 8 MG: 2 INJECTION INTRAMUSCULAR; INTRAVENOUS at 12:49

## 2021-03-02 RX ADMIN — Medication 10 ML: at 14:20

## 2021-03-02 RX ADMIN — Medication 500 UNITS: at 14:20

## 2021-03-02 RX ADMIN — SODIUM CHLORIDE 10 ML: 9 INJECTION INTRAMUSCULAR; INTRAVENOUS; SUBCUTANEOUS at 10:45

## 2021-03-02 NOTE — PROGRESS NOTES
Michael Renteria is a 42 y.o. female Follow up for the evaluation of breast cancer. 1. Have you been to the ER, urgent care clinic since your last visit? Hospitalized since your last visit? No    2. Have you seen or consulted any other health care providers outside of the [address] [place] [address] since your last visit? Include any pap smears or colon screening.  No

## 2021-03-02 NOTE — PROGRESS NOTES
[facility]  [address] [facility], [address]  W: [phone]  F: [phone]      Reason for Visit:   Angel Howard is a 42 y.o. female who is seen in consultation at the request of Dr. Milena Sam for evaluation of therapy for breast cancer    Plastic surg:  Dr. Pily Chaudhary onc:  Dr. Africa Lal    Treatment History:   · 20 left breast ax core bx: Adenocarcinoma, ER + at 86% ; ND + at 69%; HER 2 POSITIVE (IHC 2+, FISH ratio 3.2; sig/cell 6.08), ki67 29%  · 20 L breast core bx: Atypical 1 mm focus, highly suspicious for [facility], lobular features, the tumor does not histologically match the patient's prior axillary cancer, but could represent a small sample of the same tumor  · 20 L breast excisional bx: Subareolar lumpectomy, IDC, 1.6 cm, invasive tumor present at anterior and lateral margins, gr 2, + LVI, 1/1 LN involved, 1.1 cm, ER + at 48%, ND + at 72%, HER 2 POSITIVE (IHC 2+, FISH ratio 2, sig/cell 4.14)  · 20 [employer] genetic testing:  negative  · TCHP 2020-2020  · 2020 Bilateral mastectomy: Right breast: benign. Left breast: Inflammation, no residual carcinoma, 2/6 LNs (micromets in both, 1.7 mm, and 1.1 mm). pT1c ypN1mi cM0  · T-DM1 20-  · XRT 20-21   · Anastrozole 2021-    History of Present Illness:   She noticed a L axilla mass in 2020, leading to the pathology above. Interval history:  In today for follow up and treatment. Complains of gr 1 fatigue, gr 2 hot flashes, gr 1 pain, 2/10 joint pain, gr 1 swelling, gr 1 vaginal dryness. First [id number] pfizer 21    Has joint pain from anastrozole, worst in right hand    History reviewed    FH:   No breast, ovarian, prostate, or pancreas cancer    LMP 2018    Past Medical History:   Diagnosis Date    Anxiety     Cancer [facility])     Essential hypertension     HX OTHER MEDICAL ,         Infertility     IVF with first pregnancy    Infertility, female     Joint pain     Psychiatric problem       Past Surgical History:   Procedure Laterality Date    HX BILATERAL MASTECTOMY  2020    HX OTHER SURGICAL      [place] Teeth Removal    HX OTHER SURGICAL      1/2017 Excision of mole on toe with skin graph      Social History     Tobacco Use    Smoking status: Never Smoker    Smokeless tobacco: Never Used   Substance Use Topics    Alcohol use: Yes     Frequency: 4 or more times a week      Family History   Problem Relation Age of Onset    Diabetes Mother     Heart Disease Mother     Hypertension Mother     Hypertension Father     Cancer Paternal Grandmother         Lung cancer    Stroke Paternal Grandmother     Cancer Paternal Grandfather         Melanoma     Current Outpatient Medications   Medication Sig    anastrozole (ARIMIDEX) 1 mg tablet Take 1 mg by mouth daily.  carvediloL (COREG) 12.5 mg tablet Take 1 Tab by mouth two (2) times daily (with meals).  acetaminophen (TYLENOL PO) Take  by mouth.  lidocaine-prilocaine (EMLA) topical cream Apply  to affected area as needed for Pain.  ondansetron (ZOFRAN ODT) 8 mg disintegrating tablet Take 1 Tab by mouth every eight (8) hours as needed for Nausea or Vomiting. For 2 days following chemo    ibuprofen (MOTRIN) 600 mg tablet Take 1 Tab by mouth every six (6) hours as needed for Pain.  sertraline (ZOLOFT) 25 mg tablet Take 25 mg by mouth daily.  PNV No.22-Iron Cbn&Gluc-FA-DOS 27-1-50 mg Tab Take  by mouth. Indications: PREGNANCY     No current facility-administered medications for this visit.       Facility-Administered Medications Ordered in Other Visits   Medication Dose Route Frequency    sodium chloride (NS) flush 10 mL  10 mL IntraVENous PRN    0.9% sodium chloride injection 10 mL  10 mL IntraVENous PRN    heparin (porcine) pf 300-500 Units  300-500 Units InterCATHeter PRN      No Known Allergies     Review of Systems: A complete review of systems was obtained, negative except as described above. Physical Exam:     Visit Vitals  /82   Pulse 64   Temp (!) 96 °F (35.6 °C) (Temporal)   Resp 16   Ht 5' 4\" (1.626 m)   SpO2 96%   BMI 22.14 kg/m²     ECOG PS: 0    Constitutional: Appears well-developed and well-nourished in no apparent distress      Mental status: Alert and awake, Oriented to person/place/time, Able to follow commands    Eyes: EOM normal, Sclera normal, No visible ocular discharge    HENT: Normocephalic, atraumatic    Mouth/Throat: Moist mucous membranes    External Ears normal    Neck: No visualized mass    Pulmonary/Chest: Respiratory effort normal, No visualized signs of difficulty breathing or respiratory distress    Musculoskeletal: Normal gait with no signs of ataxia, Normal range of motion of neck    Neurological: No facial asymmetry (Cranial nerve 7 motor function), No gaze palsy    Skin: No significant exanthematous lesions or discoloration noted on facial skin    Psychiatric: Normal affect, No hallucinations         Results:     Lab Results   Component Value Date/Time    WBC 2.6 (L) 03/02/2021 10:49 AM    HGB 12.5 03/02/2021 10:49 AM    HCT 35.9 03/02/2021 10:49 AM    PLATELET 160 (L) 51/80/1009 10:49 AM    MCV 86.9 03/02/2021 10:49 AM    ABS. NEUTROPHILS 1.7 (L) 03/02/2021 10:49 AM     Lab Results   Component Value Date/Time    Sodium 139 02/09/2021 10:22 AM    Potassium 3.6 02/09/2021 10:22 AM    Chloride 104 02/09/2021 10:22 AM    CO2 31 02/09/2021 10:22 AM    Glucose 100 02/09/2021 10:22 AM    BUN 10 02/09/2021 10:22 AM    Creatinine 0.62 02/09/2021 10:22 AM    GFR est AA >60 02/09/2021 10:22 AM    GFR est non-AA >60 02/09/2021 10:22 AM    Calcium 9.2 02/09/2021 10:22 AM     Lab Results   Component Value Date/Time    Bilirubin, total 0.5 02/09/2021 10:22 AM    ALT (SGPT) 26 02/09/2021 10:22 AM    Alk.  phosphatase 67 02/09/2021 10:22 AM    Protein, total 7.4 02/09/2021 10:22 AM    Albumin 4.1 02/09/2021 10:22 AM    Globulin 3.3 02/09/2021 10:22 AM     5/20/20 breast  MRI  Subareolar region of L breast 1.2 cm mass, at least 2 LN on left, 3 cm largest  Right breast negative    5/20/20 bone scan  Heterogeneous activity in the ribs of uncertain significance. This would be an unusual presentation of bony metastatic disease    5/20/20 CT c/a/p  negative    12/26/20 estradiol 8.6  FSH 95  LH 38.5    Records reviewed and summarized above. Pathology report(s) reviewed above. Radiology report(s) reviewed above. Assessment/plan:   1.  L breast cancer, LN + by core, ER +, WI +, HER 2 POSITIVE, cT1c cN1a cM0:  Stage IIA, prognostic stage IB    We explained to the patient that the goal of systemic adjuvant therapy is to improve the chances for cure and decrease the risk of relapse. We explained why a patient can have microscopic cancer spread now even though physical examination, laboratory studies and imaging studies are negative for cancer. We explained that the same treatments used now as adjuvant or preventive treatments rarely if ever are curative in women who develop metastases. TTE on 5/28/2020 EF 64%, TTE on 8/10/2020, EF 60%, TTE on 9/11/2020, EF 61%, overall stable, TTE on 12/2/2020, EF 60%, GLS stable. Next TTE ordered prior to next dose    Bilateral mastectomies on 11/12/2020, no residual carcinoma in breast, 2/6 LNs (micromets). Discussed that with residual disease, will change from trastuzumab and pertuzumab to T-DM1 3.6 mg/kg IV q 3 weeks x 14 doses.  Side effects of T-DM1 include hepatotoxicity, pulmonary tox, thrombocytopenia, infusion reaction (rare), cardiotoxicity.  She is agreeable and has signed informed consent. T-DM1 #5 today. Discussed neratinib 240 mg daily with food for one year after the completion of herceptin. Discussed that there was only a 2% DFS benefit and that benefit was largely driven in the ER + population. Discussed that there are no data in its use post-T-DM1.   Will discuss again after T-DM1.     Discussed common side effects of neratinib including but not limited to diarrhea, nausea, abdominal pain, fatigue, vomiting, rash, swollen and sore mouth (stomatitis), decreased appetite, muscle spasms, indigestion (dyspepsia), liver damage (AST or ALT enzyme increase), nail disorder, dry skin, abdominal swelling (distention), weight loss and urinary tract infection. FSH, LH, estradiol on 12/29/2020, appears she is postmenopausal.    Anastrozole 1 mg daily, continue    2. Emotional well being:  She has excellent support and is coping well with her disease; on zoloft 75 mg daily    3. Genetic testing:  discussed potential ramifications of a positive test including the potential need for bilateral mastectomies and bilateral oophorectomies and the risk then for her family members to also have a mutation. VUS also discussed. Tested in Dr. Cali Rose office on 5/21/20, negative    4. Loss of fertility:  Discussed potential loss of menses and fertility. She is not interested in having further children. Declines oncofertility consult    5. Neuropathy:  resolved    6. Decline in LV strain and HTN: 7.8% decline since initial TTE on 5/28/2020, started on Coreg 3.125 mg BID. Repeat TTE on 9/10/2020, EF 61%, stable, 12/2/2020, stable 60%, strain stable. Previously increased to coreg 12.5.       7. Thrombocytopenia:  Due to chemo. Will monitor. T-DM1 may cause thrombocytopenia    8. Leukopenia:  Likely due to T-DM1, will monitor    9. Joint pain:  Due to AI; discussed omega-3-FA 3.3 g/day; will also rx cymbalta 30 mg daily, but on cymbalta, so will hold on this for now    I appreciate the opportunity to participate in Ms. Indra Mulligan's care. Signed By: Dick Montoya MD      No orders of the defined types were placed in this encounter.

## 2021-03-02 NOTE — PROGRESS NOTES
[facility] Progress Note    Date: 2021    Name: Raciel Mcdermott    MRN: [id number]         : 1980    Ms. Lisa Leija Arrived ambulatory and in no distress for C5D1 of  Kadcyla Regimen. Assessment was completed, no acute issues at this time, no new complaints voiced. Right chest wall port accessed without difficulty, labs drawn & sent for processing. Covid Screen:  1. Do you have any symptoms of COVID-19? SOB, coughing, fever, or generally not feeling well NO    2. Have you been exposed to COVID-19 recently? NO    3. Have you had any recent contact with family/friend that has a pending COVID test? NO    Chemotherapy Flowsheet 3/2/2021   Cycle C5D1   Date 3/2/2021   Drug / Regimen Kadcyla   Pre Meds given   Notes given        Patient proceed to appointment with Dr. Arcelia Alex. Ms. Mulligan's vitals were reviewed. Visit Vitals  /89   Pulse (!) 59   Resp 16   Wt 58.9 kg (129 lb 12.8 oz)   SpO2 99%   Breastfeeding No   BMI 22.28 kg/m²       Lab results were obtained and reviewed. Recent Results (from the past 12 hour(s))   CBC WITH 3 PART DIFF    Collection Time: 21 10:49 AM   Result Value Ref Range    WBC 2.6 (L) 3.6 - 11.0 K/uL    RBC 4.13 3.80 - 5.20 M/uL    HGB 12.5 11.5 - 16.0 g/dL    HCT 35.9 35.0 - 47.0 %    MCV 86.9 80.0 - 99.0 FL    MCH 30.3 26.0 - 34.0 PG    MCHC 34.8 30.0 - 36.5 g/dL    RDW 13.9 11.8 - 15.8 %    PLATELET 808 (L) 991 - 400 K/uL    NEUTROPHILS 67 32 - 75 %    MIXED CELLS 7 3.2 - 16.9 %    LYMPHOCYTES 26 12 - 49 %    ABS. NEUTROPHILS 1.7 (L) 1.8 - 8.0 K/UL    ABS. MIXED CELLS 0.2 0.2 - 1.2 K/uL    ABS.  LYMPHOCYTES 0.7 (L) 0.8 - 3.5 K/UL    DF AUTOMATED     METABOLIC PANEL, COMPREHENSIVE    Collection Time: 21 10:49 AM   Result Value Ref Range    Sodium 139 136 - 145 mmol/L    Potassium 3.5 3.5 - 5.1 mmol/L    Chloride 104 97 - 108 mmol/L    CO2 29 21 - 32 mmol/L    Anion gap 6 5 - 15 mmol/L    Glucose 99 65 - 100 mg/dL    BUN 11 6 - 20 MG/DL    Creatinine 0.67 0.55 - 1.02 MG/DL    BUN/Creatinine ratio 16 12 - 20      GFR est AA >60 >60 ml/min/1.73m2    GFR est non-AA >60 >60 ml/min/1.73m2    Calcium 9.1 8.5 - 10.1 MG/DL    Bilirubin, total 0.8 0.2 - 1.0 MG/DL    ALT (SGPT) 32 12 - 78 U/L    AST (SGOT) 34 15 - 37 U/L    Alk. phosphatase 71 45 - 117 U/L    Protein, total 7.5 6.4 - 8.2 g/dL    Albumin 4.0 3.5 - 5.0 g/dL    Globulin 3.5 2.0 - 4.0 g/dL    A-G Ratio 1.1 1.1 - 2.2         Medications:  Medications Administered     0.9% sodium chloride infusion     Admin Date  03/02/2021 Action  New Bag Dose  25 mL/hr Rate  25 mL/hr Route  IntraVENous Administered By  [facility]          0.9% sodium chloride injection 10 mL     Admin Date  03/02/2021 Action  Given Dose  10 mL Route  IntraVENous Administered By  [facility]          ADO-trastuzumab emtansine (KADCYLA) 200 mg in 0.9% sodium chloride 250 mL, overfill volume 25 mL Chemo infusion     Admin Date  03/02/2021 Action  New Bag Dose  200 mg Rate  570 mL/hr Route  IntraVENous Administered By  [facility]          heparin (porcine) pf 300-500 Units     Admin Date  03/02/2021 Action  Given Dose  500 Units Route  InterCATHeter Administered By  [facility]          [facility]etron [facility]) injection 8 mg     Admin Date  03/02/2021 Action  Given Dose  8 mg Route  IntraVENous Administered By  [facility]          sodium chloride (NS) flush 10 mL     Admin Date  03/02/2021 Action  Given Dose  10 mL Route  IntraVENous Administered By  Jaison Oh                  Ms. Dale Jones tolerated treatment well and was discharged from [place] [address] in stable condition at 1430. Port de-accessed, flushed & heparinized per protocol. She is to return on March 23 at 1000 for her next appointment.     [facility]  March 2, 2021

## 2021-03-08 ENCOUNTER — ANCILLARY PROCEDURE (OUTPATIENT)
Dept: CARDIOLOGY CLINIC | Age: 41
End: 2021-03-08
Payer: COMMERCIAL

## 2021-03-08 VITALS
BODY MASS INDEX: 23.74 KG/M2 | HEIGHT: 62 IN | SYSTOLIC BLOOD PRESSURE: 124 MMHG | DIASTOLIC BLOOD PRESSURE: 82 MMHG | WEIGHT: 129 LBS

## 2021-03-08 DIAGNOSIS — Z17.0 MALIGNANT NEOPLASM OF CENTRAL PORTION OF LEFT BREAST IN FEMALE, ESTROGEN RECEPTOR POSITIVE (HCC): ICD-10-CM

## 2021-03-08 DIAGNOSIS — C50.112 MALIGNANT NEOPLASM OF CENTRAL PORTION OF LEFT BREAST IN FEMALE, ESTROGEN RECEPTOR POSITIVE (HCC): ICD-10-CM

## 2021-03-08 PROCEDURE — 93306 TTE W/DOPPLER COMPLETE: CPT | Performed by: INTERNAL MEDICINE

## 2021-03-09 RX ORDER — CARVEDILOL 12.5 MG/1
TABLET ORAL
Qty: 180 TAB | Refills: 3 | Status: SHIPPED | OUTPATIENT
Start: 2021-03-09 | End: 2021-07-06

## 2021-03-10 LAB
ECHO AO ASC DIAM: 2.81 CM
ECHO AO ROOT DIAM: 2.9 CM
ECHO AV AREA PEAK VELOCITY: 2.51 CM2
ECHO AV AREA VTI: 2.43 CM2
ECHO AV AREA/BSA PEAK VELOCITY: 1.6 CM2/M2
ECHO AV AREA/BSA VTI: 1.5 CM2/M2
ECHO AV MEAN GRADIENT: 3.14 MMHG
ECHO AV PEAK GRADIENT: 6.19 MMHG
ECHO AV PEAK VELOCITY: 124.37 CM/S
ECHO AV VTI: 27.36 CM
ECHO EST RA PRESSURE: 3 MMHG
ECHO IVC PROX: 2.05 CM
ECHO LA AREA 4C: 17.79 CM2
ECHO LA MAJOR AXIS: 3.84 CM
ECHO LA MINOR AXIS: 2.42 CM
ECHO LA VOL 2C: 65.16 ML (ref 22–52)
ECHO LA VOL 4C: 51.07 ML (ref 22–52)
ECHO LA VOL BP: 63.84 ML (ref 22–52)
ECHO LA VOL/BSA BIPLANE: 40.24 ML/M2 (ref 16–28)
ECHO LA VOLUME INDEX A2C: 41.07 ML/M2 (ref 16–28)
ECHO LA VOLUME INDEX A4C: 32.19 ML/M2 (ref 16–28)
ECHO LV E' LATERAL VELOCITY: 11.08 CM/S
ECHO LV E' SEPTAL VELOCITY: 9.31 CM/S
ECHO LV EDV A2C: 91.55 ML
ECHO LV EDV A4C: 101.23 ML
ECHO LV EDV BP: 96.96 ML (ref 56–104)
ECHO LV EDV INDEX A4C: 63.8 ML/M2
ECHO LV EDV INDEX BP: 61.1 ML/M2
ECHO LV EDV NDEX A2C: 57.7 ML/M2
ECHO LV EJECTION FRACTION A2C: 59 PERCENT
ECHO LV EJECTION FRACTION A4C: 65 PERCENT
ECHO LV EJECTION FRACTION BIPLANE: 61.8 PERCENT (ref 55–100)
ECHO LV ESV A2C: 37.44 ML
ECHO LV ESV A4C: 35.93 ML
ECHO LV ESV BP: 37.09 ML (ref 19–49)
ECHO LV ESV INDEX A2C: 23.6 ML/M2
ECHO LV ESV INDEX A4C: 22.6 ML/M2
ECHO LV ESV INDEX BP: 23.4 ML/M2
ECHO LV GLOBAL LONGITUDINAL STRAIN (GLS): -20.1 PERCENT
ECHO LV INTERNAL DIMENSION DIASTOLIC: 4.87 CM (ref 3.9–5.3)
ECHO LV INTERNAL DIMENSION SYSTOLIC: 3.18 CM
ECHO LV IVSD: 0.71 CM (ref 0.6–0.9)
ECHO LV MASS 2D: 120.6 G (ref 67–162)
ECHO LV MASS INDEX 2D: 76 G/M2 (ref 43–95)
ECHO LV POSTERIOR WALL DIASTOLIC: 0.8 CM (ref 0.6–0.9)
ECHO LVOT DIAM: 2.02 CM
ECHO LVOT PEAK GRADIENT: 3.8 MMHG
ECHO LVOT PEAK VELOCITY: 97.47 CM/S
ECHO LVOT SV: 66.5 ML
ECHO LVOT VTI: 20.75 CM
ECHO MV A VELOCITY: 61.62 CM/S
ECHO MV E DECELERATION TIME (DT): 172.41 MS
ECHO MV E VELOCITY: 98.92 CM/S
ECHO MV E/A RATIO: 1.61
ECHO MV E/E' LATERAL: 8.93
ECHO MV E/E' RATIO (AVERAGED): 9.78
ECHO MV E/E' SEPTAL: 10.63
ECHO MV MAX VELOCITY: 115.59 CM/S
ECHO MV MEAN GRADIENT: 1.87 MMHG
ECHO MV PEAK GRADIENT: 5.34 MMHG
ECHO MV PRESSURE HALF TIME (PHT): 50 MS
ECHO MV VTI: 32.17 CM
ECHO RA AREA 4C: 15.03 CM2
ECHO RIGHT VENTRICULAR SYSTOLIC PRESSURE (RVSP): 18.85 MMHG
ECHO RV INTERNAL DIMENSION: 3.9 CM
ECHO RV TAPSE: 2.36 CM (ref 1.5–2)
ECHO TV REGURGITANT MAX VELOCITY: 199.05 CM/S
ECHO TV REGURGITANT PEAK GRADIENT: 15.85 MMHG
GLOBAL LONGITUDINAL STRAIN 2 CHAMBER: -21.6 PERCENT
GLOBAL LONGITUDINAL STRAIN 4 CHAMBER: -21.3 PERCENT
GLOBAL LONGITUDINAL STRAIN LONG AXIS: -17.2 PERCENT

## 2021-03-17 RX ORDER — ALBUTEROL SULFATE 0.83 MG/ML
2.5 SOLUTION RESPIRATORY (INHALATION) AS NEEDED
Status: CANCELLED
Start: 2021-03-23

## 2021-03-17 RX ORDER — ACETAMINOPHEN 325 MG/1
650 TABLET ORAL AS NEEDED
Status: CANCELLED
Start: 2021-03-23

## 2021-03-17 RX ORDER — DIPHENHYDRAMINE HYDROCHLORIDE 50 MG/ML
50 INJECTION, SOLUTION INTRAMUSCULAR; INTRAVENOUS AS NEEDED
Status: CANCELLED
Start: 2021-03-23

## 2021-03-17 RX ORDER — DIPHENHYDRAMINE HYDROCHLORIDE 50 MG/ML
25 INJECTION, SOLUTION INTRAMUSCULAR; INTRAVENOUS AS NEEDED
Status: CANCELLED
Start: 2021-03-23

## 2021-03-17 RX ORDER — EPINEPHRINE 1 MG/ML
0.3 INJECTION, SOLUTION, CONCENTRATE INTRAVENOUS AS NEEDED
Status: CANCELLED | OUTPATIENT
Start: 2021-03-23

## 2021-03-17 RX ORDER — HYDROCORTISONE SODIUM SUCCINATE 100 MG/2ML
100 INJECTION, POWDER, FOR SOLUTION INTRAMUSCULAR; INTRAVENOUS AS NEEDED
Status: CANCELLED | OUTPATIENT
Start: 2021-03-23

## 2021-03-17 RX ORDER — ONDANSETRON 2 MG/ML
8 INJECTION INTRAMUSCULAR; INTRAVENOUS AS NEEDED
Status: CANCELLED | OUTPATIENT
Start: 2021-03-23

## 2021-03-23 ENCOUNTER — HOSPITAL ENCOUNTER (OUTPATIENT)
Dept: INFUSION THERAPY | Age: 41
Discharge: HOME | End: 2021-03-23
Payer: COMMERCIAL

## 2021-03-23 VITALS
BODY MASS INDEX: 22.3 KG/M2 | WEIGHT: 130.6 LBS | DIASTOLIC BLOOD PRESSURE: 80 MMHG | HEART RATE: 57 BPM | RESPIRATION RATE: 16 BRPM | OXYGEN SATURATION: 99 % | SYSTOLIC BLOOD PRESSURE: 134 MMHG | HEIGHT: 64 IN | TEMPERATURE: 97.1 F

## 2021-03-23 DIAGNOSIS — C50.912 STAGE II BREAST CANCER, LEFT (HCC): Primary | ICD-10-CM

## 2021-03-23 LAB
ALBUMIN SERPL-MCNC: 3.7 G/DL (ref 3.5–5)
ALBUMIN/GLOB SERPL: 1.1 {RATIO} (ref 1.1–2.2)
ALP SERPL-CCNC: 78 U/L (ref 45–117)
ALT SERPL-CCNC: 24 U/L (ref 12–78)
ANION GAP SERPL CALC-SCNC: 3 MMOL/L (ref 5–15)
AST SERPL-CCNC: 34 U/L (ref 15–37)
BASO+EOS+MONOS # BLD AUTO: 0.3 K/UL (ref 0.2–1.2)
BASO+EOS+MONOS NFR BLD AUTO: 9 % (ref 3.2–16.9)
BILIRUB SERPL-MCNC: 0.4 MG/DL (ref 0.2–1)
BUN SERPL-MCNC: 7 MG/DL (ref 6–20)
BUN/CREAT SERPL: 11 (ref 12–20)
CALCIUM SERPL-MCNC: 8.8 MG/DL (ref 8.5–10.1)
CHLORIDE SERPL-SCNC: 104 MMOL/L (ref 97–108)
CO2 SERPL-SCNC: 30 MMOL/L (ref 21–32)
CREAT SERPL-MCNC: 0.66 MG/DL (ref 0.55–1.02)
DIFFERENTIAL METHOD BLD: ABNORMAL
ERYTHROCYTE [DISTWIDTH] IN BLOOD BY AUTOMATED COUNT: 14.8 % (ref 11.8–15.8)
GLOBULIN SER CALC-MCNC: 3.5 G/DL (ref 2–4)
GLUCOSE SERPL-MCNC: 90 MG/DL (ref 65–100)
HCT VFR BLD AUTO: 34.8 % (ref 35–47)
HGB BLD-MCNC: 12 G/DL (ref 11.5–16)
LYMPHOCYTES # BLD: 0.7 K/UL (ref 0.8–3.5)
LYMPHOCYTES NFR BLD: 19 % (ref 12–49)
MCH RBC QN AUTO: 30.1 PG (ref 26–34)
MCHC RBC AUTO-ENTMCNC: 34.5 G/DL (ref 30–36.5)
MCV RBC AUTO: 87.2 FL (ref 80–99)
NEUTS SEG # BLD: 2.6 K/UL (ref 1.8–8)
NEUTS SEG NFR BLD: 73 % (ref 32–75)
PLATELET # BLD AUTO: 140 K/UL (ref 150–400)
POTASSIUM SERPL-SCNC: 3.6 MMOL/L (ref 3.5–5.1)
PROT SERPL-MCNC: 7.2 G/DL (ref 6.4–8.2)
RBC # BLD AUTO: 3.99 M/UL (ref 3.8–5.2)
SODIUM SERPL-SCNC: 137 MMOL/L (ref 136–145)
WBC # BLD AUTO: 3.6 K/UL (ref 3.6–11)

## 2021-03-23 PROCEDURE — 96375 TX/PRO/DX INJ NEW DRUG ADDON: CPT

## 2021-03-23 PROCEDURE — 74011250636 HC RX REV CODE- 250/636: Performed by: INTERNAL MEDICINE

## 2021-03-23 PROCEDURE — 77030016057 HC NDL HUBR APOL -B

## 2021-03-23 PROCEDURE — 36415 COLL VENOUS BLD VENIPUNCTURE: CPT

## 2021-03-23 PROCEDURE — 96413 CHEMO IV INFUSION 1 HR: CPT

## 2021-03-23 PROCEDURE — 80053 COMPREHEN METABOLIC PANEL: CPT

## 2021-03-23 PROCEDURE — 85025 COMPLETE CBC W/AUTO DIFF WBC: CPT

## 2021-03-23 RX ORDER — ONDANSETRON 2 MG/ML
8 INJECTION INTRAMUSCULAR; INTRAVENOUS ONCE
Status: COMPLETED | OUTPATIENT
Start: 2021-03-23 | End: 2021-03-23

## 2021-03-23 RX ORDER — SODIUM CHLORIDE 0.9 % (FLUSH) 0.9 %
10 SYRINGE (ML) INJECTION AS NEEDED
Status: DISPENSED | OUTPATIENT
Start: 2021-03-23 | End: 2021-03-23

## 2021-03-23 RX ORDER — HEPARIN 100 UNIT/ML
300-500 SYRINGE INTRAVENOUS AS NEEDED
Status: ACTIVE | OUTPATIENT
Start: 2021-03-23 | End: 2021-03-23

## 2021-03-23 RX ORDER — SODIUM CHLORIDE 9 MG/ML
10 INJECTION INTRAMUSCULAR; INTRAVENOUS; SUBCUTANEOUS AS NEEDED
Status: ACTIVE | OUTPATIENT
Start: 2021-03-23 | End: 2021-03-23

## 2021-03-23 RX ORDER — SODIUM CHLORIDE 9 MG/ML
25 INJECTION, SOLUTION INTRAVENOUS CONTINUOUS
Status: DISPENSED | OUTPATIENT
Start: 2021-03-23 | End: 2021-03-23

## 2021-03-23 RX ADMIN — ONDANSETRON 8 MG: 2 INJECTION INTRAMUSCULAR; INTRAVENOUS at 11:55

## 2021-03-23 RX ADMIN — ADO-TRASTUZUMAB EMTANSINE 200 MG: 20 INJECTION, POWDER, LYOPHILIZED, FOR SOLUTION INTRAVENOUS at 12:44

## 2021-03-23 RX ADMIN — SODIUM CHLORIDE 25 ML/HR: 9 INJECTION, SOLUTION INTRAVENOUS at 11:55

## 2021-03-23 RX ADMIN — HEPARIN 500 UNITS: 100 SYRINGE at 13:16

## 2021-03-23 RX ADMIN — Medication 10 ML: at 13:16

## 2021-03-23 NOTE — PROGRESS NOTES
\[facility] [place] [facility]\"" Progress Note    Date: 2021    Name: Zeferino Buckner    MRN: [id number]         : 1980    1020:  Ms. Diogenes Finnegan Arrived ambulatory and in no distress for C6D1 of Kadcyla Regimen. Assessment was completed, no acute issues at this time, no new complaints voiced. Right chest wall port accessed without difficulty using 0.75 inch le needle, labs drawn & sent for processing. 1. Do you have any symptoms of COVID-19? SOB, coughing, fever, or generally not feeling well NO    2. Have you been exposed to COVID-19 recently? NO    3. Have you had any recent contact with family/friend that has a pending COVID test? NO    Chemotherapy Flowsheet 3/23/2021   Cycle C6D1   Date 3/23/2021   Drug / Regimen Kadcyla   Pre Meds given   Notes given         Ms. Mulligan's vitals were reviewed. Patient Vitals for the past 24 hrs:   Temp Pulse Resp BP SpO2   21 1313 97.1 °F (36.2 °C) (!) 57 16 134/80    21 1025 97.1 °F (36.2 °C) (!) 58 16 124/85 99 %       Lab results were obtained and reviewed. Recent Results (from the past 12 hour(s))   CBC WITH 3 PART DIFF    Collection Time: 21 10:31 AM   Result Value Ref Range    WBC 3.6 3.6 - 11.0 K/uL    RBC 3.99 3.80 - 5.20 M/uL    HGB 12.0 11.5 - 16.0 g/dL    HCT 34.8 (L) 35.0 - 47.0 %    MCV 87.2 80.0 - 99.0 FL    MCH 30.1 26.0 - 34.0 PG    MCHC 34.5 30.0 - 36.5 g/dL    RDW 14.8 11.8 - 15.8 %    PLATELET 931 (L) 007 - 400 K/uL    NEUTROPHILS 73 32 - 75 %    MIXED CELLS 9 3.2 - 16.9 %    LYMPHOCYTES 19 12 - 49 %    ABS. NEUTROPHILS 2.6 1.8 - 8.0 K/UL    ABS. MIXED CELLS 0.3 0.2 - 1.2 K/uL    ABS.  LYMPHOCYTES 0.7 (L) 0.8 - 3.5 K/UL    DF AUTOMATED     METABOLIC PANEL, COMPREHENSIVE    Collection Time: 21 10:31 AM   Result Value Ref Range    Sodium 137 136 - 145 mmol/L    Potassium 3.6 3.5 - 5.1 mmol/L    Chloride 104 97 - 108 mmol/L    CO2 30 21 - 32 mmol/L    Anion gap 3 (L) 5 - 15 mmol/L    Glucose 90 65 - 100 mg/dL    BUN 7 6 - 20 MG/DL Creatinine 0.66 0.55 - 1.02 MG/DL    BUN/Creatinine ratio 11 (L) 12 - 20      GFR est AA >60 >60 ml/min/1.73m2    GFR est non-AA >60 >60 ml/min/1.73m2    Calcium 8.8 8.5 - 10.1 MG/DL    Bilirubin, total 0.4 0.2 - 1.0 MG/DL    ALT (SGPT) 24 12 - 78 U/L    AST (SGOT) 34 15 - 37 U/L    Alk. phosphatase 78 45 - 117 U/L    Protein, total 7.2 6.4 - 8.2 g/dL    Albumin 3.7 3.5 - 5.0 g/dL    Globulin 3.5 2.0 - 4.0 g/dL    A-G Ratio 1.1 1.1 - 2.2         Medications:  Medications Administered     0.9% sodium chloride infusion     Admin Date  03/23/2021 Action  New Bag Dose  25 mL/hr Rate  25 mL/hr Route  IntraVENous Administered By  Clint Cook RN          [facility]) 200 mg in 0.9% sodium chloride 250 mL, overfill volume 25 mL Chemo infusion     Admin Date  03/23/2021 Action  New Bag Dose  200 mg Rate  570 mL/hr Route  IntraVENous Administered By  Clint Cook RN          heparin (porcine) pf 300-500 Units     Admin Date  03/23/2021 Action  Given Dose  500 Units Route  InterCATHeter Administered By  Clint Cook RN          ondansetron [facility]) injection 8 mg     Admin Date  03/23/2021 Action  Given Dose  8 mg Route  IntraVENous Administered By  Clint Cook RN          sodium chloride (NS) flush 10 mL     Admin Date  03/23/2021 Action  Given Dose  10 mL Route  IntraVENous Administered By  Clint Cook RN                Ms. Paty Gusman tolerated treatment well and was discharged from [place] [address] in stable condition at 1320. Port de-accessed, flushed & heparinized per protocol. She is to return on April 13 at 1000 for her next appointment.     Masood Lynne RN  March 23, 2021

## 2021-04-06 RX ORDER — ALBUTEROL SULFATE 0.83 MG/ML
2.5 SOLUTION RESPIRATORY (INHALATION) AS NEEDED
Status: CANCELLED
Start: 2021-04-13

## 2021-04-06 RX ORDER — DIPHENHYDRAMINE HYDROCHLORIDE 50 MG/ML
25 INJECTION, SOLUTION INTRAMUSCULAR; INTRAVENOUS AS NEEDED
Status: CANCELLED
Start: 2021-04-13

## 2021-04-06 RX ORDER — ONDANSETRON 2 MG/ML
8 INJECTION INTRAMUSCULAR; INTRAVENOUS AS NEEDED
Status: CANCELLED | OUTPATIENT
Start: 2021-04-13

## 2021-04-06 RX ORDER — HYDROCORTISONE SODIUM SUCCINATE 100 MG/2ML
100 INJECTION, POWDER, FOR SOLUTION INTRAMUSCULAR; INTRAVENOUS AS NEEDED
Status: CANCELLED | OUTPATIENT
Start: 2021-04-13

## 2021-04-06 RX ORDER — ACETAMINOPHEN 325 MG/1
650 TABLET ORAL AS NEEDED
Status: CANCELLED
Start: 2021-04-13

## 2021-04-06 RX ORDER — EPINEPHRINE 1 MG/ML
0.3 INJECTION, SOLUTION, CONCENTRATE INTRAVENOUS AS NEEDED
Status: CANCELLED | OUTPATIENT
Start: 2021-04-13

## 2021-04-06 RX ORDER — DIPHENHYDRAMINE HYDROCHLORIDE 50 MG/ML
50 INJECTION, SOLUTION INTRAMUSCULAR; INTRAVENOUS AS NEEDED
Status: CANCELLED
Start: 2021-04-13

## 2021-04-13 ENCOUNTER — HOSPITAL ENCOUNTER (OUTPATIENT)
Dept: INFUSION THERAPY | Age: 41
Discharge: HOME | End: 2021-04-13
Payer: COMMERCIAL

## 2021-04-13 ENCOUNTER — OFFICE VISIT (OUTPATIENT)
Dept: ONCOLOGY | Age: 41
End: 2021-04-13
Payer: COMMERCIAL

## 2021-04-13 VITALS
DIASTOLIC BLOOD PRESSURE: 82 MMHG | RESPIRATION RATE: 16 BRPM | OXYGEN SATURATION: 99 % | BODY MASS INDEX: 22.36 KG/M2 | HEART RATE: 63 BPM | SYSTOLIC BLOOD PRESSURE: 132 MMHG | HEIGHT: 64 IN | TEMPERATURE: 98.5 F | WEIGHT: 131 LBS

## 2021-04-13 VITALS
HEIGHT: 64 IN | OXYGEN SATURATION: 98 % | BODY MASS INDEX: 22.36 KG/M2 | RESPIRATION RATE: 16 BRPM | TEMPERATURE: 98 F | WEIGHT: 131 LBS | HEART RATE: 60 BPM | SYSTOLIC BLOOD PRESSURE: 133 MMHG | DIASTOLIC BLOOD PRESSURE: 83 MMHG

## 2021-04-13 DIAGNOSIS — Z17.0 MALIGNANT NEOPLASM OF CENTRAL PORTION OF LEFT BREAST IN FEMALE, ESTROGEN RECEPTOR POSITIVE (HCC): Primary | ICD-10-CM

## 2021-04-13 DIAGNOSIS — C50.912 STAGE II BREAST CANCER, LEFT (HCC): Primary | ICD-10-CM

## 2021-04-13 DIAGNOSIS — C50.112 MALIGNANT NEOPLASM OF CENTRAL PORTION OF LEFT BREAST IN FEMALE, ESTROGEN RECEPTOR POSITIVE (HCC): Primary | ICD-10-CM

## 2021-04-13 LAB
ALBUMIN SERPL-MCNC: 4.1 G/DL (ref 3.5–5)
ALBUMIN/GLOB SERPL: 1.2 {RATIO} (ref 1.1–2.2)
ALP SERPL-CCNC: 85 U/L (ref 45–117)
ALT SERPL-CCNC: 23 U/L (ref 12–78)
ANION GAP SERPL CALC-SCNC: 5 MMOL/L (ref 5–15)
AST SERPL-CCNC: 29 U/L (ref 15–37)
BASO+EOS+MONOS # BLD AUTO: 0.4 K/UL (ref 0.2–1.2)
BASO+EOS+MONOS NFR BLD AUTO: 11 % (ref 3.2–16.9)
BILIRUB SERPL-MCNC: 0.5 MG/DL (ref 0.2–1)
BUN SERPL-MCNC: 12 MG/DL (ref 6–20)
BUN/CREAT SERPL: 20 (ref 12–20)
CALCIUM SERPL-MCNC: 9.3 MG/DL (ref 8.5–10.1)
CHLORIDE SERPL-SCNC: 105 MMOL/L (ref 97–108)
CO2 SERPL-SCNC: 28 MMOL/L (ref 21–32)
CREAT SERPL-MCNC: 0.6 MG/DL (ref 0.55–1.02)
DIFFERENTIAL METHOD BLD: ABNORMAL
ERYTHROCYTE [DISTWIDTH] IN BLOOD BY AUTOMATED COUNT: 14.3 % (ref 11.8–15.8)
GLOBULIN SER CALC-MCNC: 3.5 G/DL (ref 2–4)
GLUCOSE SERPL-MCNC: 92 MG/DL (ref 65–100)
HCT VFR BLD AUTO: 37.7 % (ref 35–47)
HGB BLD-MCNC: 13.1 G/DL (ref 11.5–16)
LYMPHOCYTES # BLD: 0.7 K/UL (ref 0.8–3.5)
LYMPHOCYTES NFR BLD: 19 % (ref 12–49)
MCH RBC QN AUTO: 30.8 PG (ref 26–34)
MCHC RBC AUTO-ENTMCNC: 34.7 G/DL (ref 30–36.5)
MCV RBC AUTO: 88.7 FL (ref 80–99)
NEUTS SEG # BLD: 2.7 K/UL (ref 1.8–8)
NEUTS SEG NFR BLD: 70 % (ref 32–75)
PLATELET # BLD AUTO: 137 K/UL (ref 150–400)
POTASSIUM SERPL-SCNC: 3.8 MMOL/L (ref 3.5–5.1)
PROT SERPL-MCNC: 7.6 G/DL (ref 6.4–8.2)
RBC # BLD AUTO: 4.25 M/UL (ref 3.8–5.2)
SODIUM SERPL-SCNC: 138 MMOL/L (ref 136–145)
WBC # BLD AUTO: 3.8 K/UL (ref 3.6–11)

## 2021-04-13 PROCEDURE — 85025 COMPLETE CBC W/AUTO DIFF WBC: CPT

## 2021-04-13 PROCEDURE — 77030012965 HC NDL HUBR BBMI -A

## 2021-04-13 PROCEDURE — 96375 TX/PRO/DX INJ NEW DRUG ADDON: CPT

## 2021-04-13 PROCEDURE — 36415 COLL VENOUS BLD VENIPUNCTURE: CPT

## 2021-04-13 PROCEDURE — 96413 CHEMO IV INFUSION 1 HR: CPT

## 2021-04-13 PROCEDURE — 80053 COMPREHEN METABOLIC PANEL: CPT

## 2021-04-13 PROCEDURE — 99215 OFFICE O/P EST HI 40 MIN: CPT | Performed by: INTERNAL MEDICINE

## 2021-04-13 PROCEDURE — 74011250636 HC RX REV CODE- 250/636: Performed by: INTERNAL MEDICINE

## 2021-04-13 RX ORDER — SODIUM CHLORIDE 9 MG/ML
25 INJECTION, SOLUTION INTRAVENOUS CONTINUOUS
Status: DISPENSED | OUTPATIENT
Start: 2021-04-13 | End: 2021-04-13

## 2021-04-13 RX ORDER — SODIUM CHLORIDE 0.9 % (FLUSH) 0.9 %
10 SYRINGE (ML) INJECTION AS NEEDED
Status: DISPENSED | OUTPATIENT
Start: 2021-04-13 | End: 2021-04-13

## 2021-04-13 RX ORDER — LORATADINE 10 MG/1
10 TABLET ORAL
COMMUNITY

## 2021-04-13 RX ORDER — SODIUM CHLORIDE 9 MG/ML
10 INJECTION INTRAMUSCULAR; INTRAVENOUS; SUBCUTANEOUS AS NEEDED
Status: ACTIVE | OUTPATIENT
Start: 2021-04-13 | End: 2021-04-13

## 2021-04-13 RX ORDER — HEPARIN 100 UNIT/ML
300-500 SYRINGE INTRAVENOUS AS NEEDED
Status: ACTIVE | OUTPATIENT
Start: 2021-04-13 | End: 2021-04-13

## 2021-04-13 RX ORDER — ONDANSETRON 2 MG/ML
8 INJECTION INTRAMUSCULAR; INTRAVENOUS ONCE
Status: COMPLETED | OUTPATIENT
Start: 2021-04-13 | End: 2021-04-13

## 2021-04-13 RX ADMIN — ADO-TRASTUZUMAB EMTANSINE 200 MG: 20 INJECTION, POWDER, LYOPHILIZED, FOR SOLUTION INTRAVENOUS at 12:46

## 2021-04-13 RX ADMIN — ONDANSETRON 8 MG: 2 INJECTION INTRAMUSCULAR; INTRAVENOUS at 12:12

## 2021-04-13 RX ADMIN — SODIUM CHLORIDE 25 ML/HR: 9 INJECTION, SOLUTION INTRAVENOUS at 12:11

## 2021-04-13 NOTE — PROGRESS NOTES
[facility] Progress Note    Date: 2021    Name: Asad Mas    MRN: [id number]         : 1980    Ms. Rozanne Sicard Arrived ambulatory and in no distress for C7D1 of Kedcyla Regimen. Assessment was completed, no acute issues at this time, no new complaints voiced. Right chest wall port accessed without difficulty, labs drawn & sent for processing. Chemotherapy Flowsheet 2021   Cycle C7D1   Date 2021   Drug / Regimen Kadcyla   Pre Meds given   Notes given        Patient proceed to appointment with Dr. Shwetha Brown. Ms. Mulligan's vitals were reviewed. Visit Vitals  /82   Pulse 63   Temp 98.5 °F (36.9 °C)   Resp 16   Ht 5' 4\" (1.626 m)   Wt 59.4 kg (131 lb)   SpO2 99%   BMI 22.49 kg/m²       Lab results were obtained and reviewed. Recent Results (from the past 12 hour(s))   CBC WITH 3 PART DIFF    Collection Time: 21 10:46 AM   Result Value Ref Range    WBC 3.8 3.6 - 11.0 K/uL    RBC 4.25 3.80 - 5.20 M/uL    HGB 13.1 11.5 - 16.0 g/dL    HCT 37.7 35.0 - 47.0 %    MCV 88.7 80.0 - 99.0 FL    MCH 30.8 26.0 - 34.0 PG    MCHC 34.7 30.0 - 36.5 g/dL    RDW 14.3 11.8 - 15.8 %    PLATELET 577 (L) 586 - 400 K/uL    NEUTROPHILS 70 32 - 75 %    MIXED CELLS 11 3.2 - 16.9 %    LYMPHOCYTES 19 12 - 49 %    ABS. NEUTROPHILS 2.7 1.8 - 8.0 K/UL    ABS. MIXED CELLS 0.4 0.2 - 1.2 K/uL    ABS.  LYMPHOCYTES 0.7 (L) 0.8 - 3.5 K/UL    DF AUTOMATED     METABOLIC PANEL, COMPREHENSIVE    Collection Time: 21 10:46 AM   Result Value Ref Range    Sodium 138 136 - 145 mmol/L    Potassium 3.8 3.5 - 5.1 mmol/L    Chloride 105 97 - 108 mmol/L    CO2 28 21 - 32 mmol/L    Anion gap 5 5 - 15 mmol/L    Glucose 92 65 - 100 mg/dL    BUN 12 6 - 20 MG/DL    Creatinine 0.60 0.55 - 1.02 MG/DL    BUN/Creatinine ratio 20 12 - 20      GFR est AA >60 >60 ml/min/1.73m2    GFR est non-AA >60 >60 ml/min/1.73m2    Calcium 9.3 8.5 - 10.1 MG/DL    Bilirubin, total 0.5 0.2 - 1.0 MG/DL    ALT (SGPT) 23 12 - 78 U/L    AST (SGOT) 29 15 - 37 U/L    Alk. phosphatase 85 45 - 117 U/L    Protein, total 7.6 6.4 - 8.2 g/dL    Albumin 4.1 3.5 - 5.0 g/dL    Globulin 3.5 2.0 - 4.0 g/dL    A-G Ratio 1.2 1.1 - 2.2         Medications:  Medications Administered     0.9% sodium chloride infusion     Admin Date  04/13/2021 Action  New Bag Dose  25 mL/hr Rate  25 mL/hr Route  IntraVENous Administered By  Beverly Porter RN          [facility]) 200 mg in 0.9% sodium chloride 250 mL, overfill volume 25 mL Chemo infusion     Admin Date  04/13/2021 Action  New Bag Dose  200 mg Rate  570 mL/hr Route  IntraVENous Administered By  Beverly Porter RN          ondansetron [facility]) injection 8 mg     Admin Date  04/13/2021 Action  Given Dose  8 mg Route  IntraVENous Administered By  Beverly Porter RN                  Ms. Shirley Jean-Baptiste tolerated treatment well and was discharged from [place] [address] in stable condition at 1330. Port de-accessed, flushed & heparinized per protocol. She is to return on May 5  for her next appointment.     Sheridan Hair RN  April 13, 2021

## 2021-04-13 NOTE — PROGRESS NOTES
[facility] [place] [facility] [place] [address] [address] [facility], [address] [facility]  W: [phone]  F: [phone]      Reason for Visit:   Lei Callahan is a 42 y.o. female who is seen in consultation at the request of Dr. Tennille Estes for evaluation of therapy for breast cancer    Plastic surg:  Dr. Yan Dee onc:  Dr. Stanley Lion    Treatment History:   · 20 left breast ax core bx: Adenocarcinoma, ER + at 86% ; DC + at 69%; HER 2 POSITIVE (IHC 2+, FISH ratio 3.2; sig/cell 6.08), ki67 29%  · 20 L breast core bx: Atypical 1 mm focus, highly suspicious for [facility], lobular features, the tumor does not histologically match the patient's prior axillary cancer, but could represent a small sample of the same tumor  · 20 L breast excisional bx: Subareolar lumpectomy, IDC, 1.6 cm, invasive tumor present at anterior and lateral margins, gr 2, + LVI, 1/1 LN involved, 1.1 cm, ER + at 48%, DC + at 72%, HER 2 POSITIVE (IHC 2+, FISH ratio 2, sig/cell 4.14)  · 20 Annye Comp genetic testing:  negative  · TCHP 2020-2020  · 2020 Bilateral mastectomy: Right breast: benign. Left breast: Inflammation, no residual carcinoma, 2/6 LNs (micromets in both, 1.7 mm, and 1.1 mm). pT1c ypN1mi cM0  · T-DM1 20-  · XRT 20-21   · Anastrozole 2021-    History of Present Illness:   She noticed a L axilla mass in 2020, leading to the pathology above. Interval history:  No complaints today, hot flashes and joint pain have nearly resolved    S/p covid 19 vaccines    FH:   No breast, ovarian, prostate, or pancreas cancer    LMP 2018    Past Medical History:   Diagnosis Date    Anxiety     Cancer [facility])     Essential hypertension     HX OTHER MEDICAL ,         Infertility     IVF with first pregnancy    Infertility, female     Joint pain     Psychiatric problem       Past Surgical History:   Procedure Laterality Date    HX BILATERAL MASTECTOMY      HX OTHER SURGICAL      [place] Teeth Removal    HX OTHER SURGICAL      1/2017 Excision of mole on toe with skin graph      Social History     Tobacco Use    Smoking status: Never Smoker    Smokeless tobacco: Never Used   Substance Use Topics    Alcohol use: Yes     Frequency: 4 or more times a week      Family History   Problem Relation Age of Onset    Diabetes Mother     Heart Disease Mother     Hypertension Mother     Hypertension Father     Cancer Paternal Grandmother         Lung cancer    Stroke Paternal Grandmother     Cancer Paternal Grandfather         Melanoma     Current Outpatient Medications   Medication Sig    loratadine (Claritin) 10 mg tablet Take 10 mg by mouth.  fluticasone propionate (FLONASE ALLERGY RELIEF NA) by Nasal route.  carvediloL (COREG) 12.5 mg tablet TAKE 1 TABLET BY MOUTH TWICE A DAY WITH MEALS    anastrozole (ARIMIDEX) 1 mg tablet Take 1 mg by mouth daily.  acetaminophen (TYLENOL PO) Take  by mouth.  lidocaine-prilocaine (EMLA) topical cream Apply  to affected area as needed for Pain.  ondansetron (ZOFRAN ODT) 8 mg disintegrating tablet Take 1 Tab by mouth every eight (8) hours as needed for Nausea or Vomiting. For 2 days following chemo    ibuprofen (MOTRIN) 600 mg tablet Take 1 Tab by mouth every six (6) hours as needed for Pain.  sertraline (ZOLOFT) 25 mg tablet Take 25 mg by mouth daily.  PNV No.22-Iron Cbn&Gluc-FA-DOS 27-1-50 mg Tab Take  by mouth. Indications: PREGNANCY     No current facility-administered medications for this visit. No Known Allergies     Review of Systems: A complete review of systems was obtained, negative except as described above.     Physical Exam:     Visit Vitals  /83   Pulse 60   Temp 98 °F (36.7 °C) (Temporal)   Resp 16   Ht 5' 4\" (1.626 m)   Wt 131 lb (59.4 kg)   SpO2 98%   BMI 22.49 kg/m²     ECOG PS: 0    Constitutional: Appears well-developed and well-nourished in no apparent distress      Mental status: Alert and awake, Oriented to person/place/time, Able to follow commands    Eyes: EOM normal, Sclera normal, No visible ocular discharge    HENT: Normocephalic, atraumatic    Mouth/Throat: Moist mucous membranes    External Ears normal    Neck: No visualized mass    Pulmonary/Chest: Respiratory effort normal, No visualized signs of difficulty breathing or respiratory distress    Musculoskeletal: Normal gait with no signs of ataxia, Normal range of motion of neck    Neurological: No facial asymmetry (Cranial nerve 7 motor function), No gaze palsy    Skin: No significant exanthematous lesions or discoloration noted on facial skin    Psychiatric: Normal affect, No hallucinations         Results:     Lab Results   Component Value Date/Time    WBC 3.8 04/13/2021 10:46 AM    HGB 13.1 04/13/2021 10:46 AM    HCT 37.7 04/13/2021 10:46 AM    PLATELET 232 (L) 70/39/3501 10:46 AM    MCV 88.7 04/13/2021 10:46 AM    ABS. NEUTROPHILS 2.7 04/13/2021 10:46 AM     Lab Results   Component Value Date/Time    Sodium 137 03/23/2021 10:31 AM    Potassium 3.6 03/23/2021 10:31 AM    Chloride 104 03/23/2021 10:31 AM    CO2 30 03/23/2021 10:31 AM    Glucose 90 03/23/2021 10:31 AM    BUN 7 03/23/2021 10:31 AM    Creatinine 0.66 03/23/2021 10:31 AM    GFR est AA >60 03/23/2021 10:31 AM    GFR est non-AA >60 03/23/2021 10:31 AM    Calcium 8.8 03/23/2021 10:31 AM     Lab Results   Component Value Date/Time    Bilirubin, total 0.4 03/23/2021 10:31 AM    ALT (SGPT) 24 03/23/2021 10:31 AM    Alk. phosphatase 78 03/23/2021 10:31 AM    Protein, total 7.2 03/23/2021 10:31 AM    Albumin 3.7 03/23/2021 10:31 AM    Globulin 3.5 03/23/2021 10:31 AM     5/20/20 breast  MRI  Subareolar region of L breast 1.2 cm mass, at least 2 LN on left, 3 cm largest  Right breast negative    5/20/20 bone scan  Heterogeneous activity in the ribs of uncertain significance.   This would be an unusual presentation of bony metastatic disease    5/20/20 CT c/a/p  negative    12/26/20 estradiol 8.6  FSH 95  LH 38.5    Records reviewed and summarized above. Assessment/plan:   1.  L breast cancer, LN + by core, ER +, IN +, HER 2 POSITIVE, cT1c cN1a cM0:  Stage IIA, prognostic stage IB    We explained to the patient that the goal of systemic adjuvant therapy is to improve the chances for cure and decrease the risk of relapse. We explained why a patient can have microscopic cancer spread now even though physical examination, laboratory studies and imaging studies are negative for cancer. We explained that the same treatments used now as adjuvant or preventive treatments rarely if ever are curative in women who develop metastases. TTE on 5/28/2020 EF 64%, TTE on 8/10/2020, EF 60%, TTE on 9/11/2020, EF 61%, overall stable, TTE on 12/2/2020, EF 60%, GLS stable. 3/8/21 TTE EF 62%    Bilateral mastectomies on 11/12/2020, no residual carcinoma in breast, 2/6 LNs (micromets). Discussed that with residual disease, will change from trastuzumab and pertuzumab to T-DM1 3.6 mg/kg IV q 3 weeks x 14 doses.  Side effects of T-DM1 include hepatotoxicity, pulmonary tox, thrombocytopenia, infusion reaction (rare), cardiotoxicity.  She is agreeable and has signed informed consent. T-DM1 #7/14 today. Discussed neratinib 240 mg daily with food for one year after the completion of herceptin. Discussed that there was only a 2% DFS benefit and that benefit was largely driven in the ER + population. Discussed that there are no data in its use post-T-DM1. Will discuss again after T-DM1.     Discussed common side effects of neratinib including but not limited to diarrhea, nausea, abdominal pain, fatigue, vomiting, rash, swollen and sore mouth (stomatitis), decreased appetite, muscle spasms, indigestion (dyspepsia), liver damage (AST or ALT enzyme increase), nail disorder, dry skin, abdominal swelling (distention), weight loss and urinary tract infection.     FSH, LH, estradiol on 12/29/2020, appears she is postmenopausal.    Anastrozole 1 mg daily, continue    We will plan to see the patient in follow up at least once per cycle, or sooner if symptoms warrant. Labs with each cycle to monitor for toxicity    2. Emotional well being:  She has excellent support and is coping well with her disease; on zoloft 75 mg daily    3. Genetic testing:  discussed potential ramifications of a positive test including the potential need for bilateral mastectomies and bilateral oophorectomies and the risk then for her family members to also have a mutation. VUS also discussed. Tested in Dr. Abdirahman Fowler office on 5/21/20, negative    4. Loss of fertility:  Discussed potential loss of menses and fertility. She is not interested in having further children. Declines oncofertility consult    5. Neuropathy:  resolved    6. Decline in LV strain and HTN: 7.8% decline since initial TTE on 5/28/2020, started on Coreg 3.125 mg BID. Repeat TTE on 9/10/2020, EF 61%, stable, 12/2/2020, stable 60%, strain stable. Previously increased to coreg 12.5 mg bid. 7. Thrombocytopenia:  Due to chemo. Will monitor. T-DM1 may cause thrombocytopenia    8. Leukopenia:  resolved    9. Joint pain:  Due to AI; improved    I appreciate the opportunity to participate in Ms. Amber Kinney [facility]. Signed By: Alber Sexton MD      No orders of the defined types were placed in this encounter.

## 2021-04-13 NOTE — PROGRESS NOTES
Azeem Brody is a 42 y.o. female Follow up for the evaluation of breast cancer. 1. Have you been to the ER, urgent care clinic since your last visit? Hospitalized since your last visit? No    2. Have you seen or consulted any other health care providers outside of the [address] [place] [address] since your last visit? Include any pap smears or colon screening.  No

## 2021-04-28 RX ORDER — EPINEPHRINE 1 MG/ML
0.3 INJECTION, SOLUTION, CONCENTRATE INTRAVENOUS AS NEEDED
Status: CANCELLED | OUTPATIENT
Start: 2021-05-04

## 2021-04-28 RX ORDER — ACETAMINOPHEN 325 MG/1
650 TABLET ORAL AS NEEDED
Status: CANCELLED
Start: 2021-05-04

## 2021-04-28 RX ORDER — DIPHENHYDRAMINE HYDROCHLORIDE 50 MG/ML
50 INJECTION, SOLUTION INTRAMUSCULAR; INTRAVENOUS AS NEEDED
Status: CANCELLED
Start: 2021-05-04

## 2021-04-28 RX ORDER — ALBUTEROL SULFATE 0.83 MG/ML
2.5 SOLUTION RESPIRATORY (INHALATION) AS NEEDED
Status: CANCELLED
Start: 2021-05-04

## 2021-04-28 RX ORDER — ONDANSETRON 2 MG/ML
8 INJECTION INTRAMUSCULAR; INTRAVENOUS AS NEEDED
Status: CANCELLED | OUTPATIENT
Start: 2021-05-04

## 2021-04-28 RX ORDER — DIPHENHYDRAMINE HYDROCHLORIDE 50 MG/ML
25 INJECTION, SOLUTION INTRAMUSCULAR; INTRAVENOUS AS NEEDED
Status: CANCELLED
Start: 2021-05-04

## 2021-04-28 RX ORDER — HYDROCORTISONE SODIUM SUCCINATE 100 MG/2ML
100 INJECTION, POWDER, FOR SOLUTION INTRAMUSCULAR; INTRAVENOUS AS NEEDED
Status: CANCELLED | OUTPATIENT
Start: 2021-05-04

## 2021-05-04 ENCOUNTER — PATIENT MESSAGE (OUTPATIENT)
Dept: ONCOLOGY | Age: 41
End: 2021-05-04

## 2021-05-04 ENCOUNTER — HOSPITAL ENCOUNTER (OUTPATIENT)
Dept: INFUSION THERAPY | Age: 41
Discharge: HOME | End: 2021-05-04
Payer: COMMERCIAL

## 2021-05-04 VITALS
BODY MASS INDEX: 24.11 KG/M2 | WEIGHT: 131 LBS | HEART RATE: 54 BPM | SYSTOLIC BLOOD PRESSURE: 134 MMHG | RESPIRATION RATE: 16 BRPM | OXYGEN SATURATION: 95 % | HEIGHT: 62 IN | DIASTOLIC BLOOD PRESSURE: 84 MMHG

## 2021-05-04 DIAGNOSIS — C50.912 STAGE II BREAST CANCER, LEFT (HCC): Primary | ICD-10-CM

## 2021-05-04 LAB
ALBUMIN SERPL-MCNC: 3.8 G/DL (ref 3.5–5)
ALBUMIN/GLOB SERPL: 1.1 {RATIO} (ref 1.1–2.2)
ALP SERPL-CCNC: 78 U/L (ref 45–117)
ALT SERPL-CCNC: 20 U/L (ref 12–78)
ANION GAP SERPL CALC-SCNC: 3 MMOL/L (ref 5–15)
AST SERPL-CCNC: 30 U/L (ref 15–37)
BASOPHILS # BLD: 0 K/UL (ref 0–0.1)
BASOPHILS NFR BLD: 1 % (ref 0–1)
BILIRUB SERPL-MCNC: 0.4 MG/DL (ref 0.2–1)
BUN SERPL-MCNC: 8 MG/DL (ref 6–20)
BUN/CREAT SERPL: 12 (ref 12–20)
CALCIUM SERPL-MCNC: 8.7 MG/DL (ref 8.5–10.1)
CHLORIDE SERPL-SCNC: 106 MMOL/L (ref 97–108)
CO2 SERPL-SCNC: 29 MMOL/L (ref 21–32)
CREAT SERPL-MCNC: 0.69 MG/DL (ref 0.55–1.02)
DIFFERENTIAL METHOD BLD: ABNORMAL
EOSINOPHIL # BLD: 0.2 K/UL (ref 0–0.4)
EOSINOPHIL NFR BLD: 4 % (ref 0–7)
ERYTHROCYTE [DISTWIDTH] IN BLOOD BY AUTOMATED COUNT: 13 % (ref 11.5–14.5)
FSH SERPL-ACNC: 8 MIU/ML
GLOBULIN SER CALC-MCNC: 3.6 G/DL (ref 2–4)
GLUCOSE SERPL-MCNC: 110 MG/DL (ref 65–100)
HCT VFR BLD AUTO: 36.9 % (ref 35–47)
HGB BLD-MCNC: 12.5 G/DL (ref 11.5–16)
IMM GRANULOCYTES # BLD AUTO: 0 K/UL (ref 0–0.04)
IMM GRANULOCYTES NFR BLD AUTO: 0 % (ref 0–0.5)
LH SERPL-ACNC: 7.8 MIU/ML
LYMPHOCYTES # BLD: 0.8 K/UL (ref 0.8–3.5)
LYMPHOCYTES NFR BLD: 20 % (ref 12–49)
MCH RBC QN AUTO: 30.1 PG (ref 26–34)
MCHC RBC AUTO-ENTMCNC: 33.9 G/DL (ref 30–36.5)
MCV RBC AUTO: 88.9 FL (ref 80–99)
MONOCYTES # BLD: 0.3 K/UL (ref 0–1)
MONOCYTES NFR BLD: 9 % (ref 5–13)
NEUTS SEG # BLD: 2.5 K/UL (ref 1.8–8)
NEUTS SEG NFR BLD: 66 % (ref 32–75)
NRBC # BLD: 0 K/UL (ref 0–0.01)
NRBC BLD-RTO: 0 PER 100 WBC
PLATELET # BLD AUTO: 132 K/UL (ref 150–400)
PMV BLD AUTO: 10.2 FL (ref 8.9–12.9)
POTASSIUM SERPL-SCNC: 3.5 MMOL/L (ref 3.5–5.1)
PROT SERPL-MCNC: 7.4 G/DL (ref 6.4–8.2)
RBC # BLD AUTO: 4.15 M/UL (ref 3.8–5.2)
RBC MORPH BLD: ABNORMAL
SODIUM SERPL-SCNC: 138 MMOL/L (ref 136–145)
WBC # BLD AUTO: 3.8 K/UL (ref 3.6–11)

## 2021-05-04 PROCEDURE — 83001 ASSAY OF GONADOTROPIN (FSH): CPT

## 2021-05-04 PROCEDURE — 74011000250 HC RX REV CODE- 250: Performed by: INTERNAL MEDICINE

## 2021-05-04 PROCEDURE — 74011250636 HC RX REV CODE- 250/636: Performed by: INTERNAL MEDICINE

## 2021-05-04 PROCEDURE — 80053 COMPREHEN METABOLIC PANEL: CPT

## 2021-05-04 PROCEDURE — 82670 ASSAY OF TOTAL ESTRADIOL: CPT

## 2021-05-04 PROCEDURE — 85025 COMPLETE CBC W/AUTO DIFF WBC: CPT

## 2021-05-04 PROCEDURE — 36415 COLL VENOUS BLD VENIPUNCTURE: CPT

## 2021-05-04 PROCEDURE — 96413 CHEMO IV INFUSION 1 HR: CPT

## 2021-05-04 PROCEDURE — 77030016057 HC NDL HUBR APOL -B

## 2021-05-04 PROCEDURE — 96375 TX/PRO/DX INJ NEW DRUG ADDON: CPT

## 2021-05-04 RX ORDER — SODIUM CHLORIDE 9 MG/ML
25 INJECTION, SOLUTION INTRAVENOUS CONTINUOUS
Status: DISPENSED | OUTPATIENT
Start: 2021-05-04 | End: 2021-05-04

## 2021-05-04 RX ORDER — ONDANSETRON 2 MG/ML
8 INJECTION INTRAMUSCULAR; INTRAVENOUS ONCE
Status: COMPLETED | OUTPATIENT
Start: 2021-05-04 | End: 2021-05-04

## 2021-05-04 RX ORDER — SODIUM CHLORIDE 9 MG/ML
10 INJECTION INTRAMUSCULAR; INTRAVENOUS; SUBCUTANEOUS AS NEEDED
Status: ACTIVE | OUTPATIENT
Start: 2021-05-04 | End: 2021-05-04

## 2021-05-04 RX ORDER — HEPARIN 100 UNIT/ML
300-500 SYRINGE INTRAVENOUS AS NEEDED
Status: ACTIVE | OUTPATIENT
Start: 2021-05-04 | End: 2021-05-04

## 2021-05-04 RX ORDER — SODIUM CHLORIDE 0.9 % (FLUSH) 0.9 %
10 SYRINGE (ML) INJECTION AS NEEDED
Status: DISPENSED | OUTPATIENT
Start: 2021-05-04 | End: 2021-05-04

## 2021-05-04 RX ADMIN — HEPARIN 500 UNITS: 100 SYRINGE at 12:50

## 2021-05-04 RX ADMIN — Medication 10 ML: at 12:50

## 2021-05-04 RX ADMIN — ADO-TRASTUZUMAB EMTANSINE 200 MG: 20 INJECTION, POWDER, LYOPHILIZED, FOR SOLUTION INTRAVENOUS at 12:17

## 2021-05-04 RX ADMIN — Medication 10 ML: at 11:18

## 2021-05-04 RX ADMIN — SODIUM CHLORIDE 25 ML/HR: 9 INJECTION, SOLUTION INTRAVENOUS at 11:46

## 2021-05-04 RX ADMIN — ONDANSETRON 8 MG: 2 INJECTION INTRAMUSCULAR; INTRAVENOUS at 11:46

## 2021-05-04 RX ADMIN — SODIUM CHLORIDE 10 ML: 9 INJECTION, SOLUTION INTRAMUSCULAR; INTRAVENOUS; SUBCUTANEOUS at 11:46

## 2021-05-04 NOTE — PROGRESS NOTES
[facility] Progress Note    Date: May 4, 2021    Name: Chay Obrien    MRN: [id number]         : 1980    Ms. Zeina Carmichael Arrived ambulatory and in no distress for C8D1 of Kadcya Regimen. Assessment was completed, no acute issues at this time, no new complaints voiced. Right chest wall port accessed without difficulty, labs drawn & sent for processing. Chemotherapy Flowsheet 2021   Cycle C8D1   Date 2021   Drug / Regimen Kadcyla   Pre Meds -   Notes -       The following COVID-19 screening questions were asked to the patient:  1. Do you have any symptoms of COVID-19? SOB, coughing, fever, or generally not feeling well? No.  2. Have you been exposed to COVID-19 recently? No.   3. Have you had any recent contact with family/friend that has a pending COVID test?   No.     Ms. Mulligan's vitals were reviewed. Patient Vitals for the past 12 hrs:   Pulse Resp BP SpO2   21 1255 (!) 54 16 134/84    21 1030 (!) 58 16 137/85 95 %       Lab results were obtained and reviewed. Recent Results (from the past 12 hour(s))   METABOLIC PANEL, COMPREHENSIVE    Collection Time: 21 10:37 AM   Result Value Ref Range    Sodium 138 136 - 145 mmol/L    Potassium 3.5 3.5 - 5.1 mmol/L    Chloride 106 97 - 108 mmol/L    CO2 29 21 - 32 mmol/L    Anion gap 3 (L) 5 - 15 mmol/L    Glucose 110 (H) 65 - 100 mg/dL    BUN 8 6 - 20 MG/DL    Creatinine 0.69 0.55 - 1.02 MG/DL    BUN/Creatinine ratio 12 12 - 20      GFR est AA >60 >60 ml/min/1.73m2    GFR est non-AA >60 >60 ml/min/1.73m2    Calcium 8.7 8.5 - 10.1 MG/DL    Bilirubin, total 0.4 0.2 - 1.0 MG/DL    ALT (SGPT) 20 12 - 78 U/L    AST (SGOT) 30 15 - 37 U/L    Alk.  phosphatase 78 45 - 117 U/L    Protein, total 7.4 6.4 - 8.2 g/dL    Albumin 3.8 3.5 - 5.0 g/dL    Globulin 3.6 2.0 - 4.0 g/dL    A-G Ratio 1.1 1.1 - 2.2     CBC WITH AUTOMATED DIFF    Collection Time: 21 10:37 AM   Result Value Ref Range    WBC 3.8 3.6 - 11.0 K/uL    RBC 4.15 3.80 - 5.20 M/uL    HGB 12.5 11.5 - 16.0 g/dL    HCT 36.9 35.0 - 47.0 %    MCV 88.9 80.0 - 99.0 FL    MCH 30.1 26.0 - 34.0 PG    MCHC 33.9 30.0 - 36.5 g/dL    RDW 13.0 11.5 - 14.5 %    PLATELET 768 (L) 581 - 400 K/uL    MPV 10.2 8.9 - 12.9 FL    NRBC 0.0 0  WBC    ABSOLUTE NRBC 0.00 0.00 - 0.01 K/uL    NEUTROPHILS 66 32 - 75 %    LYMPHOCYTES 20 12 - 49 %    MONOCYTES 9 5 - 13 %    EOSINOPHILS 4 0 - 7 %    BASOPHILS 1 0 - 1 %    IMMATURE GRANULOCYTES 0 0.0 - 0.5 %    ABS. NEUTROPHILS 2.5 1.8 - 8.0 K/UL    ABS. LYMPHOCYTES 0.8 0.8 - 3.5 K/UL    ABS. MONOCYTES 0.3 0.0 - 1.0 K/UL    ABS. EOSINOPHILS 0.2 0.0 - 0.4 K/UL    ABS. BASOPHILS 0.0 0.0 - 0.1 K/UL    ABS. IMM.  GRANS. 0.0 0.00 - 0.04 K/UL    DF SMEAR SCANNED      RBC COMMENTS NORMOCYTIC, NORMOCHROMIC         Medications:  Medications Administered     0.9% sodium chloride infusion     Admin Date  05/04/2021 Action  New Bag Dose  25 mL/hr Rate  25 mL/hr Route  IntraVENous Administered By  Meme Carroll RN          0.9% sodium chloride injection 10 mL     Admin Date  05/04/2021 Action  Given Dose  10 mL Route  IntraVENous Administered By  Meme Carroll RN          ADO-trastuzumab emtansine (KADCYLA) 200 mg in 0.9% sodium chloride 250 mL, overfill volume 25 mL Chemo infusion     Admin Date  05/04/2021 Action  New Bag Dose  200 mg Rate  570 mL/hr Route  IntraVENous Administered By  Meme Carroll RN          heparin (porcine) pf 300-500 Units     Admin Date  05/04/2021 Action  Given Dose  500 Units Route  InterCATHeter Administered By  Meme Carroll RN          ondansetron [facility]) injection 8 mg     Admin Date  05/04/2021 Action  Given Dose  8 mg Route  IntraVENous Administered By  Meme Carroll RN          sodium chloride (NS) flush 10 mL     Admin Date  05/04/2021 Action  Given Dose  10 mL Route  IntraVENous Administered By  Meme Carroll RN           Admin Date  05/04/2021 Action  Given Dose  10 mL Route  IntraVENous Administered By  Zack Brantley RN                  Ms. Daryle Dauer tolerated treatment well and was discharged from [place] [id number] in stable condition at [phone]. Port de-accessed, flushed & heparinized per protocol. She is to return on May 25 at 1000 for her next appointment.     Antonio Santacruz RN  May 4, 2021

## 2021-05-05 LAB — ESTRADIOL SERPL-MCNC: 441 PG/ML

## 2021-05-10 ENCOUNTER — VIRTUAL VISIT (OUTPATIENT)
Dept: ONCOLOGY | Age: 41
End: 2021-05-10
Payer: COMMERCIAL

## 2021-05-10 DIAGNOSIS — Z17.0 MALIGNANT NEOPLASM OF CENTRAL PORTION OF LEFT BREAST IN FEMALE, ESTROGEN RECEPTOR POSITIVE (HCC): Primary | ICD-10-CM

## 2021-05-10 DIAGNOSIS — C50.112 MALIGNANT NEOPLASM OF CENTRAL PORTION OF LEFT BREAST IN FEMALE, ESTROGEN RECEPTOR POSITIVE (HCC): Primary | ICD-10-CM

## 2021-05-10 PROCEDURE — 99214 OFFICE O/P EST MOD 30 MIN: CPT | Performed by: INTERNAL MEDICINE

## 2021-05-10 NOTE — PROGRESS NOTES
[facility] at [address], [address] [facility]  Kirt Punch: [phone]  F: [phone]        Reason for Visit:   Asad Mas is a 42 y.o. female who is seen by synchronous (real-time) audio-video technology for follow up of breast cancer    Breast surgeon:  Dr. Elizabeth Purcell surg:  Dr. Suad Go onc:  Dr. Telma Dietz    Treatment History:   · 20 left breast ax core bx: Adenocarcinoma, ER + at 86% ; [place] + at 69%; HER 2 POSITIVE (IHC 2+, FISH ratio 3.2; sig/cell 6.08), ki67 29%  · 20 L breast core bx: Atypical 1 mm focus, highly suspicious for [facility], lobular features, the tumor does not histologically match the patient's prior axillary cancer, but could represent a small sample of the same tumor  · 20 L breast excisional bx: Subareolar lumpectomy, IDC, 1.6 cm, invasive tumor present at anterior and lateral margins, gr 2, + LVI, 1/1 LN involved, 1.1 cm, ER + at 48%, MN + at 72%, HER 2 POSITIVE (IHC 2+, FISH ratio 2, sig/cell 4.14)  · 20 [place] genetic testing:  negative  · [facility]P 2020-2020  · 2020 Bilateral mastectomy: Right breast: benign. Left breast: Inflammation, no residual carcinoma, 2/6 LNs (micromets in both, 1.7 mm, and 1.1 mm). pT1c ypN1mi cM0  · T-DM1 20-  · XRT 20-21   · Anastrozole 2021-21    History of Present Illness:   She noticed a L axilla mass in 2020, leading to the pathology above. Interval history:  Her menstrual cycle returned 21, stopped anastrozole    S/p covid 19 vaccines    FH:   No breast, ovarian, prostate, or pancreas cancer    LMP 21    Past Medical History:   Diagnosis Date    Anxiety     Cancer [facility])     Essential hypertension     HX OTHER MEDICAL ,         Infertility     IVF with first pregnancy    Infertility, female     Joint pain     Psychiatric problem       Past Surgical History:   Procedure Laterality Date    HX BILATERAL MASTECTOMY  2020    HX OTHER SURGICAL      [place] Teeth Removal    HX OTHER SURGICAL      1/2017 Excision of mole on toe with skin graph      Social History     Tobacco Use    Smoking status: Never Smoker    Smokeless tobacco: Never Used   Substance Use Topics    Alcohol use: Yes     Frequency: 4 or more times a week      Family History   Problem Relation Age of Onset    Diabetes Mother     Heart Disease Mother     Hypertension Mother     Hypertension Father     Cancer Paternal Grandmother         Lung cancer    Stroke Paternal Grandmother     Cancer Paternal Grandfather         Melanoma     Current Outpatient Medications   Medication Sig    loratadine (Claritin) 10 mg tablet Take 10 mg by mouth.  fluticasone propionate (FLONASE ALLERGY RELIEF NA) by Nasal route.  carvediloL (COREG) 12.5 mg tablet TAKE 1 TABLET BY MOUTH TWICE A DAY WITH MEALS    anastrozole (ARIMIDEX) 1 mg tablet Take 1 mg by mouth daily.  acetaminophen (TYLENOL PO) Take  by mouth.  lidocaine-prilocaine (EMLA) topical cream Apply  to affected area as needed for Pain.  ondansetron (ZOFRAN ODT) 8 mg disintegrating tablet Take 1 Tab by mouth every eight (8) hours as needed for Nausea or Vomiting. For 2 days following chemo    ibuprofen (MOTRIN) 600 mg tablet Take 1 Tab by mouth every six (6) hours as needed for Pain.  sertraline (ZOLOFT) 25 mg tablet Take 25 mg by mouth daily.  PNV No.22-Iron Cbn&Gluc-FA-DOS 27-1-50 mg Tab Take  by mouth. Indications: PREGNANCY     No current facility-administered medications for this visit. No Known Allergies     Review of Systems: A complete review of systems was obtained, negative except as described above. Physical Exam:     There were no vitals taken for this visit.   ECOG PS: 0    General: alert, cooperative, no distress   Mental  status: normal mood, behavior, speech, dress, motor activity, and thought processes, able to follow commands   HENT: NCAT   Neck: no visualized mass   Resp: no respiratory distress   Neuro: no gross deficits   Skin: no discoloration or lesions of concern on visible areas   Psychiatric: normal affect, consistent with stated mood, no evidence of hallucinations       Due to this being a TeleHealth evaluation (During [facility]-69 public health emergency), many elements of the physical examination are unable to be assessed. Evaluation of the following organ systems was limited: Vitals/Constitutional/EENT/Resp/CV/GI//MS/Neuro/Skin/Heme-Lymph-Imm. Results:     Lab Results   Component Value Date/Time    WBC 3.8 05/04/2021 10:37 AM    HGB 12.5 05/04/2021 10:37 AM    HCT 36.9 05/04/2021 10:37 AM    PLATELET 189 (L) 17/30/6035 10:37 AM    MCV 88.9 05/04/2021 10:37 AM    ABS. NEUTROPHILS 2.5 05/04/2021 10:37 AM     Lab Results   Component Value Date/Time    Sodium 138 05/04/2021 10:37 AM    Potassium 3.5 05/04/2021 10:37 AM    Chloride 106 05/04/2021 10:37 AM    CO2 29 05/04/2021 10:37 AM    Glucose 110 (H) 05/04/2021 10:37 AM    BUN 8 05/04/2021 10:37 AM    Creatinine 0.69 05/04/2021 10:37 AM    GFR est AA >60 05/04/2021 10:37 AM    GFR est non-AA >60 05/04/2021 10:37 AM    Calcium 8.7 05/04/2021 10:37 AM     Lab Results   Component Value Date/Time    Bilirubin, total 0.4 05/04/2021 10:37 AM    ALT (SGPT) 20 05/04/2021 10:37 AM    Alk. phosphatase 78 05/04/2021 10:37 AM    Protein, total 7.4 05/04/2021 10:37 AM    Albumin 3.8 05/04/2021 10:37 AM    Globulin 3.6 05/04/2021 10:37 AM     5/20/20 breast  MRI  Subareolar region of L breast 1.2 cm mass, at least 2 LN on left, 3 cm largest  Right breast negative    5/20/20 bone scan  Heterogeneous activity in the ribs of uncertain significance. This would be an unusual presentation of bony metastatic disease    5/20/20 CT c/a/p  negative    12/26/20 estradiol 8.6  FSH 95  LH 38.5    5/4/21 estradiol 441  FSH 8  LH 7.8    Records reviewed and summarized above.       Assessment/plan:   1.  L breast cancer, LN + by core, ER +, IA +, HER 2 POSITIVE, cT1c cN1a cM0:  Stage IIA, prognostic stage IB  premenopausal    We explained to the patient that the goal of systemic adjuvant therapy is to improve the chances for cure and decrease the risk of relapse. We explained why a patient can have microscopic cancer spread now even though physical examination, laboratory studies and imaging studies are negative for cancer. We explained that the same treatments used now as adjuvant or preventive treatments rarely if ever are curative in women who develop metastases. TTE on 5/28/2020 EF 64%, TTE on 8/10/2020, EF 60%, TTE on 9/11/2020, EF 61%, overall stable, TTE on 12/2/2020, EF 60%, GLS stable. 3/8/21 TTE EF 62%, GLS normal    Bilateral mastectomies on 11/12/2020, no residual carcinoma in breast, 2/6 LNs (micromets). Discussed that with residual disease, will change from trastuzumab and pertuzumab to T-DM1 3.6 mg/kg IV q 3 weeks x 14 doses.  Side effects of T-DM1 include hepatotoxicity, pulmonary tox, thrombocytopenia, infusion reaction (rare), cardiotoxicity.  She is agreeable and has signed informed consent. S/p T-DM1 #8/14     Discussed neratinib 240 mg daily with food for one year after the completion of herceptin. Discussed that there was only a 2% DFS benefit and that benefit was largely driven in the ER + population. Discussed that there are no data in its use post-T-DM1. Will discuss again after T-DM1.     Discussed common side effects of neratinib including but not limited to diarrhea, nausea, abdominal pain, fatigue, vomiting, rash, swollen and sore mouth (stomatitis), decreased appetite, muscle spasms, indigestion (dyspepsia), liver damage (AST or ALT enzyme increase), nail disorder, dry skin, abdominal swelling (distention), weight loss and urinary tract infection.     She is premenopausal, her cycles have returned    We discussed that for high risk women with breast cancer (having either received chemotherapy or < 28years old), ovarian suppression(such as monthly lupron 3.75 mg IM) + AI was superior in terms of overall survival than tamoxifen alone. The added risks of worse QOL due to depression and worse menopausal symptoms were discussed with the addition of ovarian suppression. The risks and benefits of tamoxifen were discussed in detail and the patient was informed of the following: Risks include a 1% risk of endometrial cancer for postmenopausal women treated for five years but no (or a minimally increased) risk in premenopausal women and that most women who develop tamoxifen-associated endometrial cancer can be cured. Any bleeding in a postmenopausal woman should be reported to a health care professional. There is also a 1% risk of blood clots (thromboembolism) that can be fatal. All patients irrespective of age who take tamoxifen and who have not had a hysterectomy should have a pelvic exam and Pap smear yearly. Tamoxifen increases the risk of cataract formation and on rare occasions has caused retinal damage: an eye exam is recommended yearly. Other risks include vaginal discharge or dryness, the development or worsening of hot flashes or vasomotor symptoms, and bone loss in premenopausal women. There is excellent evidence that tamoxifen does not increase risk of depression, cause weight gain or have a major effect on sexual function. Available data suggests little or no effect on cognitive function. Benefits include a lowering of cholesterol and a reduction in the rate of bone loss for postmenopausal woman. Any other symptoms should be reported. After this discussion, she is agreeable to lupron 3.75 mg q 4 weeks and re-starting anastrozole after her 2nd inj.  rx in to check pricing at [facility]    We will plan to see the patient in follow up at least once per cycle, or sooner if symptoms warrant.  Labs with each cycle to monitor for toxicity    She brought up [id number], Vaccine to prevent recurrence in patients with HER-2 positive breast cancer. She appears eligible. Closest site is [facility]. She will consider. I do not see a downside other than travel for this. 2. Emotional well being:  She has excellent support and is coping well with her disease; on zoloft 75 mg daily    3. Genetic testing:  discussed potential ramifications of a positive test including the potential need for bilateral mastectomies and bilateral oophorectomies and the risk then for her family members to also have a mutation. VUS also discussed. Tested in Dr. Christelle Eli office on 5/21/20, negative    4. Loss of fertility:  Discussed potential loss of menses and fertility. She is not interested in having further children. Declines oncofertility consult    5. Decline in LV strain and HTN: 7.8% decline since initial TTE on 5/28/2020, started on Coreg 3.125 mg BID. Repeat TTE on 9/10/2020, EF 61%, stable, 12/2/2020, stable 60%, strain stable. 3/8/21 TTE EF 62%, strain improved and normal.  Previously increased to coreg 12.5 mg bid. 6. Thrombocytopenia:  Due to chemo. Will monitor. T-DM1 may cause thrombocytopenia    7. Joint pain:  Due to AI; improved    The patient was evaluated through a synchronous (real-time) audio-video encounter. The patient (or guardian if applicable) is aware that this is a billable service. Verbal consent to proceed has been obtained within the past 12 months. The visit was conducted pursuant to the emergency declaration under the [facility], [address] [place] [address] authority and the [employer] and [employer] Act. Patient identification was verified, and a caregiver was present when appropriate. The patient was located in a state where the provider was credentialed to provide care. I appreciate the opportunity to participate in Ms. Lin Martinez [facility].     Signed By: Efren Phalen, MD      No orders of the defined types were placed in this encounter.

## 2021-05-11 DIAGNOSIS — Z17.0 MALIGNANT NEOPLASM OF CENTRAL PORTION OF LEFT BREAST IN FEMALE, ESTROGEN RECEPTOR POSITIVE (HCC): ICD-10-CM

## 2021-05-11 DIAGNOSIS — C50.112 MALIGNANT NEOPLASM OF CENTRAL PORTION OF LEFT BREAST IN FEMALE, ESTROGEN RECEPTOR POSITIVE (HCC): ICD-10-CM

## 2021-05-13 ENCOUNTER — TELEPHONE (OUTPATIENT)
Dept: ONCOLOGY | Age: 41
End: 2021-05-13

## 2021-05-13 NOTE — TELEPHONE ENCOUNTER
5/11/21- Provider escribed Lupron prescription to [facility]. 5/12/21- Per [employer] representative prescription received. Prior authorization required. Initiated prior authorization via [employer] portal.     5/13/21- Prior authorization approved and representative [employer] informed. Per representative cost of care of $0 copay. No further financial assistance needed at this time. 5/17/21- Per Melyssa De La Cruz, representative with [facility] the patient was called to set up delivery but a voicemail was left. 5/18/21- Called patient and left a voicemail requesting patient to call [facility] at [phone] to set up delivery of the Lupron.

## 2021-05-18 RX ORDER — ALBUTEROL SULFATE 0.83 MG/ML
2.5 SOLUTION RESPIRATORY (INHALATION) AS NEEDED
Status: CANCELLED
Start: 2021-05-25

## 2021-05-18 RX ORDER — ONDANSETRON 2 MG/ML
8 INJECTION INTRAMUSCULAR; INTRAVENOUS AS NEEDED
Status: CANCELLED | OUTPATIENT
Start: 2021-05-25

## 2021-05-18 RX ORDER — DIPHENHYDRAMINE HYDROCHLORIDE 50 MG/ML
25 INJECTION, SOLUTION INTRAMUSCULAR; INTRAVENOUS AS NEEDED
Status: CANCELLED
Start: 2021-05-25

## 2021-05-18 RX ORDER — EPINEPHRINE 1 MG/ML
0.3 INJECTION, SOLUTION, CONCENTRATE INTRAVENOUS AS NEEDED
Status: CANCELLED | OUTPATIENT
Start: 2021-05-25

## 2021-05-18 RX ORDER — HYDROCORTISONE SODIUM SUCCINATE 100 MG/2ML
100 INJECTION, POWDER, FOR SOLUTION INTRAMUSCULAR; INTRAVENOUS AS NEEDED
Status: CANCELLED | OUTPATIENT
Start: 2021-05-25

## 2021-05-18 RX ORDER — DIPHENHYDRAMINE HYDROCHLORIDE 50 MG/ML
50 INJECTION, SOLUTION INTRAMUSCULAR; INTRAVENOUS AS NEEDED
Status: CANCELLED
Start: 2021-05-25

## 2021-05-18 RX ORDER — ACETAMINOPHEN 325 MG/1
650 TABLET ORAL AS NEEDED
Status: CANCELLED
Start: 2021-05-25

## 2021-05-25 ENCOUNTER — OFFICE VISIT (OUTPATIENT)
Dept: ONCOLOGY | Age: 41
End: 2021-05-25
Payer: COMMERCIAL

## 2021-05-25 ENCOUNTER — HOSPITAL ENCOUNTER (OUTPATIENT)
Dept: INFUSION THERAPY | Age: 41
Discharge: HOME | End: 2021-05-25
Payer: COMMERCIAL

## 2021-05-25 ENCOUNTER — RESEARCH ENCOUNTER (OUTPATIENT)
Dept: ONCOLOGY | Age: 41
End: 2021-05-25

## 2021-05-25 VITALS
SYSTOLIC BLOOD PRESSURE: 119 MMHG | DIASTOLIC BLOOD PRESSURE: 89 MMHG | WEIGHT: 131 LBS | TEMPERATURE: 97.7 F | BODY MASS INDEX: 24.11 KG/M2 | RESPIRATION RATE: 16 BRPM | HEIGHT: 62 IN | HEART RATE: 69 BPM

## 2021-05-25 VITALS
SYSTOLIC BLOOD PRESSURE: 119 MMHG | HEIGHT: 62 IN | BODY MASS INDEX: 24.11 KG/M2 | HEART RATE: 64 BPM | WEIGHT: 131 LBS | RESPIRATION RATE: 16 BRPM | DIASTOLIC BLOOD PRESSURE: 74 MMHG | OXYGEN SATURATION: 98 % | TEMPERATURE: 98.1 F

## 2021-05-25 DIAGNOSIS — C50.112 MALIGNANT NEOPLASM OF CENTRAL PORTION OF LEFT BREAST IN FEMALE, ESTROGEN RECEPTOR POSITIVE (HCC): Primary | ICD-10-CM

## 2021-05-25 DIAGNOSIS — Z79.899 ENCOUNTER FOR MONITORING CARDIOTOXIC DRUG THERAPY: ICD-10-CM

## 2021-05-25 DIAGNOSIS — Z17.0 MALIGNANT NEOPLASM OF CENTRAL PORTION OF LEFT BREAST IN FEMALE, ESTROGEN RECEPTOR POSITIVE (HCC): Primary | ICD-10-CM

## 2021-05-25 DIAGNOSIS — C50.912 STAGE II BREAST CANCER, LEFT (HCC): Primary | ICD-10-CM

## 2021-05-25 DIAGNOSIS — Z51.81 ENCOUNTER FOR MONITORING CARDIOTOXIC DRUG THERAPY: ICD-10-CM

## 2021-05-25 LAB
ALBUMIN SERPL-MCNC: 3.8 G/DL (ref 3.5–5)
ALBUMIN/GLOB SERPL: 1.1 {RATIO} (ref 1.1–2.2)
ALP SERPL-CCNC: 67 U/L (ref 45–117)
ALT SERPL-CCNC: 19 U/L (ref 12–78)
ANION GAP SERPL CALC-SCNC: 3 MMOL/L (ref 5–15)
AST SERPL-CCNC: 24 U/L (ref 15–37)
BASOPHILS # BLD: 0 K/UL (ref 0–0.1)
BASOPHILS NFR BLD: 1 % (ref 0–1)
BILIRUB SERPL-MCNC: 0.5 MG/DL (ref 0.2–1)
BUN SERPL-MCNC: 10 MG/DL (ref 6–20)
BUN/CREAT SERPL: 15 (ref 12–20)
CALCIUM SERPL-MCNC: 8.5 MG/DL (ref 8.5–10.1)
CHLORIDE SERPL-SCNC: 104 MMOL/L (ref 97–108)
CO2 SERPL-SCNC: 30 MMOL/L (ref 21–32)
CREAT SERPL-MCNC: 0.67 MG/DL (ref 0.55–1.02)
DIFFERENTIAL METHOD BLD: ABNORMAL
EOSINOPHIL # BLD: 0.1 K/UL (ref 0–0.4)
EOSINOPHIL NFR BLD: 3 % (ref 0–7)
ERYTHROCYTE [DISTWIDTH] IN BLOOD BY AUTOMATED COUNT: 12.9 % (ref 11.5–14.5)
GLOBULIN SER CALC-MCNC: 3.5 G/DL (ref 2–4)
GLUCOSE SERPL-MCNC: 127 MG/DL (ref 65–100)
HCT VFR BLD AUTO: 37.6 % (ref 35–47)
HGB BLD-MCNC: 12.7 G/DL (ref 11.5–16)
IMM GRANULOCYTES # BLD AUTO: 0 K/UL (ref 0–0.04)
IMM GRANULOCYTES NFR BLD AUTO: 0 % (ref 0–0.5)
LYMPHOCYTES # BLD: 0.6 K/UL (ref 0.8–3.5)
LYMPHOCYTES NFR BLD: 17 % (ref 12–49)
MCH RBC QN AUTO: 30 PG (ref 26–34)
MCHC RBC AUTO-ENTMCNC: 33.8 G/DL (ref 30–36.5)
MCV RBC AUTO: 88.7 FL (ref 80–99)
MONOCYTES # BLD: 0.3 K/UL (ref 0–1)
MONOCYTES NFR BLD: 8 % (ref 5–13)
NEUTS SEG # BLD: 2.7 K/UL (ref 1.8–8)
NEUTS SEG NFR BLD: 71 % (ref 32–75)
NRBC # BLD: 0 K/UL (ref 0–0.01)
NRBC BLD-RTO: 0 PER 100 WBC
PLATELET # BLD AUTO: 137 K/UL (ref 150–400)
PMV BLD AUTO: 10.1 FL (ref 8.9–12.9)
POTASSIUM SERPL-SCNC: 3.6 MMOL/L (ref 3.5–5.1)
PROT SERPL-MCNC: 7.3 G/DL (ref 6.4–8.2)
RBC # BLD AUTO: 4.24 M/UL (ref 3.8–5.2)
RBC MORPH BLD: ABNORMAL
SODIUM SERPL-SCNC: 137 MMOL/L (ref 136–145)
WBC # BLD AUTO: 3.7 K/UL (ref 3.6–11)

## 2021-05-25 PROCEDURE — 80053 COMPREHEN METABOLIC PANEL: CPT

## 2021-05-25 PROCEDURE — 99215 OFFICE O/P EST HI 40 MIN: CPT | Performed by: INTERNAL MEDICINE

## 2021-05-25 PROCEDURE — 36415 COLL VENOUS BLD VENIPUNCTURE: CPT

## 2021-05-25 PROCEDURE — 96413 CHEMO IV INFUSION 1 HR: CPT

## 2021-05-25 PROCEDURE — 74011250636 HC RX REV CODE- 250/636: Performed by: INTERNAL MEDICINE

## 2021-05-25 PROCEDURE — 96374 THER/PROPH/DIAG INJ IV PUSH: CPT

## 2021-05-25 PROCEDURE — 85025 COMPLETE CBC W/AUTO DIFF WBC: CPT

## 2021-05-25 PROCEDURE — 77030012965 HC NDL HUBR BBMI -A

## 2021-05-25 RX ORDER — SODIUM CHLORIDE 9 MG/ML
25 INJECTION, SOLUTION INTRAVENOUS CONTINUOUS
Status: DISCONTINUED | OUTPATIENT
Start: 2021-05-25 | End: 2021-05-26 | Stop reason: HOSPADM

## 2021-05-25 RX ORDER — SODIUM CHLORIDE 9 MG/ML
10 INJECTION INTRAMUSCULAR; INTRAVENOUS; SUBCUTANEOUS AS NEEDED
Status: DISCONTINUED | OUTPATIENT
Start: 2021-05-25 | End: 2021-05-26 | Stop reason: HOSPADM

## 2021-05-25 RX ORDER — ONDANSETRON 2 MG/ML
8 INJECTION INTRAMUSCULAR; INTRAVENOUS ONCE
Status: COMPLETED | OUTPATIENT
Start: 2021-05-25 | End: 2021-05-25

## 2021-05-25 RX ORDER — SODIUM CHLORIDE 0.9 % (FLUSH) 0.9 %
10 SYRINGE (ML) INJECTION AS NEEDED
Status: DISCONTINUED | OUTPATIENT
Start: 2021-05-25 | End: 2021-05-26 | Stop reason: HOSPADM

## 2021-05-25 RX ORDER — HEPARIN 100 UNIT/ML
300-500 SYRINGE INTRAVENOUS AS NEEDED
Status: DISCONTINUED | OUTPATIENT
Start: 2021-05-25 | End: 2021-05-26 | Stop reason: HOSPADM

## 2021-05-25 RX ADMIN — ONDANSETRON 8 MG: 2 INJECTION INTRAMUSCULAR; INTRAVENOUS at 12:54

## 2021-05-25 RX ADMIN — ADO-TRASTUZUMAB EMTANSINE 200 MG: 20 INJECTION, POWDER, LYOPHILIZED, FOR SOLUTION INTRAVENOUS at 13:35

## 2021-05-25 RX ADMIN — SODIUM CHLORIDE 25 ML/HR: 9 INJECTION, SOLUTION INTRAVENOUS at 13:25

## 2021-05-25 RX ADMIN — SODIUM CHLORIDE 10 ML: 9 INJECTION INTRAMUSCULAR; INTRAVENOUS; SUBCUTANEOUS at 14:16

## 2021-05-25 NOTE — PROGRESS NOTES
[facility] Progress Note    Date: May 25, 2021    Name: Simin Moncada    MRN: [id number]         : 1980    Ms. Herve Reilly Arrived ambulatory and in no distress for C9D1 of Kadcyla Regimen. Assessment was completed, no acute issues at this time, no new complaints voiced. Right chest wall port accessed without difficulty, labs drawn & sent for processing. Chemotherapy Flowsheet 2021   Cycle C8D1   Date 2021   Drug / Regimen Kadcyla   Pre Meds -   Notes -        Patient proceed to appointment with Dr. Jane Bird. Ms. Mulligan's vitals were reviewed. Visit Vitals  /74   Pulse 64   Temp 98.1 °F (36.7 °C)   Resp 16   Ht 5' 2\" (1.575 m)   Wt 59.4 kg (131 lb)   BMI 23.96 kg/m²       Lab results were obtained and reviewed. Recent Results (from the past 12 hour(s))   CBC WITH AUTOMATED DIFF    Collection Time: 21 10:52 AM   Result Value Ref Range    WBC 3.7 3.6 - 11.0 K/uL    RBC 4.24 3.80 - 5.20 M/uL    HGB 12.7 11.5 - 16.0 g/dL    HCT 37.6 35.0 - 47.0 %    MCV 88.7 80.0 - 99.0 FL    MCH 30.0 26.0 - 34.0 PG    MCHC 33.8 30.0 - 36.5 g/dL    RDW 12.9 11.5 - 14.5 %    PLATELET 885 (L) 657 - 400 K/uL    MPV 10.1 8.9 - 12.9 FL    NRBC 0.0 0  WBC    ABSOLUTE NRBC 0.00 0.00 - 0.01 K/uL    NEUTROPHILS 71 32 - 75 %    LYMPHOCYTES 17 12 - 49 %    MONOCYTES 8 5 - 13 %    EOSINOPHILS 3 0 - 7 %    BASOPHILS 1 0 - 1 %    IMMATURE GRANULOCYTES 0 0.0 - 0.5 %    ABS. NEUTROPHILS 2.7 1.8 - 8.0 K/UL    ABS. LYMPHOCYTES 0.6 (L) 0.8 - 3.5 K/UL    ABS. MONOCYTES 0.3 0.0 - 1.0 K/UL    ABS. EOSINOPHILS 0.1 0.0 - 0.4 K/UL    ABS. BASOPHILS 0.0 0.0 - 0.1 K/UL    ABS. IMM.  GRANS. 0.0 0.00 - 0.04 K/UL    DF SMEAR SCANNED      RBC COMMENTS NORMOCYTIC, NORMOCHROMIC         Medications:  Medications Administered     0.9% sodium chloride infusion     Admin Date  2021 Action  New Bag Dose  25 mL/hr Rate  25 mL/hr Route  IntraVENous Administered By  Rell Amezcua, RN          0.9% sodium chloride injection 10 mL Admin Date  05/25/2021 Action  Given Dose  10 mL Route  IntraVENous Administered By  Esperanza Cornelius RN          [facility] 200 mg in 0.9% sodium chloride 250 mL, overfill volume 25 mL Chemo infusion     Admin Date  05/25/2021 Action  New Bag Dose  200 mg Rate  570 mL/hr Route  IntraVENous Administered By  Esperanza Cornelius RN          ondansetron [facility]) injection 8 mg     Admin Date  05/25/2021 Action  Given Dose  8 mg Route  IntraVENous Administered By  Esperanza Cornelius RN                  Ms. Elroy Gamez tolerated treatment well and was discharged from [place] [address] in stable condition. Port de-accessed, flushed & heparinized per protocol. She is to return on Yolie 15 at 1300 for her next appointment.     Shima Trevino RN  May 25, 2021

## 2021-05-25 NOTE — PROGRESS NOTES
[facility] [place] [facility] [place] [address]  [address] [facility], [address] [facility] [address] [facility]  Cornelia Bras: [phone]  F: [phone]        Reason for Visit:   Alan Pantoja is a 42 y.o. female who is seen for follow up of breast cancer    Breast surgeon:  Dr. Angel Hinds surg:  Dr. Kaur Hannon onc:  Dr. Betsy Eisenmenger    Treatment History:   · 20 left breast ax core bx: Adenocarcinoma, ER + at 86% ; CO + at 69%; HER 2 POSITIVE (IHC 2+, FISH ratio 3.2; sig/cell 6.08), ki67 29%  · 20 L breast core bx: Atypical 1 mm focus, highly suspicious for ACUITY [facility], lobular features, the tumor does not histologically match the patient's prior axillary cancer, but could represent a small sample of the same tumor  · 20 L breast excisional bx: Subareolar lumpectomy, IDC, 1.6 cm, invasive tumor present at anterior and lateral margins, gr 2, + LVI, 1/1 LN involved, 1.1 cm, ER + at 48%, CO + at 72%, HER 2 POSITIVE (IHC 2+, FISH ratio 2, sig/cell 4.14)  · 20 Herminio Alvarez genetic testing:  negative  · T[facility]P 2020-2020  · 2020 Bilateral mastectomy: Right breast: benign. Left breast: Inflammation, no residual carcinoma, 2/6 LNs (micromets in both, 1.7 mm, and 1.1 mm). pT1c ypN1mi cM0  · T-DM1 20-  · XRT 20-21   · Anastrozole 2021-21 (menses returned)  · lupron 21-    History of Present Illness:   She noticed a L axilla mass in 2020, leading to the pathology above. Interval history:  No complaints today    S/p covid 19 vaccines    FH:   No breast, ovarian, prostate, or pancreas cancer    LMP 21    Past Medical History:   Diagnosis Date    Anxiety     Cancer [facility])     Essential hypertension     HX OTHER MEDICAL ,         Infertility     IVF with first pregnancy    Infertility, female     Joint pain     Psychiatric problem       Past Surgical History:   Procedure Laterality Date    HX BILATERAL MASTECTOMY      HX OTHER SURGICAL      [place] Teeth Removal    HX OTHER SURGICAL      1/2017 Excision of mole on toe with skin graph      Social History     Tobacco Use    Smoking status: Never Smoker    Smokeless tobacco: Never Used   Substance Use Topics    Alcohol use: Yes      Family History   Problem Relation Age of Onset    Diabetes Mother     Heart Disease Mother     Hypertension Mother     Hypertension Father     Cancer Paternal Grandmother         Lung cancer    Stroke Paternal Grandmother     Cancer Paternal Grandfather         Melanoma     Current Outpatient Medications   Medication Sig    leuprolide depot (LUPRON) 3.75 mg injection 1 Each by IntraMUSCular route every four (4) weeks.  loratadine (Claritin) 10 mg tablet Take 10 mg by mouth.  fluticasone propionate (FLONASE ALLERGY RELIEF NA) by Nasal route.  carvediloL (COREG) 12.5 mg tablet TAKE 1 TABLET BY MOUTH TWICE A DAY WITH MEALS    anastrozole (ARIMIDEX) 1 mg tablet Take 1 mg by mouth daily.  acetaminophen (TYLENOL PO) Take  by mouth.  lidocaine-prilocaine (EMLA) topical cream Apply  to affected area as needed for Pain.  ondansetron (ZOFRAN ODT) 8 mg disintegrating tablet Take 1 Tab by mouth every eight (8) hours as needed for Nausea or Vomiting. For 2 days following chemo    ibuprofen (MOTRIN) 600 mg tablet Take 1 Tab by mouth every six (6) hours as needed for Pain.  sertraline (ZOLOFT) 25 mg tablet Take 25 mg by mouth daily.  PNV No.22-Iron Cbn&Gluc-FA-DOS 27-1-50 mg Tab Take  by mouth. Indications: PREGNANCY     No current facility-administered medications for this visit. No Known Allergies     Review of Systems: A complete review of systems was obtained, negative except as described above.     Physical Exam:     Visit Vitals  /74   Pulse 64   Temp 98.1 °F (36.7 °C) (Temporal)   Resp 16   Ht 5' 2\" (1.575 m)   Wt 131 lb (59.4 kg)   SpO2 98%   BMI 23.96 kg/m²     ECOG PS: 0    General: alert, cooperative, no distress   Mental  status: normal mood, behavior, speech, dress, motor activity, and thought processes, able to follow commands   HENT: NCAT   Neck: no visualized mass   Resp: no respiratory distress   Neuro: no gross deficits   Skin: no discoloration or lesions of concern on visible areas   Psychiatric: normal affect, consistent with stated mood, no evidence of hallucinations               Results:     Lab Results   Component Value Date/Time    WBC 3.8 05/04/2021 10:37 AM    HGB 12.5 05/04/2021 10:37 AM    HCT 36.9 05/04/2021 10:37 AM    PLATELET 017 (L) 45/24/1532 10:37 AM    MCV 88.9 05/04/2021 10:37 AM    ABS. NEUTROPHILS 2.5 05/04/2021 10:37 AM     Lab Results   Component Value Date/Time    Sodium 138 05/04/2021 10:37 AM    Potassium 3.5 05/04/2021 10:37 AM    Chloride 106 05/04/2021 10:37 AM    CO2 29 05/04/2021 10:37 AM    Glucose 110 (H) 05/04/2021 10:37 AM    BUN 8 05/04/2021 10:37 AM    Creatinine 0.69 05/04/2021 10:37 AM    GFR est AA >60 05/04/2021 10:37 AM    GFR est non-AA >60 05/04/2021 10:37 AM    Calcium 8.7 05/04/2021 10:37 AM     Lab Results   Component Value Date/Time    Bilirubin, total 0.4 05/04/2021 10:37 AM    ALT (SGPT) 20 05/04/2021 10:37 AM    Alk. phosphatase 78 05/04/2021 10:37 AM    Protein, total 7.4 05/04/2021 10:37 AM    Albumin 3.8 05/04/2021 10:37 AM    Globulin 3.6 05/04/2021 10:37 AM     5/20/20 breast  MRI  Subareolar region of L breast 1.2 cm mass, at least 2 LN on left, 3 cm largest  Right breast negative    5/20/20 bone scan  Heterogeneous activity in the ribs of uncertain significance. This would be an unusual presentation of bony metastatic disease    5/20/20 CT c/a/p  negative    12/26/20 estradiol 8.6  FSH 95  LH 38.5    5/4/21 estradiol 441  FSH 8  LH 7.8    Records reviewed and summarized above.       Assessment/plan:   1.  L breast cancer, LN + by core, ER +, MD +, HER 2 POSITIVE, cT1c cN1a cM0:  Stage IIA, prognostic stage IB  premenopausal    We explained to the patient that the goal of systemic adjuvant therapy is to improve the chances for cure and decrease the risk of relapse. We explained why a patient can have microscopic cancer spread now even though physical examination, laboratory studies and imaging studies are negative for cancer. We explained that the same treatments used now as adjuvant or preventive treatments rarely if ever are curative in women who develop metastases. TTE on 5/28/2020 EF 64%, TTE on 8/10/2020, EF 60%, TTE on 9/11/2020, EF 61%, overall stable, TTE on 12/2/2020, EF 60%, GLS stable. 3/8/21 TTE EF 62%, GLS normal; next ordered for June    Bilateral mastectomies on 11/12/2020, no residual carcinoma in breast, 2/6 LNs (micromets). Discussed that with residual disease, will change from trastuzumab and pertuzumab to T-DM1 3.6 mg/kg IV q 3 weeks x 14 doses.  Side effects of T-DM1 include hepatotoxicity, pulmonary tox, thrombocytopenia, infusion reaction (rare), cardiotoxicity.  She is agreeable and has signed informed consent. T-DM1 #9/14 today    Discussed neratinib 240 mg daily with food for one year after the completion of herceptin. Discussed that there was only a 2% DFS benefit and that benefit was largely driven in the ER + population. Discussed that there are no data in its use post-T-DM1. Will discuss again after T-DM1.     Discussed common side effects of neratinib including but not limited to diarrhea, nausea, abdominal pain, fatigue, vomiting, rash, swollen and sore mouth (stomatitis), decreased appetite, muscle spasms, indigestion (dyspepsia), liver damage (AST or ALT enzyme increase), nail disorder, dry skin, abdominal swelling (distention), weight loss and urinary tract infection.     She is premenopausal, her cycles have returned    We discussed that for high risk women with breast cancer (having either received chemotherapy or < 28years old), ovarian suppression(such as monthly lupron 3.75 mg IM) + AI was superior in terms of overall survival than tamoxifen alone.  The added risks of worse QOL due to depression and worse menopausal symptoms were discussed with the addition of ovarian suppression. The risks and benefits of tamoxifen were discussed in detail and the patient was informed of the following: Risks include a 1% risk of endometrial cancer for postmenopausal women treated for five years but no (or a minimally increased) risk in premenopausal women and that most women who develop tamoxifen-associated endometrial cancer can be cured. Any bleeding in a postmenopausal woman should be reported to a health care professional. There is also a 1% risk of blood clots (thromboembolism) that can be fatal. All patients irrespective of age who take tamoxifen and who have not had a hysterectomy should have a pelvic exam and Pap smear yearly. Tamoxifen increases the risk of cataract formation and on rare occasions has caused retinal damage: an eye exam is recommended yearly. Other risks include vaginal discharge or dryness, the development or worsening of hot flashes or vasomotor symptoms, and bone loss in premenopausal women. There is excellent evidence that tamoxifen does not increase risk of depression, cause weight gain or have a major effect on sexual function. Available data suggests little or no effect on cognitive function. Benefits include a lowering of cholesterol and a reduction in the rate of bone loss for postmenopausal woman. Any other symptoms should be reported. After this discussion, she is agreeable to lupron 3.75 mg q 4 weeks and re-starting anastrozole after her 2nd inj. She will get that today    We will plan to see the patient in follow up at least once per cycle, or sooner if symptoms warrant. Labs with each cycle to monitor for toxicity    She brought up [id number], Vaccine to prevent recurrence in patients with HER-2 positive breast cancer. She appears eligible. Closest site is [facility] She will consider.   I do not see a downside other than travel for this. 2. Emotional well being:  She has excellent support and is coping well with her disease; on zoloft 75 mg daily    3. Genetic testing:  discussed potential ramifications of a positive test including the potential need for bilateral mastectomies and bilateral oophorectomies and the risk then for her family members to also have a mutation. VUS also discussed. Tested in Dr. Hunter Swenson office on 5/21/20, negative    4. Loss of fertility:  Discussed potential loss of menses and fertility. She is not interested in having further children. Declines oncofertility consult    5. Decline in LV strain and HTN: 7.8% decline since initial TTE on 5/28/2020, started on Coreg 3.125 mg BID. Repeat TTE on 9/10/2020, EF 61%, stable, 12/2/2020, stable 60%, strain stable. 3/8/21 TTE EF 62%, strain improved and normal.  Previously increased to coreg 12.5 mg bid. TTE ordered for first week of June    6. Thrombocytopenia:  Due to chemo. Will monitor. T-DM1 may cause thrombocytopenia    7. Joint pain:  Due to AI; improved    8. L side ROM issues: Will refer to velvet vasquez      I appreciate the opportunity to participate in Ms. Raegan Pedraza [facility]. Signed By: Cathy [place] MD      No orders of the defined types were placed in this encounter.

## 2021-05-25 NOTE — PROGRESS NOTES
Mic Cohn is a 42 y.o. female here for office visit and Lupron injection. Patient brought Lupron injection in a sealed container from her local pharmacy. Lupron 3.75 mg given IM in right buttock without difficulty. Patient tolerated well. Patient to schedule next injection for 28 days.

## 2021-05-25 NOTE — PROGRESS NOTES
Blake Apple is a 42 y.o. female Follow up for the evaluation of breast cancer. 1. Have you been to the ER, urgent care clinic since your last visit? Hospitalized since your last visit? No    2. Have you seen or consulted any other health care providers outside of the [address] [place] [address] since your last visit? Include any pap smears or colon screening.  No

## 2021-05-25 NOTE — PROGRESS NOTES
Pt in for labs and follow up visit today per protocol with Dr. Sonali Leiva and RN. This is week 52 of treatment. Assessments and QOLs completed per protocol. Patient to go to infusion center after appointment to receive Kadcyla. Next appt is scheduled for week 104.

## 2021-05-28 ENCOUNTER — TELEPHONE (OUTPATIENT)
Dept: ONCOLOGY | Age: 41
End: 2021-05-28

## 2021-05-28 NOTE — TELEPHONE ENCOUNTER
5/28/21 10:54 AM: Cedric Robledo [facility] physical therapy and spoke to Leander Briones, who stated they will call the patient and get an appointment set up.

## 2021-06-02 ENCOUNTER — ANCILLARY PROCEDURE (OUTPATIENT)
Dept: CARDIOLOGY CLINIC | Age: 41
End: 2021-06-02
Payer: COMMERCIAL

## 2021-06-02 VITALS
BODY MASS INDEX: 24.11 KG/M2 | DIASTOLIC BLOOD PRESSURE: 70 MMHG | HEIGHT: 62 IN | WEIGHT: 131 LBS | SYSTOLIC BLOOD PRESSURE: 120 MMHG

## 2021-06-02 DIAGNOSIS — Z79.899 ENCOUNTER FOR MONITORING CARDIOTOXIC DRUG THERAPY: ICD-10-CM

## 2021-06-02 DIAGNOSIS — C50.112 MALIGNANT NEOPLASM OF CENTRAL PORTION OF LEFT BREAST IN FEMALE, ESTROGEN RECEPTOR POSITIVE (HCC): ICD-10-CM

## 2021-06-02 DIAGNOSIS — Z51.81 ENCOUNTER FOR MONITORING CARDIOTOXIC DRUG THERAPY: ICD-10-CM

## 2021-06-02 DIAGNOSIS — Z17.0 MALIGNANT NEOPLASM OF CENTRAL PORTION OF LEFT BREAST IN FEMALE, ESTROGEN RECEPTOR POSITIVE (HCC): ICD-10-CM

## 2021-06-02 PROCEDURE — 93306 TTE W/DOPPLER COMPLETE: CPT | Performed by: INTERNAL MEDICINE

## 2021-06-03 LAB
ECHO AO ROOT DIAM: 2.86 CM
ECHO AV MEAN GRADIENT: 3.51 MMHG
ECHO AV PEAK GRADIENT: 6.24 MMHG
ECHO AV PEAK VELOCITY: 124.94 CM/S
ECHO AV VTI: 28.3 CM
ECHO LA AREA 4C: 16.25 CM2
ECHO LA MAJOR AXIS: 2.81 CM
ECHO LA MINOR AXIS: 1.76 CM
ECHO LA VOL 2C: 53.36 ML (ref 22–52)
ECHO LA VOL 4C: 40.69 ML (ref 22–52)
ECHO LA VOL BP: 51.15 ML (ref 22–52)
ECHO LA VOL/BSA BIPLANE: 31.97 ML/M2 (ref 16–28)
ECHO LA VOLUME INDEX A2C: 33.35 ML/M2 (ref 16–28)
ECHO LA VOLUME INDEX A4C: 25.43 ML/M2 (ref 16–28)
ECHO LV E' LATERAL VELOCITY: 13.12 CM/S
ECHO LV E' SEPTAL VELOCITY: 11.69 CM/S
ECHO LV EDV A2C: 94.53 ML
ECHO LV EDV A4C: 118.29 ML
ECHO LV EDV BP: 106.09 ML (ref 56–104)
ECHO LV EDV INDEX A4C: 73.9 ML/M2
ECHO LV EDV INDEX BP: 66.3 ML/M2
ECHO LV EDV NDEX A2C: 59.1 ML/M2
ECHO LV EJECTION FRACTION A2C: 61 PERCENT
ECHO LV EJECTION FRACTION A4C: 64 PERCENT
ECHO LV EJECTION FRACTION BIPLANE: 61.8 PERCENT (ref 55–100)
ECHO LV ESV A2C: 37.05 ML
ECHO LV ESV A4C: 42.49 ML
ECHO LV ESV BP: 40.48 ML (ref 19–49)
ECHO LV ESV INDEX A2C: 23.2 ML/M2
ECHO LV ESV INDEX A4C: 26.6 ML/M2
ECHO LV ESV INDEX BP: 25.3 ML/M2
ECHO LV GLOBAL LONGITUDINAL STRAIN (GLS): -20.5 PERCENT
ECHO LV INTERNAL DIMENSION DIASTOLIC: 4.21 CM (ref 3.9–5.3)
ECHO LV INTERNAL DIMENSION SYSTOLIC: 2.86 CM
ECHO LV IVSD: 0.8 CM (ref 0.6–0.9)
ECHO LV MASS 2D: 114.7 G (ref 67–162)
ECHO LV MASS INDEX 2D: 71.7 G/M2 (ref 43–95)
ECHO LV POSTERIOR WALL DIASTOLIC: 0.95 CM (ref 0.6–0.9)
ECHO LVOT PEAK GRADIENT: 3.52 MMHG
ECHO LVOT PEAK VELOCITY: 93.79 CM/S
ECHO LVOT VTI: 19.15 CM
ECHO MV A VELOCITY: 71.02 CM/S
ECHO MV AREA PHT: 4.78 CM2
ECHO MV E DECELERATION TIME (DT): 158.64 MS
ECHO MV E VELOCITY: 120.27 CM/S
ECHO MV E/A RATIO: 1.69
ECHO MV E/E' LATERAL: 9.17
ECHO MV E/E' RATIO (AVERAGED): 9.73
ECHO MV E/E' SEPTAL: 10.29
ECHO MV PRESSURE HALF TIME (PHT): 46.01 MS
ECHO RA AREA 4C: 12.29 CM2
ECHO RV INTERNAL DIMENSION: 3.38 CM
ECHO RV TAPSE: 2.35 CM (ref 1.5–2)
GLOBAL LONGITUDINAL STRAIN 2 CHAMBER: -19.3 PERCENT
GLOBAL LONGITUDINAL STRAIN 4 CHAMBER: -22.1 PERCENT
GLOBAL LONGITUDINAL STRAIN LONG AXIS: -20.2 PERCENT
LA VOL DISK BP: 46.68 ML (ref 22–52)

## 2021-06-09 RX ORDER — DIPHENHYDRAMINE HYDROCHLORIDE 50 MG/ML
25 INJECTION, SOLUTION INTRAMUSCULAR; INTRAVENOUS AS NEEDED
Status: CANCELLED
Start: 2021-06-15

## 2021-06-09 RX ORDER — HYDROCORTISONE SODIUM SUCCINATE 100 MG/2ML
100 INJECTION, POWDER, FOR SOLUTION INTRAMUSCULAR; INTRAVENOUS AS NEEDED
Status: CANCELLED | OUTPATIENT
Start: 2021-06-15

## 2021-06-09 RX ORDER — DIPHENHYDRAMINE HYDROCHLORIDE 50 MG/ML
50 INJECTION, SOLUTION INTRAMUSCULAR; INTRAVENOUS AS NEEDED
Status: CANCELLED
Start: 2021-06-15

## 2021-06-09 RX ORDER — EPINEPHRINE 1 MG/ML
0.3 INJECTION, SOLUTION, CONCENTRATE INTRAVENOUS AS NEEDED
Status: CANCELLED | OUTPATIENT
Start: 2021-06-15

## 2021-06-09 RX ORDER — ACETAMINOPHEN 325 MG/1
650 TABLET ORAL AS NEEDED
Status: CANCELLED
Start: 2021-06-15

## 2021-06-09 RX ORDER — ONDANSETRON 2 MG/ML
8 INJECTION INTRAMUSCULAR; INTRAVENOUS AS NEEDED
Status: CANCELLED | OUTPATIENT
Start: 2021-06-15

## 2021-06-09 RX ORDER — ALBUTEROL SULFATE 0.83 MG/ML
2.5 SOLUTION RESPIRATORY (INHALATION) AS NEEDED
Status: CANCELLED
Start: 2021-06-15

## 2021-06-15 ENCOUNTER — HOSPITAL ENCOUNTER (OUTPATIENT)
Dept: INFUSION THERAPY | Age: 41
Discharge: HOME | End: 2021-06-15
Payer: COMMERCIAL

## 2021-06-15 VITALS
WEIGHT: 130.3 LBS | SYSTOLIC BLOOD PRESSURE: 153 MMHG | BODY MASS INDEX: 23.98 KG/M2 | HEIGHT: 62 IN | TEMPERATURE: 97.6 F | DIASTOLIC BLOOD PRESSURE: 88 MMHG | OXYGEN SATURATION: 96 % | HEART RATE: 51 BPM | RESPIRATION RATE: 18 BRPM

## 2021-06-15 DIAGNOSIS — C50.912 STAGE II BREAST CANCER, LEFT (HCC): Primary | ICD-10-CM

## 2021-06-15 LAB
ALBUMIN SERPL-MCNC: 4 G/DL (ref 3.5–5)
ALBUMIN/GLOB SERPL: 1.1 {RATIO} (ref 1.1–2.2)
ALP SERPL-CCNC: 72 U/L (ref 45–117)
ALT SERPL-CCNC: 25 U/L (ref 12–78)
ANION GAP SERPL CALC-SCNC: 5 MMOL/L (ref 5–15)
AST SERPL-CCNC: 34 U/L (ref 15–37)
BASOPHILS # BLD: 0 K/UL (ref 0–0.1)
BASOPHILS NFR BLD: 1 % (ref 0–1)
BILIRUB SERPL-MCNC: 0.5 MG/DL (ref 0.2–1)
BUN SERPL-MCNC: 13 MG/DL (ref 6–20)
BUN/CREAT SERPL: 16 (ref 12–20)
CALCIUM SERPL-MCNC: 9 MG/DL (ref 8.5–10.1)
CHLORIDE SERPL-SCNC: 105 MMOL/L (ref 97–108)
CO2 SERPL-SCNC: 30 MMOL/L (ref 21–32)
CREAT SERPL-MCNC: 0.79 MG/DL (ref 0.55–1.02)
DIFFERENTIAL METHOD BLD: ABNORMAL
EOSINOPHIL # BLD: 0.2 K/UL (ref 0–0.4)
EOSINOPHIL NFR BLD: 4 % (ref 0–7)
ERYTHROCYTE [DISTWIDTH] IN BLOOD BY AUTOMATED COUNT: 12.6 % (ref 11.5–14.5)
GLOBULIN SER CALC-MCNC: 3.6 G/DL (ref 2–4)
GLUCOSE SERPL-MCNC: 118 MG/DL (ref 65–100)
HCT VFR BLD AUTO: 37.4 % (ref 35–47)
HGB BLD-MCNC: 12.3 G/DL (ref 11.5–16)
IMM GRANULOCYTES # BLD AUTO: 0 K/UL (ref 0–0.04)
IMM GRANULOCYTES NFR BLD AUTO: 0 % (ref 0–0.5)
LYMPHOCYTES # BLD: 1 K/UL (ref 0.8–3.5)
LYMPHOCYTES NFR BLD: 24 % (ref 12–49)
MCH RBC QN AUTO: 29.3 PG (ref 26–34)
MCHC RBC AUTO-ENTMCNC: 32.9 G/DL (ref 30–36.5)
MCV RBC AUTO: 89 FL (ref 80–99)
MONOCYTES # BLD: 0.3 K/UL (ref 0–1)
MONOCYTES NFR BLD: 7 % (ref 5–13)
NEUTS SEG # BLD: 2.6 K/UL (ref 1.8–8)
NEUTS SEG NFR BLD: 64 % (ref 32–75)
NRBC # BLD: 0 K/UL (ref 0–0.01)
NRBC BLD-RTO: 0 PER 100 WBC
PLATELET # BLD AUTO: 126 K/UL (ref 150–400)
PMV BLD AUTO: 9.8 FL (ref 8.9–12.9)
POTASSIUM SERPL-SCNC: 3.3 MMOL/L (ref 3.5–5.1)
PROT SERPL-MCNC: 7.6 G/DL (ref 6.4–8.2)
RBC # BLD AUTO: 4.2 M/UL (ref 3.8–5.2)
SODIUM SERPL-SCNC: 140 MMOL/L (ref 136–145)
WBC # BLD AUTO: 4 K/UL (ref 3.6–11)

## 2021-06-15 PROCEDURE — 96375 TX/PRO/DX INJ NEW DRUG ADDON: CPT

## 2021-06-15 PROCEDURE — 36415 COLL VENOUS BLD VENIPUNCTURE: CPT

## 2021-06-15 PROCEDURE — 85025 COMPLETE CBC W/AUTO DIFF WBC: CPT

## 2021-06-15 PROCEDURE — 77030012965 HC NDL HUBR BBMI -A

## 2021-06-15 PROCEDURE — 74011250637 HC RX REV CODE- 250/637: Performed by: INTERNAL MEDICINE

## 2021-06-15 PROCEDURE — 74011250636 HC RX REV CODE- 250/636: Performed by: INTERNAL MEDICINE

## 2021-06-15 PROCEDURE — 80053 COMPREHEN METABOLIC PANEL: CPT

## 2021-06-15 PROCEDURE — 96413 CHEMO IV INFUSION 1 HR: CPT

## 2021-06-15 RX ORDER — SODIUM CHLORIDE 9 MG/ML
25 INJECTION, SOLUTION INTRAVENOUS CONTINUOUS
Status: DISCONTINUED | OUTPATIENT
Start: 2021-06-15 | End: 2021-06-16 | Stop reason: HOSPADM

## 2021-06-15 RX ORDER — SODIUM CHLORIDE 9 MG/ML
10 INJECTION INTRAMUSCULAR; INTRAVENOUS; SUBCUTANEOUS AS NEEDED
Status: DISCONTINUED | OUTPATIENT
Start: 2021-06-15 | End: 2021-06-16 | Stop reason: HOSPADM

## 2021-06-15 RX ORDER — POTASSIUM CHLORIDE 1.5 G/1.77G
40 POWDER, FOR SOLUTION ORAL ONCE
Status: COMPLETED | OUTPATIENT
Start: 2021-06-15 | End: 2021-06-15

## 2021-06-15 RX ORDER — SODIUM CHLORIDE 0.9 % (FLUSH) 0.9 %
10 SYRINGE (ML) INJECTION AS NEEDED
Status: DISCONTINUED | OUTPATIENT
Start: 2021-06-15 | End: 2021-06-16 | Stop reason: HOSPADM

## 2021-06-15 RX ORDER — HEPARIN 100 UNIT/ML
300-500 SYRINGE INTRAVENOUS AS NEEDED
Status: DISCONTINUED | OUTPATIENT
Start: 2021-06-15 | End: 2021-06-16 | Stop reason: HOSPADM

## 2021-06-15 RX ORDER — POTASSIUM CHLORIDE 750 MG/1
40 TABLET, FILM COATED, EXTENDED RELEASE ORAL
Status: DISCONTINUED | OUTPATIENT
Start: 2021-06-15 | End: 2021-06-15

## 2021-06-15 RX ORDER — ONDANSETRON 2 MG/ML
8 INJECTION INTRAMUSCULAR; INTRAVENOUS ONCE
Status: COMPLETED | OUTPATIENT
Start: 2021-06-15 | End: 2021-06-15

## 2021-06-15 RX ADMIN — Medication 500 UNITS: at 15:58

## 2021-06-15 RX ADMIN — ADO-TRASTUZUMAB EMTANSINE 200 MG: 20 INJECTION, POWDER, LYOPHILIZED, FOR SOLUTION INTRAVENOUS at 15:27

## 2021-06-15 RX ADMIN — ONDANSETRON 8 MG: 2 INJECTION INTRAMUSCULAR; INTRAVENOUS at 14:51

## 2021-06-15 RX ADMIN — SODIUM CHLORIDE 25 ML/HR: 9 INJECTION, SOLUTION INTRAVENOUS at 14:51

## 2021-06-15 RX ADMIN — Medication 10 ML: at 15:57

## 2021-06-15 RX ADMIN — POTASSIUM CHLORIDE 40 MEQ: 1.5 POWDER, FOR SOLUTION ORAL at 14:57

## 2021-06-15 NOTE — PROGRESS NOTES
[facility] VISIT NOTE    1325. Pt arrived at [facility] ambulatory and in no distress for C10D1 Kadcyla. Assessment completed, pt with no acute complaints or concerns. RCW chest port accessed with . 75 in le with no difficulty. Positive blood return noted and labs drawn. Labs resulted and are within parameters to treat. Received TORB for 36 Meq of Potassium Chloride from MD office. Order placed in [facility]    Medications received:  NS KVO  Zofran IV  Potassium Chloride PO  Kadcyla IV    Tolerated treatment well, no adverse reaction noted. Port de-accessed and flushed per protocol. Positive blood return noted. Patient Vitals for the past 12 hrs:   Temp Pulse Resp BP SpO2   06/15/21 1558  (!) 51  (!) 153/88    06/15/21 1327 97.6 °F (36.4 °C) (!) 58 18 (!) 146/80 96 %     Recent Results (from the past 12 hour(s))   CBC WITH AUTOMATED DIFF    Collection Time: 06/15/21  1:36 PM   Result Value Ref Range    WBC 4.0 3.6 - 11.0 K/uL    RBC 4.20 3.80 - 5.20 M/uL    HGB 12.3 11.5 - 16.0 g/dL    HCT 37.4 35.0 - 47.0 %    MCV 89.0 80.0 - 99.0 FL    MCH 29.3 26.0 - 34.0 PG    MCHC 32.9 30.0 - 36.5 g/dL    RDW 12.6 11.5 - 14.5 %    PLATELET 013 (L) 028 - 400 K/uL    MPV 9.8 8.9 - 12.9 FL    NRBC 0.0 0  WBC    ABSOLUTE NRBC 0.00 0.00 - 0.01 K/uL    NEUTROPHILS 64 32 - 75 %    LYMPHOCYTES 24 12 - 49 %    MONOCYTES 7 5 - 13 %    EOSINOPHILS 4 0 - 7 %    BASOPHILS 1 0 - 1 %    IMMATURE GRANULOCYTES 0 0.0 - 0.5 %    ABS. NEUTROPHILS 2.6 1.8 - 8.0 K/UL    ABS. LYMPHOCYTES 1.0 0.8 - 3.5 K/UL    ABS. MONOCYTES 0.3 0.0 - 1.0 K/UL    ABS. EOSINOPHILS 0.2 0.0 - 0.4 K/UL    ABS. BASOPHILS 0.0 0.0 - 0.1 K/UL    ABS. IMM.  GRANS. 0.0 0.00 - 0.04 K/UL    DF AUTOMATED     METABOLIC PANEL, COMPREHENSIVE    Collection Time: 06/15/21  1:36 PM   Result Value Ref Range    Sodium 140 136 - 145 mmol/L    Potassium 3.3 (L) 3.5 - 5.1 mmol/L    Chloride 105 97 - 108 mmol/L    CO2 30 21 - 32 mmol/L    Anion gap 5 5 - 15 mmol/L    Glucose 118 (H) 65 - 100 mg/dL    BUN 13 6 - 20 MG/DL    Creatinine 0.79 0.55 - 1.02 MG/DL    BUN/Creatinine ratio 16 12 - 20      GFR est AA >60 >60 ml/min/1.73m2    GFR est non-AA >60 >60 ml/min/1.73m2    Calcium 9.0 8.5 - 10.1 MG/DL    Bilirubin, total 0.5 0.2 - 1.0 MG/DL    ALT (SGPT) 25 12 - 78 U/L    AST (SGOT) 34 15 - 37 U/L    Alk. phosphatase 72 45 - 117 U/L    Protein, total 7.6 6.4 - 8.2 g/dL    Albumin 4.0 3.5 - 5.0 g/dL    Globulin 3.6 2.0 - 4.0 g/dL    A-G Ratio 1.1 1.1 - 2.2       1600. D/C'd from [facility] ambulatory and in no distress.  Next appointment is 7/6/21 at 10:00 am.

## 2021-06-16 ENCOUNTER — HOSPITAL ENCOUNTER (OUTPATIENT)
Dept: PHYSICAL THERAPY | Age: 41
Discharge: HOME | End: 2021-06-16
Attending: INTERNAL MEDICINE
Payer: COMMERCIAL

## 2021-06-16 DIAGNOSIS — C50.112 MALIGNANT NEOPLASM OF CENTRAL PORTION OF LEFT BREAST IN FEMALE, ESTROGEN RECEPTOR POSITIVE (HCC): ICD-10-CM

## 2021-06-16 DIAGNOSIS — Z17.0 MALIGNANT NEOPLASM OF CENTRAL PORTION OF LEFT BREAST IN FEMALE, ESTROGEN RECEPTOR POSITIVE (HCC): ICD-10-CM

## 2021-06-16 PROCEDURE — 97162 PT EVAL MOD COMPLEX 30 MIN: CPT | Performed by: PHYSICAL THERAPIST

## 2021-06-16 PROCEDURE — 97110 THERAPEUTIC EXERCISES: CPT | Performed by: PHYSICAL THERAPIST

## 2021-06-16 NOTE — PROGRESS NOTES
Physical Therapy at [facility],   a part of [address] [facility]  [address] [facility], [address]  Phone: [phone]  Fax: [phone]    Plan of Care/Statement of Necessity for Physical Therapy Services  -15    Patient name: Andrew Odonnell  : 1980  Provider#: [id number]  Referral source: Shahriar Mclain MD      Medical/Treatment Diagnosis: Malignant neoplasm of central portion of left breast in female, estrogen receptor positive ([facility] [address] [C50.112, Z17.0] L shoulder pain M25.512     Prior Hospitalization: see medical history     Comorbidities: L breast cancer  Prior Level of Function: No difficulties or pain when using L UE  Medications: Verified on Patient Summary List    Start of Care: 2021      Onset Date: 2021       The Plan of Care and following information is based on the information from the initial evaluation. Assessment/ key information: Pt is a 42 y.o female s/p treatments for L breast cancer whose chief c/o is tightness and pulling when using L UE. Pt presents with decreased L upper quadrant motion and strength, palpable OMAR and impaired posture and breath pattern.  Patient will benefit from skilled PT services to address functional mobility deficits, address ROM deficits, address strength deficits, analyze and address soft tissue restrictions, analyze and cue movement patterns, analyze and modify body mechanics/ergonomics, assess and modify postural abnormalities and instruct in home and community integration to address rehab goals per plan of care    Evaluation Complexity History MEDIUM  Complexity : 1-2 comorbidities / personal factors will impact the outcome/ POC ; Examination MEDIUM Complexity : 3 Standardized tests and measures addressing body structure, function, activity limitation and / or participation in recreation  ;Presentation LOW Complexity : Stable, uncomplicated  ;Clinical Decision Making MEDIUM Complexity : FOTO score of 26-74  Overall Complexity Rating: MEDIUM    Problem List: pain affecting function, decrease ROM, decrease strength and decrease ADL/ functional abilitiies   Treatment Plan may include any combination of the following: Therapeutic exercise, Therapeutic activities, Neuromuscular re-education, Physical agent/modality, Manual therapy, Patient education and Functional mobility training  Patient / Family readiness to learn indicated by: asking questions, trying to perform skills and interest  Persons(s) to be included in education: patient (P)  Barriers to Learning/Limitations: None  Patient Goal (s): full motion and no discomfort  Patient Self Reported Health Status: good  Rehabilitation Potential: good    Short Term Goals: To be accomplished in 2 treatments:  1) Pt will be Independent with skin care routine to decrease risk of infection. 2) Pt will know which signs and symptoms to report immediately to physician concerning their condition.     Long Term Goals: To be accomplished in 20 treatments:  1) Pt will be have 0/10 pain when using L  UE for lifting, pushing, pulling, gripping, and all ADL's requiring use of extremity at or above shoulder level.     2) Pt will be Independent with HEP for pain management, utilizing correct breath pattern, strengthening, and motion exercises. 3) Pt will utilize correct breath and movement patterns during all ADL's and exercises involving reaching at or above shoulder level with 0/10 pain. 4) Pt will have 0/10 pain and at end range L shoulder elevation, ER, and Abduction in order to perform all ADL's and motor skills required of her job without discomfort. Frequency / Duration: Patient to be seen 1-2 times per week for 20 treatments.     Patient/ Caregiver education and instruction: exercises    [x]  Plan of care has been reviewed with ROSE    [address] [place], PT 6/16/2021   ________________________________________________________________________    I certify that the above Therapy Services are being furnished while the patient is under my care. I agree with the treatment plan and certify that this therapy is necessary.     Physician's Signature:____________________  Date:____________Time: _________      Butch Espinoza MD

## 2021-06-16 NOTE — PROGRESS NOTES
PT ONCOLOGY EVAL/DAILY NOTE    Patient Name: Chelsey Robledo  Date:2021  : 1980  [x]  Patient  Verified  Payor: BLUE CROSS / Plan: Ayleen Dudley [id number] PPO / Product Type: PPO /    In time:4:10  Out time:5:10  Total Treatment Time (min): 60  Total Timed Codes (min): 45     Visit #:  20    Treatment Area: Malignant neoplasm of central portion of left breast in female, estrogen receptor positive ([facility] [address] [C50.112, Z17.0] L shoulder pain M25.512    SUBJECTIVE    Current symptoms/Complaints: Pt reports that following B mastectomies in 2020 and chemo and radiation, she saw lymphedema therapist Crissy Mirza at [facility] to address her axillary cording,  got better. Started AI and started having joint pain, returned to work as a  and noticed that she still does not have full motion. She is now off AI. She continues now on AI , off AI, getting Lupron infusions through Sept.          Subjective functional status:     Present Symptoms/History:   Past Medical History:   Diagnosis Date    Anxiety      Cancer [facility])      Essential hypertension      HX OTHER MEDICAL ,          Infertility       IVF with first pregnancy    Infertility, female      Joint pain      Psychiatric problem              Past Surgical History:   Procedure Laterality Date    HX BILATERAL MASTECTOMY       HX OTHER SURGICAL         [place] Teeth Removal    HX OTHER SURGICAL         2017 Excision of mole on toe with skin graph        Referring Provider: Sallie Finch MD   Medical Oncologist: Tawanan Gilbert   Radiation Oncologist: Dr Marlen Schwarz   Plastic Surgeon: Dr Miles Buck   Breast Surgeon: Dr Ora Walls   Primary Care Physician: Stefano Payan MD  Next Appointment: none scheduled  Diagnosis: L OMAR, L shoulder pain  Precautions/Indications: lymphedema risk  History of Present Illness:  Treatment History:   · 20 left breast ax core bx:   Adenocarcinoma, ER + at 86% ; MS + at 69%; HER 2 POSITIVE (IHC 2+, FISH ratio 3.2; sig/cell 6.08), ki67 29%  · 5/21/20 L breast core bx: Atypical 1 mm focus, highly suspicious for [facility], lobular features, the tumor does not histologically match the patient's prior axillary cancer, but could represent a small sample of the same tumor  · 5/27/20 L breast excisional bx: Subareolar lumpectomy, IDC, 1.6 cm, invasive tumor present at anterior and lateral margins, gr 2, + LVI, 1/1 LN involved, 1.1 cm, ER + at 48%, RI + at 72%, HER 2 POSITIVE (IHC 2+, FISH ratio 2, sig/cell 4.14)  · 5/20/20 [place] genetic testing:  negative  · [facility] 6/1/2020-9/14/2020  · 11/12/2020 Bilateral mastectomy: Right breast: benign. Left breast: Inflammation, no residual carcinoma, 2/6 LNs (micromets in both, 1.7 mm, and 1.1 mm). pT1c ypN1mi cM0  · T-DM1 12/8/20-  · XRT 12/17/20-1/27/21   · Anastrozole 2/2021-4/30/21 (menses returned)  · lupron 5/25/21-              Pain Intensity:3 on a scale of 0-10  · Pain Aggravating Factors: \"extreme reaching out to side or overhead\"  · Pain Relieving Factors: \"not reaching\"      Has your activity changed since diagnosis?    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 Right: Yes        Leftt: Yes         Upper Extremity AROM:                                                                                 R                                 L  Shoulder Flexion   175                               169 with end range \"pulling\"                                                      Shoulder Abduction  165                               152 with end range \"pulling\"  Shoulder Extension  15                               15    Shoulder ER   35                               30    Shoulder IR                        30                               25                                                                                                                                 UPPER QUARTER                           MUSCLE STRENGTH  KEY                                                              R            L  0 - No Contraction        C1, C2 Neck Flex 4                                       1 - Trace                       C3 Side Flex          4           4                                                 2 - Poor                         C4 Sh Elev     4           4                                             3 - Fair                          C5 Deltoid/Biceps  4   4                                   4 - Good                       C6 Wrist Ext           4   4                                            5 - Normal                     C7 Triceps             4   4-                                                                                  C8 Thumb Ext        4   4                                                                                          T1 Hand Inst     4   4                                             MMT: See above      R  L  Shoulder flexion  4    4-     Shoulder ER  4    4-    Shoulder IR  4    4-      Neurological: Sensations: Light touch:  Intact except L axilla                     Palpation: Palpable cords from axillary lymph node dissection to mid forearm  Posture: L IR @g-h jt  Breath pattern assessment  Diaphragm: able to initiate; not primary  Anterior chest:primary   Accessory respiratory muscle use: none       45 min Therapeutic Exercise:  [x] See flow sheet :   Rationale: increase ROM and increase strength to improve the patients ability to perform ADL's required for return to work and/or community             With   [x] TE   [] TA   [] neuro   [] other: Patient Education: [] Review HEP    [] Progressed/Changed HEP based on:   [x] positioning   [] body mechanics   [] transfers   [] heat/ice application    [x] other: lymphedema risk reduction education     Other Objective/Functional Measures:       Pain Level (0-10 scale) post treatment: 2-3    ASSESSMENT/Changes in Function: Pt is a 42 y.o female s/p treatments for L breast cancer whose chief c/o is tightness and pulling when using L UE. Pt presents with decreased L upper quadrant motion and strength, palpable OMAR and impaired posture and breath pattern.  Patient will benefit from skilled PT services to address functional mobility deficits, address ROM deficits, address strength deficits, analyze and address soft tissue restrictions, analyze and cue movement patterns, analyze and modify body mechanics/ergonomics, assess and modify postural abnormalities and instruct in home and community integration to address rehab goals per plan of care          Goals:  See Plan of Care     PLAN  []  Upgrade activities as tolerated     [x]  Continue plan of care  []  Update interventions per flow sheet       []  Discharge due to:_  []  Other:_      Vladimir Matta PT 6/16/2021  4:45 PM

## 2021-06-24 ENCOUNTER — HOSPITAL ENCOUNTER (OUTPATIENT)
Dept: PHYSICAL THERAPY | Age: 41
Discharge: HOME | End: 2021-06-24
Attending: INTERNAL MEDICINE
Payer: COMMERCIAL

## 2021-06-24 PROCEDURE — 97140 MANUAL THERAPY 1/> REGIONS: CPT | Performed by: PHYSICAL THERAPIST

## 2021-06-24 PROCEDURE — 97110 THERAPEUTIC EXERCISES: CPT | Performed by: PHYSICAL THERAPIST

## 2021-06-24 NOTE — PROGRESS NOTES
PT DAILY ONCOLOGY TREATMENT NOTE 2-15    Patient Name: Kike Beaver  Date:2021  : 1980  [x]  Patient  Verified  Payor: BLUE CROSS / Plan: Ayleen Dudley [id number] PPO / Product Type: PPO /    In time:1105  Out time:1205  Total Treatment Time (min): 55  Total Timed Codes (min): 55  1:1 Treatment Time ( only): 55   Visit #: 2     Treatment Area: pain in left breast    SUBJECTIVE  Pain Level (0-10 scale), subjective functional status/changes: Pt reports 0/10 pain. .. tightness in certain positions with R UE. When she works out with [employer] she feels a lot weaker in left arm. She does try to avoid overhead exercises. Any medication changes, allergies to medications, adverse drug reactions, diagnosis change, or new procedure performed?: [x] No    [] Yes (see summary sheet for update) SEE ABOVE. OBJECTIVE         30 min Therapeutic Exercise:  [x] See flow sheet : progressed per flow sheet:  Swiss ball roll out seated and standing, pulleys added today. Rationale: increase strength, improve coordination and increase proprioception to improve the atients ability to exercise, reach overhead without symptoms. 25 min Manual Therapy:  Manual PROM flexion, ABD with stabilization of scap. To facilitate stretch, Gentle median nerve glide for gliding of cording, STM to cording at axilla region and arm. Rationale: decrease pain, increase tissue extensibility and decrease trigger points  to improve the patients ability to exercise, reach [facility]. With   [] TE   [] TA   [] neuro   [] other: Patient Education: [x] Review HEP    [] Progressed/Changed HEP based on:   [] positioning   [] body mechanics   [] transfers   [] heat/ice application    [] other:        Pain Level (0-10 scale) post treatment: 0, sore like L arm has been worked. ASSESSMENT/Changes in Function:   Pt compliant with HEP from day 1 from primary therapist and did well throughout f/u session today.   Pt with increased tightness at end range flexion and abduction. Patient will continue to benefit from skilled PT services to modify and progress therapeutic interventions, address functional mobility deficits, address ROM deficits, address strength deficits, analyze and address soft tissue restrictions, assess and modify postural abnormalities and instruct in home and community integration to attain remaining goals. Progress towards goals / Updated goals:  NT today.      PLAN      [x]  Continue plan of care    []  Other:_      Randy Sanchez, PT DPT, OCS 6/24/2021  03:01 AM       PT License #[id number]

## 2021-06-28 ENCOUNTER — HOSPITAL ENCOUNTER (OUTPATIENT)
Dept: PHYSICAL THERAPY | Age: 41
Discharge: HOME | End: 2021-06-28
Attending: INTERNAL MEDICINE
Payer: COMMERCIAL

## 2021-06-28 PROCEDURE — 97110 THERAPEUTIC EXERCISES: CPT | Performed by: PHYSICAL THERAPIST

## 2021-06-28 PROCEDURE — 97140 MANUAL THERAPY 1/> REGIONS: CPT | Performed by: PHYSICAL THERAPIST

## 2021-06-28 NOTE — PROGRESS NOTES
PT DAILY ONCOLOGY TREATMENT NOTE 2-15    Patient Name: Juliana Wade  Date:2021  :   [x]  Patient  Verified  Payor: BLUE CROSS / Plan: Ayleen Dudley [id number] PPO / Product Type: PPO /    In time:1120  Out time:1223  Total Treatment Time (min):60   Total Timed Codes (min): 60  1:1 Treatment Time [facility] only):60    Visit #: 2     Treatment Area: pain in left breast    SUBJECTIVE  Pain Level (0-10 scale), subjective functional status/changes: Pt reports 0/10 pain. .. Pt reports she has had some more soreness (medial) left elbow. She felt OK after last appointment. Any medication changes, allergies to medications, adverse drug reactions, diagnosis change, or new procedure performed?: [x] No    [] Yes (see summary sheet for update) SEE ABOVE. OBJECTIVE         30 min Therapeutic Exercise:  [x] See flow sheet : progressed per flow sheet:    Added foam roller pec stretching per flow sheet. Added UBE. Rationale: increase strength, improve coordination and increase proprioception to improve the atients ability to exercise, reach overhead without symptoms. 30 min Manual Therapy:  Manual PROM flexion, ABD with stabilization of scap. To facilitate stretch, Gentle median nerve glide for gliding of cording, STM to cording at axilla region and arm   Rationale: decrease pain, increase tissue extensibility and decrease trigger points  to improve the patients ability to exercise, reach [facility]. With   [] TE   [] TA   [] neuro   [] other: Patient Education: [x] Review HEP    [] Progressed/Changed HEP based on:   [] positioning   [] body mechanics   [] transfers   [] heat/ice application    [] other:        Pain Level (0-10 scale) post treatment: 0    ASSESSMENT/Changes in Function:   Pt notes decreased stiffness/soreness with folding/unfolding following manual therapy interventions. She did well with addition of foam roller and UBE today.      Patient will continue to benefit from skilled PT services to modify and progress therapeutic interventions, address functional mobility deficits, address ROM deficits, address strength deficits, analyze and address soft tissue restrictions, assess and modify postural abnormalities and instruct in home and community integration to attain remaining goals. Progress towards goals / Updated goals:  NT today.      PLAN      [x]  Continue plan of care    []  Other:_      Jaquan Robertson, PT DPT, OCS 6/28/2021  88:21 AM       PT License #[id number]

## 2021-06-29 RX ORDER — DIPHENHYDRAMINE HYDROCHLORIDE 50 MG/ML
25 INJECTION, SOLUTION INTRAMUSCULAR; INTRAVENOUS AS NEEDED
Status: CANCELLED
Start: 2021-07-06

## 2021-06-29 RX ORDER — ALBUTEROL SULFATE 0.83 MG/ML
2.5 SOLUTION RESPIRATORY (INHALATION) AS NEEDED
Status: CANCELLED
Start: 2021-07-06

## 2021-06-29 RX ORDER — DIPHENHYDRAMINE HYDROCHLORIDE 50 MG/ML
50 INJECTION, SOLUTION INTRAMUSCULAR; INTRAVENOUS AS NEEDED
Status: CANCELLED
Start: 2021-07-06

## 2021-06-29 RX ORDER — ONDANSETRON 2 MG/ML
8 INJECTION INTRAMUSCULAR; INTRAVENOUS AS NEEDED
Status: CANCELLED | OUTPATIENT
Start: 2021-07-06

## 2021-06-29 RX ORDER — ACETAMINOPHEN 325 MG/1
650 TABLET ORAL AS NEEDED
Status: CANCELLED
Start: 2021-07-06

## 2021-06-29 RX ORDER — HYDROCORTISONE SODIUM SUCCINATE 100 MG/2ML
100 INJECTION, POWDER, FOR SOLUTION INTRAMUSCULAR; INTRAVENOUS AS NEEDED
Status: CANCELLED | OUTPATIENT
Start: 2021-07-06

## 2021-06-29 RX ORDER — EPINEPHRINE 1 MG/ML
0.3 INJECTION, SOLUTION, CONCENTRATE INTRAVENOUS AS NEEDED
Status: CANCELLED | OUTPATIENT
Start: 2021-07-06

## 2021-07-01 ENCOUNTER — OFFICE VISIT (OUTPATIENT)
Dept: ONCOLOGY | Age: 41
End: 2021-07-01

## 2021-07-01 VITALS
SYSTOLIC BLOOD PRESSURE: 171 MMHG | HEART RATE: 58 BPM | OXYGEN SATURATION: 99 % | RESPIRATION RATE: 18 BRPM | DIASTOLIC BLOOD PRESSURE: 109 MMHG

## 2021-07-01 DIAGNOSIS — Z17.0 MALIGNANT NEOPLASM OF CENTRAL PORTION OF LEFT BREAST IN FEMALE, ESTROGEN RECEPTOR POSITIVE (HCC): Primary | ICD-10-CM

## 2021-07-01 DIAGNOSIS — C50.112 MALIGNANT NEOPLASM OF CENTRAL PORTION OF LEFT BREAST IN FEMALE, ESTROGEN RECEPTOR POSITIVE (HCC): Primary | ICD-10-CM

## 2021-07-01 NOTE — PROGRESS NOTES
Mila Corona is a 42 y. o. female here for office visit and Lupron injection.  Lupron injection in a sealed container from her local pharmacy. Lupron 3.75 mg given IM in left buttock without difficulty.  Patient tolerated well.  Patient to schedule next injection for 28 days.

## 2021-07-05 RX ORDER — HEPARIN 100 UNIT/ML
300-500 SYRINGE INTRAVENOUS AS NEEDED
Status: CANCELLED
Start: 2021-07-27

## 2021-07-05 RX ORDER — HYDROCORTISONE SODIUM SUCCINATE 100 MG/2ML
100 INJECTION, POWDER, FOR SOLUTION INTRAMUSCULAR; INTRAVENOUS AS NEEDED
Status: CANCELLED | OUTPATIENT
Start: 2021-08-17

## 2021-07-05 RX ORDER — DIPHENHYDRAMINE HYDROCHLORIDE 50 MG/ML
50 INJECTION, SOLUTION INTRAMUSCULAR; INTRAVENOUS AS NEEDED
Status: CANCELLED
Start: 2021-08-17

## 2021-07-05 RX ORDER — ALBUTEROL SULFATE 0.83 MG/ML
2.5 SOLUTION RESPIRATORY (INHALATION) AS NEEDED
Status: CANCELLED
Start: 2021-08-17

## 2021-07-05 RX ORDER — ONDANSETRON 2 MG/ML
8 INJECTION INTRAMUSCULAR; INTRAVENOUS AS NEEDED
Status: CANCELLED | OUTPATIENT
Start: 2021-08-17

## 2021-07-05 RX ORDER — HYDROCORTISONE SODIUM SUCCINATE 100 MG/2ML
100 INJECTION, POWDER, FOR SOLUTION INTRAMUSCULAR; INTRAVENOUS AS NEEDED
Status: CANCELLED | OUTPATIENT
Start: 2021-07-27

## 2021-07-05 RX ORDER — SODIUM CHLORIDE 0.9 % (FLUSH) 0.9 %
10 SYRINGE (ML) INJECTION AS NEEDED
Status: CANCELLED | OUTPATIENT
Start: 2021-07-27

## 2021-07-05 RX ORDER — ACETAMINOPHEN 325 MG/1
650 TABLET ORAL AS NEEDED
Status: CANCELLED
Start: 2021-08-17

## 2021-07-05 RX ORDER — HEPARIN 100 UNIT/ML
300-500 SYRINGE INTRAVENOUS AS NEEDED
Status: CANCELLED
Start: 2021-08-17

## 2021-07-05 RX ORDER — ONDANSETRON 2 MG/ML
8 INJECTION INTRAMUSCULAR; INTRAVENOUS ONCE
Status: CANCELLED | OUTPATIENT
Start: 2021-08-17 | End: 2021-08-17

## 2021-07-05 RX ORDER — SODIUM CHLORIDE 9 MG/ML
10 INJECTION INTRAMUSCULAR; INTRAVENOUS; SUBCUTANEOUS AS NEEDED
Status: CANCELLED | OUTPATIENT
Start: 2021-08-17

## 2021-07-05 RX ORDER — DIPHENHYDRAMINE HYDROCHLORIDE 50 MG/ML
25 INJECTION, SOLUTION INTRAMUSCULAR; INTRAVENOUS AS NEEDED
Status: CANCELLED
Start: 2021-07-27

## 2021-07-05 RX ORDER — SODIUM CHLORIDE 0.9 % (FLUSH) 0.9 %
10 SYRINGE (ML) INJECTION AS NEEDED
Status: CANCELLED | OUTPATIENT
Start: 2021-08-17

## 2021-07-05 RX ORDER — ONDANSETRON 2 MG/ML
8 INJECTION INTRAMUSCULAR; INTRAVENOUS AS NEEDED
Status: CANCELLED | OUTPATIENT
Start: 2021-07-27

## 2021-07-05 RX ORDER — ACETAMINOPHEN 325 MG/1
650 TABLET ORAL AS NEEDED
Status: CANCELLED
Start: 2021-07-27

## 2021-07-05 RX ORDER — ONDANSETRON 2 MG/ML
8 INJECTION INTRAMUSCULAR; INTRAVENOUS ONCE
Status: CANCELLED | OUTPATIENT
Start: 2021-07-27 | End: 2021-07-27

## 2021-07-05 RX ORDER — SODIUM CHLORIDE 9 MG/ML
25 INJECTION, SOLUTION INTRAVENOUS CONTINUOUS
Status: CANCELLED | OUTPATIENT
Start: 2021-08-17

## 2021-07-05 RX ORDER — EPINEPHRINE 1 MG/ML
0.3 INJECTION, SOLUTION, CONCENTRATE INTRAVENOUS AS NEEDED
Status: CANCELLED | OUTPATIENT
Start: 2021-07-27

## 2021-07-05 RX ORDER — SODIUM CHLORIDE 9 MG/ML
25 INJECTION, SOLUTION INTRAVENOUS CONTINUOUS
Status: CANCELLED | OUTPATIENT
Start: 2021-07-27

## 2021-07-05 RX ORDER — SODIUM CHLORIDE 9 MG/ML
10 INJECTION INTRAMUSCULAR; INTRAVENOUS; SUBCUTANEOUS AS NEEDED
Status: CANCELLED | OUTPATIENT
Start: 2021-07-27

## 2021-07-05 RX ORDER — EPINEPHRINE 1 MG/ML
0.3 INJECTION, SOLUTION, CONCENTRATE INTRAVENOUS AS NEEDED
Status: CANCELLED | OUTPATIENT
Start: 2021-08-17

## 2021-07-05 RX ORDER — DIPHENHYDRAMINE HYDROCHLORIDE 50 MG/ML
25 INJECTION, SOLUTION INTRAMUSCULAR; INTRAVENOUS AS NEEDED
Status: CANCELLED
Start: 2021-08-17

## 2021-07-05 RX ORDER — ALBUTEROL SULFATE 0.83 MG/ML
2.5 SOLUTION RESPIRATORY (INHALATION) AS NEEDED
Status: CANCELLED
Start: 2021-07-27

## 2021-07-05 RX ORDER — DIPHENHYDRAMINE HYDROCHLORIDE 50 MG/ML
50 INJECTION, SOLUTION INTRAMUSCULAR; INTRAVENOUS AS NEEDED
Status: CANCELLED
Start: 2021-07-27

## 2021-07-06 ENCOUNTER — OFFICE VISIT (OUTPATIENT)
Dept: ONCOLOGY | Age: 41
End: 2021-07-06
Payer: COMMERCIAL

## 2021-07-06 ENCOUNTER — HOSPITAL ENCOUNTER (OUTPATIENT)
Dept: INFUSION THERAPY | Age: 41
Discharge: HOME | End: 2021-07-06
Payer: COMMERCIAL

## 2021-07-06 VITALS
DIASTOLIC BLOOD PRESSURE: 106 MMHG | BODY MASS INDEX: 23.37 KG/M2 | HEART RATE: 72 BPM | OXYGEN SATURATION: 99 % | RESPIRATION RATE: 16 BRPM | WEIGHT: 127 LBS | SYSTOLIC BLOOD PRESSURE: 175 MMHG | HEIGHT: 62 IN | TEMPERATURE: 97 F

## 2021-07-06 VITALS
OXYGEN SATURATION: 99 % | RESPIRATION RATE: 16 BRPM | HEIGHT: 62 IN | WEIGHT: 127.4 LBS | DIASTOLIC BLOOD PRESSURE: 89 MMHG | TEMPERATURE: 97.7 F | BODY MASS INDEX: 23.45 KG/M2 | SYSTOLIC BLOOD PRESSURE: 173 MMHG | HEART RATE: 64 BPM

## 2021-07-06 DIAGNOSIS — C50.912 STAGE II BREAST CANCER, LEFT (HCC): Primary | ICD-10-CM

## 2021-07-06 LAB
ALBUMIN SERPL-MCNC: 4.3 G/DL (ref 3.5–5)
ALBUMIN/GLOB SERPL: 1.2 {RATIO} (ref 1.1–2.2)
ALP SERPL-CCNC: 73 U/L (ref 45–117)
ALT SERPL-CCNC: 27 U/L (ref 12–78)
ANION GAP SERPL CALC-SCNC: 2 MMOL/L (ref 5–15)
AST SERPL-CCNC: 43 U/L (ref 15–37)
BASOPHILS # BLD: 0.1 K/UL (ref 0–0.1)
BASOPHILS NFR BLD: 1 % (ref 0–1)
BILIRUB SERPL-MCNC: 0.9 MG/DL (ref 0.2–1)
BUN SERPL-MCNC: 11 MG/DL (ref 6–20)
BUN/CREAT SERPL: 15 (ref 12–20)
CALCIUM SERPL-MCNC: 9.1 MG/DL (ref 8.5–10.1)
CHLORIDE SERPL-SCNC: 104 MMOL/L (ref 97–108)
CO2 SERPL-SCNC: 32 MMOL/L (ref 21–32)
CREAT SERPL-MCNC: 0.72 MG/DL (ref 0.55–1.02)
DIFFERENTIAL METHOD BLD: ABNORMAL
EOSINOPHIL # BLD: 0.1 K/UL (ref 0–0.4)
EOSINOPHIL NFR BLD: 4 % (ref 0–7)
ERYTHROCYTE [DISTWIDTH] IN BLOOD BY AUTOMATED COUNT: 12.2 % (ref 11.5–14.5)
GLOBULIN SER CALC-MCNC: 3.5 G/DL (ref 2–4)
GLUCOSE SERPL-MCNC: 90 MG/DL (ref 65–100)
HCT VFR BLD AUTO: 40.5 % (ref 35–47)
HGB BLD-MCNC: 13.7 G/DL (ref 11.5–16)
IMM GRANULOCYTES # BLD AUTO: 0 K/UL (ref 0–0.04)
IMM GRANULOCYTES NFR BLD AUTO: 0 % (ref 0–0.5)
LYMPHOCYTES # BLD: 0.9 K/UL (ref 0.8–3.5)
LYMPHOCYTES NFR BLD: 23 % (ref 12–49)
MCH RBC QN AUTO: 29.9 PG (ref 26–34)
MCHC RBC AUTO-ENTMCNC: 33.8 G/DL (ref 30–36.5)
MCV RBC AUTO: 88.4 FL (ref 80–99)
MONOCYTES # BLD: 0.3 K/UL (ref 0–1)
MONOCYTES NFR BLD: 9 % (ref 5–13)
NEUTS SEG # BLD: 2.4 K/UL (ref 1.8–8)
NEUTS SEG NFR BLD: 63 % (ref 32–75)
NRBC # BLD: 0 K/UL (ref 0–0.01)
NRBC BLD-RTO: 0 PER 100 WBC
PLATELET # BLD AUTO: 130 K/UL (ref 150–400)
PMV BLD AUTO: 10.5 FL (ref 8.9–12.9)
POTASSIUM SERPL-SCNC: 3.6 MMOL/L (ref 3.5–5.1)
PROT SERPL-MCNC: 7.8 G/DL (ref 6.4–8.2)
RBC # BLD AUTO: 4.58 M/UL (ref 3.8–5.2)
SODIUM SERPL-SCNC: 138 MMOL/L (ref 136–145)
WBC # BLD AUTO: 3.9 K/UL (ref 3.6–11)

## 2021-07-06 PROCEDURE — 77030012965 HC NDL HUBR BBMI -A

## 2021-07-06 PROCEDURE — 85025 COMPLETE CBC W/AUTO DIFF WBC: CPT

## 2021-07-06 PROCEDURE — 99215 OFFICE O/P EST HI 40 MIN: CPT | Performed by: INTERNAL MEDICINE

## 2021-07-06 PROCEDURE — 96375 TX/PRO/DX INJ NEW DRUG ADDON: CPT

## 2021-07-06 PROCEDURE — 80053 COMPREHEN METABOLIC PANEL: CPT

## 2021-07-06 PROCEDURE — 74011250636 HC RX REV CODE- 250/636: Performed by: INTERNAL MEDICINE

## 2021-07-06 PROCEDURE — 36415 COLL VENOUS BLD VENIPUNCTURE: CPT

## 2021-07-06 PROCEDURE — 96413 CHEMO IV INFUSION 1 HR: CPT

## 2021-07-06 RX ORDER — HEPARIN 100 UNIT/ML
300-500 SYRINGE INTRAVENOUS AS NEEDED
Status: DISCONTINUED | OUTPATIENT
Start: 2021-07-06 | End: 2021-07-07 | Stop reason: HOSPADM

## 2021-07-06 RX ORDER — METOPROLOL TARTRATE 50 MG/1
50 TABLET ORAL 2 TIMES DAILY
Qty: 60 TABLET | Refills: 5 | Status: SHIPPED | OUTPATIENT
Start: 2021-07-06 | End: 2022-01-02

## 2021-07-06 RX ORDER — DIPHENHYDRAMINE HYDROCHLORIDE 50 MG/ML
50 INJECTION, SOLUTION INTRAMUSCULAR; INTRAVENOUS AS NEEDED
Status: CANCELLED
Start: 2021-09-07

## 2021-07-06 RX ORDER — ACETAMINOPHEN 325 MG/1
650 TABLET ORAL AS NEEDED
Status: CANCELLED
Start: 2021-09-07

## 2021-07-06 RX ORDER — ALBUTEROL SULFATE 0.83 MG/ML
2.5 SOLUTION RESPIRATORY (INHALATION) AS NEEDED
Status: CANCELLED
Start: 2021-09-07

## 2021-07-06 RX ORDER — HEPARIN 100 UNIT/ML
300-500 SYRINGE INTRAVENOUS AS NEEDED
Status: CANCELLED
Start: 2021-09-07

## 2021-07-06 RX ORDER — SODIUM CHLORIDE 0.9 % (FLUSH) 0.9 %
10 SYRINGE (ML) INJECTION AS NEEDED
Status: DISCONTINUED | OUTPATIENT
Start: 2021-07-06 | End: 2021-07-07 | Stop reason: HOSPADM

## 2021-07-06 RX ORDER — SODIUM CHLORIDE 9 MG/ML
10 INJECTION INTRAMUSCULAR; INTRAVENOUS; SUBCUTANEOUS AS NEEDED
Status: CANCELLED | OUTPATIENT
Start: 2021-09-07

## 2021-07-06 RX ORDER — DIPHENHYDRAMINE HYDROCHLORIDE 50 MG/ML
25 INJECTION, SOLUTION INTRAMUSCULAR; INTRAVENOUS AS NEEDED
Status: CANCELLED
Start: 2021-09-07

## 2021-07-06 RX ORDER — ONDANSETRON 2 MG/ML
8 INJECTION INTRAMUSCULAR; INTRAVENOUS AS NEEDED
Status: CANCELLED | OUTPATIENT
Start: 2021-09-07

## 2021-07-06 RX ORDER — SODIUM CHLORIDE 9 MG/ML
25 INJECTION, SOLUTION INTRAVENOUS CONTINUOUS
Status: DISCONTINUED | OUTPATIENT
Start: 2021-07-06 | End: 2021-07-07 | Stop reason: HOSPADM

## 2021-07-06 RX ORDER — SODIUM CHLORIDE 9 MG/ML
10 INJECTION INTRAMUSCULAR; INTRAVENOUS; SUBCUTANEOUS AS NEEDED
Status: DISCONTINUED | OUTPATIENT
Start: 2021-07-06 | End: 2021-07-07 | Stop reason: HOSPADM

## 2021-07-06 RX ORDER — SODIUM CHLORIDE 9 MG/ML
25 INJECTION, SOLUTION INTRAVENOUS CONTINUOUS
Status: CANCELLED | OUTPATIENT
Start: 2021-09-07

## 2021-07-06 RX ORDER — ONDANSETRON 2 MG/ML
8 INJECTION INTRAMUSCULAR; INTRAVENOUS ONCE
Status: CANCELLED | OUTPATIENT
Start: 2021-09-07 | End: 2021-09-07

## 2021-07-06 RX ORDER — EPINEPHRINE 1 MG/ML
0.3 INJECTION, SOLUTION, CONCENTRATE INTRAVENOUS AS NEEDED
Status: CANCELLED | OUTPATIENT
Start: 2021-09-07

## 2021-07-06 RX ORDER — SODIUM CHLORIDE 0.9 % (FLUSH) 0.9 %
10 SYRINGE (ML) INJECTION AS NEEDED
Status: CANCELLED | OUTPATIENT
Start: 2021-09-07

## 2021-07-06 RX ORDER — HYDROCORTISONE SODIUM SUCCINATE 100 MG/2ML
100 INJECTION, POWDER, FOR SOLUTION INTRAMUSCULAR; INTRAVENOUS AS NEEDED
Status: CANCELLED | OUTPATIENT
Start: 2021-09-07

## 2021-07-06 RX ORDER — ONDANSETRON 2 MG/ML
8 INJECTION INTRAMUSCULAR; INTRAVENOUS ONCE
Status: COMPLETED | OUTPATIENT
Start: 2021-07-06 | End: 2021-07-06

## 2021-07-06 RX ADMIN — HEPARIN 500 UNITS: 100 SYRINGE at 14:48

## 2021-07-06 RX ADMIN — SODIUM CHLORIDE 25 ML/HR: 9 INJECTION, SOLUTION INTRAVENOUS at 13:13

## 2021-07-06 RX ADMIN — SODIUM CHLORIDE 10 ML: 9 INJECTION INTRAMUSCULAR; INTRAVENOUS; SUBCUTANEOUS at 14:47

## 2021-07-06 RX ADMIN — ADO-TRASTUZUMAB EMTANSINE 200 MG: 20 INJECTION, POWDER, LYOPHILIZED, FOR SOLUTION INTRAVENOUS at 14:06

## 2021-07-06 RX ADMIN — ONDANSETRON 8 MG: 2 INJECTION INTRAMUSCULAR; INTRAVENOUS at 13:13

## 2021-07-06 NOTE — PROGRESS NOTES
1OPIC Progress Note    Date: 2021    Name: Essie Issa    MRN: [id number]         : 1980    Ms. Calvin Cervantes Arrived ambulatory and in no distress for C11D1 of Kadcyla Regimen. Assessment was completed, no acute issues at this time, no new complaints voiced. Left chest wall port accessed without difficulty, labs drawn & sent for processing. Chemotherapy Flowsheet 2021   Cycle C10D1   Date 2021   Drug / Regimen Kadcyla   Pre Meds given   Notes -     Patient Vitals for the past 12 hrs:   Temp Pulse Resp BP SpO2   21 1443 97.7 °F (36.5 °C) 64 16 (!) 173/89 99 %   21 1044 97 °F (36.1 °C) 72 16 (!) 175/106 99 %        Patient proceed to appointment with Dr. Meg Mcqueen. Ms. Mulligan's vitals were reviewed. Visit Vitals  BP (!) 175/106   Pulse 72   Temp 97 °F (36.1 °C)   Resp 16   Ht 5' 2\" (1.575 m)   Wt 57.8 kg (127 lb 6.4 oz)   SpO2 99%   Breastfeeding No   BMI 23.30 kg/m²       Lab results were obtained and reviewed. Recent Results (from the past 12 hour(s))   CBC WITH AUTOMATED DIFF    Collection Time: 21 10:33 AM   Result Value Ref Range    WBC 3.9 3.6 - 11.0 K/uL    RBC 4.58 3.80 - 5.20 M/uL    HGB 13.7 11.5 - 16.0 g/dL    HCT 40.5 35.0 - 47.0 %    MCV 88.4 80.0 - 99.0 FL    MCH 29.9 26.0 - 34.0 PG    MCHC 33.8 30.0 - 36.5 g/dL    RDW 12.2 11.5 - 14.5 %    PLATELET 768 (L) 476 - 400 K/uL    MPV 10.5 8.9 - 12.9 FL    NRBC 0.0 0  WBC    ABSOLUTE NRBC 0.00 0.00 - 0.01 K/uL    NEUTROPHILS 63 32 - 75 %    LYMPHOCYTES 23 12 - 49 %    MONOCYTES 9 5 - 13 %    EOSINOPHILS 4 0 - 7 %    BASOPHILS 1 0 - 1 %    IMMATURE GRANULOCYTES 0 0.0 - 0.5 %    ABS. NEUTROPHILS 2.4 1.8 - 8.0 K/UL    ABS. LYMPHOCYTES 0.9 0.8 - 3.5 K/UL    ABS. MONOCYTES 0.3 0.0 - 1.0 K/UL    ABS. EOSINOPHILS 0.1 0.0 - 0.4 K/UL    ABS. BASOPHILS 0.1 0.0 - 0.1 K/UL    ABS. IMM.  GRANS. 0.0 0.00 - 0.04 K/UL    DF AUTOMATED     METABOLIC PANEL, COMPREHENSIVE    Collection Time: 21 10:33 AM   Result Value Ref Range    Sodium 138 136 - 145 mmol/L    Potassium 3.6 3.5 - 5.1 mmol/L    Chloride 104 97 - 108 mmol/L    CO2 32 21 - 32 mmol/L    Anion gap 2 (L) 5 - 15 mmol/L    Glucose 90 65 - 100 mg/dL    BUN 11 6 - 20 MG/DL    Creatinine 0.72 0.55 - 1.02 MG/DL    BUN/Creatinine ratio 15 12 - 20      GFR est AA >60 >60 ml/min/1.73m2    GFR est non-AA >60 >60 ml/min/1.73m2    Calcium 9.1 8.5 - 10.1 MG/DL    Bilirubin, total 0.9 0.2 - 1.0 MG/DL    ALT (SGPT) 27 12 - 78 U/L    AST (SGOT) 43 (H) 15 - 37 U/L    Alk. phosphatase 73 45 - 117 U/L    Protein, total 7.8 6.4 - 8.2 g/dL    Albumin 4.3 3.5 - 5.0 g/dL    Globulin 3.5 2.0 - 4.0 g/dL    A-G Ratio 1.2 1.1 - 2.2         Medications:  Medications Administered     0.9% sodium chloride infusion     Admin Date  07/06/2021 Action  New Bag Dose  25 mL/hr Rate  25 mL/hr Route  IntraVENous Administered By  Veronica Keating RN          0.9% sodium chloride injection 10 mL     Admin Date  07/06/2021 Action  Given Dose  10 mL Route  IntraVENous Administered By  Veronica Keating RN          ADO-trastuzumab emtansine (KADCYLA) 200 mg in 0.9% sodium chloride 250 mL, overfill volume 25 mL Chemo infusion     Admin Date  07/06/2021 Action  New Bag Dose  200 mg Rate  570 mL/hr Route  IntraVENous Administered By  Veronica Keating RN          heparin (porcine) pf 300-500 Units     Admin Date  07/06/2021 Action  Given Dose  500 Units Route  InterCATHeter Administered By  Veronica Keating RN          ondansetron [facility]) injection 8 mg     Admin Date  07/06/2021 Action  Given Dose  8 mg Route  IntraVENous Administered By  Veronica Keating RN                Ms. Amanda Bledsoe tolerated treatment well and was discharged from [place] [address] in stable condition. Port de-accessed, flushed & heparinized per protocol.    Thomas Gonsalez RN  July 6, 2021

## 2021-07-06 NOTE — PROGRESS NOTES
Cancer [place] at [place] [address]  [address] [facility], [address]  [place] Signs: [phone]  F: [phone]    Reason for Visit:   Wood Ruiz is a 42 y.o. female who is seen for follow up of breast cancer    Breast surgeon:  Dr. Beckie Talbert surg:  Dr. Angela Thakur onc:  Dr. Jeff Caro    Treatment History:   · 20 left breast ax core bx: Adenocarcinoma, ER + at 86% ; LA + at 69%; HER 2 POSITIVE (IHC 2+, FISH ratio 3.2; sig/cell 6.08), ki67 29%  · 20 L breast core bx: Atypical 1 mm focus, highly suspicious for [facility], lobular features, the tumor does not histologically match the patient's prior axillary cancer, but could represent a small sample of the same tumor  · 20 L breast excisional bx: Subareolar lumpectomy, IDC, 1.6 cm, invasive tumor present at anterior and lateral margins, gr 2, + LVI, 1/1 LN involved, 1.1 cm, ER + at 48%, LA + at 72%, HER 2 POSITIVE (IHC 2+, FISH ratio 2, sig/cell 4.14)  · 20 Mian Parekh genetic testing:  negative  · TCHP 2020-2020  · 2020 Bilateral mastectomy: Right breast: benign. Left breast: Inflammation, no residual carcinoma, 2/6 LNs (micromets in both, 1.7 mm, and 1.1 mm). pT1c ypN1mi cM0  · T-DM1 20-  · XRT 20-21   · Anastrozole 2021-21 (menses returned); 21-  · lupron 21-    History of Present Illness:   She noticed a L axilla mass in 2020, leading to the pathology above. Interval history:  No complaints today other than high blood pressure. She hasn't started Arimidex as of yet. She will resume this now, received 2nd Lupron on Thursday, 21. S/p covid 19 vaccines    FH:   No breast, ovarian, prostate, or pancreas cancer    LMP 21    Past Medical History:   Diagnosis Date    Anxiety     Cancer [facility])     Essential hypertension     HX OTHER MEDICAL ,         Infertility     IVF with first pregnancy    Infertility, female     Joint pain     Psychiatric problem       Past Surgical History:   Procedure Laterality Date    HX BILATERAL MASTECTOMY  2020    HX OTHER SURGICAL      [place] Teeth Removal    HX OTHER SURGICAL      1/2017 Excision of mole on toe with skin graph      Social History     Tobacco Use    Smoking status: Never Smoker    Smokeless tobacco: Never Used   Substance Use Topics    Alcohol use: Yes      Family History   Problem Relation Age of Onset    Diabetes Mother     Heart Disease Mother     Hypertension Mother     Hypertension Father     Cancer Paternal Grandmother         Lung cancer    Stroke Paternal Grandmother     Cancer Paternal Grandfather         Melanoma     Current Outpatient Medications   Medication Sig    leuprolide depot (LUPRON) 3.75 mg injection 1 Each by IntraMUSCular route every four (4) weeks.  loratadine (Claritin) 10 mg tablet Take 10 mg by mouth.  fluticasone propionate (FLONASE ALLERGY RELIEF NA) by Nasal route.  carvediloL (COREG) 12.5 mg tablet TAKE 1 TABLET BY MOUTH TWICE A DAY WITH MEALS    anastrozole (ARIMIDEX) 1 mg tablet Take 1 mg by mouth daily.  acetaminophen (TYLENOL PO) Take  by mouth.  lidocaine-prilocaine (EMLA) topical cream Apply  to affected area as needed for Pain.  ondansetron (ZOFRAN ODT) 8 mg disintegrating tablet Take 1 Tab by mouth every eight (8) hours as needed for Nausea or Vomiting. For 2 days following chemo    ibuprofen (MOTRIN) 600 mg tablet Take 1 Tab by mouth every six (6) hours as needed for Pain.  sertraline (ZOLOFT) 25 mg tablet Take 25 mg by mouth daily.  PNV No.22-Iron Cbn&Gluc-FA-DOS 27-1-50 mg Tab Take  by mouth. Indications: PREGNANCY     No current facility-administered medications for this visit. No Known Allergies     Review of Systems: A complete review of systems was obtained, negative except as described above. Physical Exam:     Visit Vitals  BP (!) 175/106 Comment: On recheck it was[de-identified] 174/113.  Pt. reports dull headache. Pulse 72 Comment: On recheck it was[de-identified] 60.   Temp 97 °F (36.1 °C) (Temporal)   Resp 16   Ht 5' 2\" (1.575 m)   Wt 127 lb (57.6 kg)   SpO2 99%   BMI 23.23 kg/m²     ECOG PS: 0  General: no distress  Eyes: anicteric sclerae  HENT: oropharynx clear  Neck: supple  Lymphatic: no cervical, supraclavicular adenopathy  Respiratory: normal respiratory effort  CV: no peripheral edema  GI: soft, nontender, nondistended, no masses  Skin: no rashes; no ecchymoses; no petechiae    Results:     Lab Results   Component Value Date/Time    WBC 3.9 07/06/2021 10:33 AM    HGB 13.7 07/06/2021 10:33 AM    HCT 40.5 07/06/2021 10:33 AM    PLATELET 939 (L) 73/30/8817 10:33 AM    MCV 88.4 07/06/2021 10:33 AM    ABS. NEUTROPHILS 2.4 07/06/2021 10:33 AM     Lab Results   Component Value Date/Time    Sodium 138 07/06/2021 10:33 AM    Potassium 3.6 07/06/2021 10:33 AM    Chloride 104 07/06/2021 10:33 AM    CO2 32 07/06/2021 10:33 AM    Glucose 90 07/06/2021 10:33 AM    BUN 11 07/06/2021 10:33 AM    Creatinine 0.72 07/06/2021 10:33 AM    GFR est AA >60 07/06/2021 10:33 AM    GFR est non-AA >60 07/06/2021 10:33 AM    Calcium 9.1 07/06/2021 10:33 AM     Lab Results   Component Value Date/Time    Bilirubin, total 0.9 07/06/2021 10:33 AM    ALT (SGPT) 27 07/06/2021 10:33 AM    Alk. phosphatase 73 07/06/2021 10:33 AM    Protein, total 7.8 07/06/2021 10:33 AM    Albumin 4.3 07/06/2021 10:33 AM    Globulin 3.5 07/06/2021 10:33 AM     5/20/20 breast  MRI  Subareolar region of L breast 1.2 cm mass, at least 2 LN on left, 3 cm largest  Right breast negative    5/20/20 bone scan  Heterogeneous activity in the ribs of uncertain significance. This would be an unusual presentation of bony metastatic disease    5/20/20 CT c/a/p  negative    12/26/20 estradiol 8.6  FSH 95  LH 38.5    5/4/21 estradiol 441  FSH 8  LH 7.8    Records reviewed and summarized above.       Assessment/plan:   1.  L breast cancer, LN + by core, ER +, NJ +, HER 2 POSITIVE, cT1c cN1a cM0:  Stage IIA, prognostic stage IB  premenopausal    We explained to the patient that the goal of systemic adjuvant therapy is to improve the chances for cure and decrease the risk of relapse. We explained why a patient can have microscopic cancer spread now even though physical examination, laboratory studies and imaging studies are negative for cancer. We explained that the same treatments used now as adjuvant or preventive treatments rarely if ever are curative in women who develop metastases. TTE on 5/28/2020 EF 64%, TTE on 8/10/2020, EF 60%, TTE on 9/11/2020, EF 61%, overall stable, TTE on 12/2/2020, EF 60%, GLS stable. 3/8/21 TTE EF 62%, GLS normal; 6/2/21TTE stable without change. Repeat at completion of therapy. Bilateral mastectomies on 11/12/2020, no residual carcinoma in breast, 2/6 LNs (micromets). Discussed that with residual disease, will change from trastuzumab and pertuzumab to T-DM1 3.6 mg/kg IV q 3 weeks x 14 doses.  Side effects of T-DM1 include hepatotoxicity, pulmonary tox, thrombocytopenia, infusion reaction (rare), cardiotoxicity.  She is agreeable and has signed informed consent. T-DM1 #11/14 today    Discussed neratinib 240 mg daily with food for one year after the completion of herceptin. Discussed that there was only a 2% DFS benefit and that benefit was largely driven in the ER + population. Discussed that there are no data in its use post-T-DM1. Will discuss again after T-DM1.     Discussed common side effects of neratinib including but not limited to diarrhea, nausea, abdominal pain, fatigue, vomiting, rash, swollen and sore mouth (stomatitis), decreased appetite, muscle spasms, indigestion (dyspepsia), liver damage (AST or ALT enzyme increase), nail disorder, dry skin, abdominal swelling (distention), weight loss and urinary tract infection.     She is premenopausal, her cycles have returned    We discussed that for high risk women with breast cancer (having either received chemotherapy or < 28years old), ovarian suppression(such as monthly lupron 3.75 mg IM) + AI was superior in terms of overall survival than tamoxifen alone. The added risks of worse QOL due to depression and worse menopausal symptoms were discussed with the addition of ovarian suppression. The risks and benefits of tamoxifen were discussed in detail and the patient was informed of the following: Risks include a 1% risk of endometrial cancer for postmenopausal women treated for five years but no (or a minimally increased) risk in premenopausal women and that most women who develop tamoxifen-associated endometrial cancer can be cured. Any bleeding in a postmenopausal woman should be reported to a health care professional. There is also a 1% risk of blood clots (thromboembolism) that can be fatal. All patients irrespective of age who take tamoxifen and who have not had a hysterectomy should have a pelvic exam and Pap smear yearly. Tamoxifen increases the risk of cataract formation and on rare occasions has caused retinal damage: an eye exam is recommended yearly. Other risks include vaginal discharge or dryness, the development or worsening of hot flashes or vasomotor symptoms, and bone loss in premenopausal women. There is excellent evidence that tamoxifen does not increase risk of depression, cause weight gain or have a major effect on sexual function. Available data suggests little or no effect on cognitive function. Benefits include a lowering of cholesterol and a reduction in the rate of bone loss for postmenopausal woman. Any other symptoms should be reported. After this discussion, she is agreeable to lupron 3.75 mg q 4 weeks and anastrozole (started after her 2nd injection). We will plan to see the patient in follow up at least once per cycle, or sooner if symptoms warrant.  Labs with each cycle to monitor for toxicity    She brought up [id number], Vaccine to prevent recurrence in patients with HER-2 positive breast cancer. She appears eligible. Closest site is [facility]. She will consider. I do not see a downside other than travel for this. 2. Emotional well being:  She has excellent support and is coping well with her disease; on zoloft 75 mg daily    3. Genetic testing:  discussed potential ramifications of a positive test including the potential need for bilateral mastectomies and bilateral oophorectomies and the risk then for her family members to also have a mutation. VUS also discussed. Tested in Dr. Natalie Raphael office on 5/21/20, negative    4. Loss of fertility:  Discussed potential loss of menses and fertility. She is not interested in having further children. Declines oncofertility consult    5. Decline in LV strain and HTN: 7.8% decline since initial TTE on 5/28/2020, started on Coreg 3.125 mg BID. Repeat TTE on 9/10/2020, EF 61%, stable, 12/2/2020, stable 60%, strain stable. 3/8/21 TTE EF 62%, strain improved and normal.  Previously increased to coreg 12.5 mg bid. TTE 6/2/2021 with EF 62%, no interval change. 6. Thrombocytopenia:  Due to chemo. Will monitor. T-DM1 may cause thrombocytopenia    7. Joint pain:  Due to AI; improved    8. L side ROM issues:  Previously referred to PT. 9. HTN. Diagnosed during her 4th pregnancy. Previously on Metoprolol 50mg BID. She stopped this and was on Coreg for changes in EF on ECHO, will go back to Metoprolol now. 10. ast elevated:  Likely due to T-DM1, mild, monitoring    This patient was seen in conjunction with Beckie Delatorre NP. I personally reviewed the history and all points in the assessment and plan with the patient, and performed key points on the exam.       Signed By: Marizol Garcia MD      No orders of the defined types were placed in this encounter.

## 2021-07-07 ENCOUNTER — HOSPITAL ENCOUNTER (OUTPATIENT)
Dept: PHYSICAL THERAPY | Age: 41
Discharge: HOME | End: 2021-07-07
Attending: INTERNAL MEDICINE
Payer: COMMERCIAL

## 2021-07-07 PROCEDURE — 97140 MANUAL THERAPY 1/> REGIONS: CPT | Performed by: PHYSICAL THERAPIST

## 2021-07-07 PROCEDURE — 97110 THERAPEUTIC EXERCISES: CPT | Performed by: PHYSICAL THERAPIST

## 2021-07-07 NOTE — PROGRESS NOTES
PT DAILY ONCOLOGY TREATMENT NOTE 2-15    Patient Name: Diane Fuchs  Date:2021  :   [x]  Patient  Verified  Payor: BLUE CROSS / Plan: [facility] PPO / Product Type: PPO /    In time:11:05  Out time:12:05  Total Treatment Time (min):60   Total Timed Codes (min): 60  1:1 Treatment Time [facility] only):60    Visit #: 2     Treatment Area: pain in left breast    SUBJECTIVE  Pain Level (0-10 scale), subjective functional status/changes: Pt reports 0/10 pain at rest and 2-3/10 when \"cords are stressed with reaching\" . Pineda Winslow Pt states that she has decreased pain doing \"normal activities\" than when she first started PT  Any medication changes, allergies to medications, adverse drug reactions, diagnosis change, or new procedure performed?: [x] No    [] Yes (see summary sheet for update) SEE ABOVE. OBJECTIVE         30 min Therapeutic Exercise:  [x] See flow sheet : progressed per flow sheet:    Added foam roller pec stretching per flow sheet. Added UBE. Rationale: increase strength, improve coordination and increase proprioception to improve the atients ability to exercise, reach overhead without symptoms. 30 min Manual Therapy:  Manual PROM flexion, ABD with stabilization of scap. To facilitate stretch, Gentle median nerve glide for gliding of cording, STM to cording at axilla region and arm, L Pronator Teres pin and stretch with active supination and pronation, Myofascial rolling cords at medial elbow, MFR L axilla, pec. Rationale: decrease pain, increase tissue extensibility and decrease trigger points  to improve the patients ability to exercise, reach [facility].             With   [] TE   [] TA   [] neuro   [] other: Patient Education: [x] Review HEP    [x] Progressed/Changed HEP based on: subjective and objective findings  [] positioning   [] body mechanics   [] transfers   [] heat/ice application    [] other:        Pain Level (0-10 scale) post treatment: 0    ASSESSMENT/Changes in Function: Pt exhibited TTP at L pronator teres with tingling into hand, pronator teres may be compressing median nerve as well as cords diving underneath muscle. Added self MF stretch for pronator and progressed existing HEP to include fingers splayed and wrist extension or wave. Pt did well with changes. Patient will continue to benefit from skilled PT services to modify and progress therapeutic interventions, address functional mobility deficits, address ROM deficits, address strength deficits, analyze and address soft tissue restrictions, assess and modify postural abnormalities and instruct in home and community integration to attain remaining goals. Progress towards goals / Updated goals:  NT today.      PLAN      [x]  Continue plan of care    []  Other:_      Live Sharma PT  7/7/2021  11:04 AM

## 2021-07-13 DIAGNOSIS — C50.112 MALIGNANT NEOPLASM OF CENTRAL PORTION OF LEFT BREAST IN FEMALE, ESTROGEN RECEPTOR POSITIVE (HCC): ICD-10-CM

## 2021-07-13 DIAGNOSIS — C50.912 STAGE II BREAST CANCER, LEFT (HCC): Primary | ICD-10-CM

## 2021-07-13 DIAGNOSIS — I10 HYPERTENSION, UNSPECIFIED TYPE: ICD-10-CM

## 2021-07-13 DIAGNOSIS — Z17.0 MALIGNANT NEOPLASM OF CENTRAL PORTION OF LEFT BREAST IN FEMALE, ESTROGEN RECEPTOR POSITIVE (HCC): ICD-10-CM

## 2021-07-13 RX ORDER — HYDROCHLOROTHIAZIDE 12.5 MG/1
12.5 TABLET ORAL DAILY
Qty: 90 TABLET | Refills: 3 | Status: SHIPPED | OUTPATIENT
Start: 2021-07-13 | End: 2022-02-17 | Stop reason: ALTCHOICE

## 2021-07-13 NOTE — PROGRESS NOTES
7/13/21 6:40 PM: Received verbal order from Dr. Pradeep Cardozo for hydrochlorothiazide 12.5 mg daily. Prescription sent electronically to patient's preferred pharmacy and patient was notified via [address]

## 2021-07-21 ENCOUNTER — APPOINTMENT (OUTPATIENT)
Dept: PHYSICAL THERAPY | Age: 41
End: 2021-07-21
Attending: INTERNAL MEDICINE
Payer: COMMERCIAL

## 2021-07-27 ENCOUNTER — HOSPITAL ENCOUNTER (OUTPATIENT)
Dept: INFUSION THERAPY | Age: 41
Discharge: HOME | End: 2021-07-27
Payer: COMMERCIAL

## 2021-07-27 VITALS
RESPIRATION RATE: 16 BRPM | OXYGEN SATURATION: 96 % | TEMPERATURE: 96.5 F | SYSTOLIC BLOOD PRESSURE: 163 MMHG | DIASTOLIC BLOOD PRESSURE: 92 MMHG | WEIGHT: 127.4 LBS | HEIGHT: 62 IN | BODY MASS INDEX: 23.45 KG/M2 | HEART RATE: 56 BPM

## 2021-07-27 DIAGNOSIS — C50.912 STAGE II BREAST CANCER, LEFT (HCC): Primary | ICD-10-CM

## 2021-07-27 LAB
ALBUMIN SERPL-MCNC: 4.1 G/DL (ref 3.5–5)
ALBUMIN/GLOB SERPL: 1.1 {RATIO} (ref 1.1–2.2)
ALP SERPL-CCNC: 74 U/L (ref 45–117)
ALT SERPL-CCNC: 26 U/L (ref 12–78)
ANION GAP SERPL CALC-SCNC: 3 MMOL/L (ref 5–15)
AST SERPL-CCNC: 33 U/L (ref 15–37)
BASOPHILS # BLD: 0 K/UL (ref 0–0.1)
BASOPHILS NFR BLD: 1 % (ref 0–1)
BILIRUB SERPL-MCNC: 0.6 MG/DL (ref 0.2–1)
BUN SERPL-MCNC: 11 MG/DL (ref 6–20)
BUN/CREAT SERPL: 15 (ref 12–20)
CALCIUM SERPL-MCNC: 8.9 MG/DL (ref 8.5–10.1)
CHLORIDE SERPL-SCNC: 101 MMOL/L (ref 97–108)
CO2 SERPL-SCNC: 33 MMOL/L (ref 21–32)
CREAT SERPL-MCNC: 0.72 MG/DL (ref 0.55–1.02)
DIFFERENTIAL METHOD BLD: ABNORMAL
EOSINOPHIL # BLD: 0.2 K/UL (ref 0–0.4)
EOSINOPHIL NFR BLD: 4 % (ref 0–7)
ERYTHROCYTE [DISTWIDTH] IN BLOOD BY AUTOMATED COUNT: 12.5 % (ref 11.5–14.5)
GLOBULIN SER CALC-MCNC: 3.9 G/DL (ref 2–4)
GLUCOSE SERPL-MCNC: 112 MG/DL (ref 65–100)
HCT VFR BLD AUTO: 39.4 % (ref 35–47)
HGB BLD-MCNC: 13.5 G/DL (ref 11.5–16)
IMM GRANULOCYTES # BLD AUTO: 0 K/UL (ref 0–0.04)
IMM GRANULOCYTES NFR BLD AUTO: 0 % (ref 0–0.5)
LYMPHOCYTES # BLD: 1 K/UL (ref 0.8–3.5)
LYMPHOCYTES NFR BLD: 24 % (ref 12–49)
MCH RBC QN AUTO: 29.8 PG (ref 26–34)
MCHC RBC AUTO-ENTMCNC: 34.3 G/DL (ref 30–36.5)
MCV RBC AUTO: 87 FL (ref 80–99)
MONOCYTES # BLD: 0.4 K/UL (ref 0–1)
MONOCYTES NFR BLD: 11 % (ref 5–13)
NEUTS SEG # BLD: 2.4 K/UL (ref 1.8–8)
NEUTS SEG NFR BLD: 60 % (ref 32–75)
NRBC # BLD: 0 K/UL (ref 0–0.01)
NRBC BLD-RTO: 0 PER 100 WBC
PLATELET # BLD AUTO: 147 K/UL (ref 150–400)
PMV BLD AUTO: 10.2 FL (ref 8.9–12.9)
POTASSIUM SERPL-SCNC: 3.3 MMOL/L (ref 3.5–5.1)
PROT SERPL-MCNC: 8 G/DL (ref 6.4–8.2)
RBC # BLD AUTO: 4.53 M/UL (ref 3.8–5.2)
SODIUM SERPL-SCNC: 137 MMOL/L (ref 136–145)
WBC # BLD AUTO: 4 K/UL (ref 3.6–11)

## 2021-07-27 PROCEDURE — 96413 CHEMO IV INFUSION 1 HR: CPT

## 2021-07-27 PROCEDURE — 36415 COLL VENOUS BLD VENIPUNCTURE: CPT

## 2021-07-27 PROCEDURE — 74011250636 HC RX REV CODE- 250/636: Performed by: NURSE PRACTITIONER

## 2021-07-27 PROCEDURE — 96374 THER/PROPH/DIAG INJ IV PUSH: CPT

## 2021-07-27 PROCEDURE — 80053 COMPREHEN METABOLIC PANEL: CPT

## 2021-07-27 PROCEDURE — 74011250637 HC RX REV CODE- 250/637: Performed by: INTERNAL MEDICINE

## 2021-07-27 PROCEDURE — 85025 COMPLETE CBC W/AUTO DIFF WBC: CPT

## 2021-07-27 PROCEDURE — 77030012965 HC NDL HUBR BBMI -A

## 2021-07-27 RX ORDER — ONDANSETRON 2 MG/ML
8 INJECTION INTRAMUSCULAR; INTRAVENOUS ONCE
Status: COMPLETED | OUTPATIENT
Start: 2021-07-27 | End: 2021-07-27

## 2021-07-27 RX ORDER — SODIUM CHLORIDE 9 MG/ML
10 INJECTION INTRAMUSCULAR; INTRAVENOUS; SUBCUTANEOUS AS NEEDED
Status: ACTIVE | OUTPATIENT
Start: 2021-07-27 | End: 2021-07-27

## 2021-07-27 RX ORDER — SODIUM CHLORIDE 0.9 % (FLUSH) 0.9 %
10 SYRINGE (ML) INJECTION AS NEEDED
Status: DISPENSED | OUTPATIENT
Start: 2021-07-27 | End: 2021-07-27

## 2021-07-27 RX ORDER — SODIUM CHLORIDE 9 MG/ML
25 INJECTION, SOLUTION INTRAVENOUS CONTINUOUS
Status: DISPENSED | OUTPATIENT
Start: 2021-07-27 | End: 2021-07-27

## 2021-07-27 RX ORDER — HEPARIN 100 UNIT/ML
300-500 SYRINGE INTRAVENOUS AS NEEDED
Status: ACTIVE | OUTPATIENT
Start: 2021-07-27 | End: 2021-07-27

## 2021-07-27 RX ORDER — POTASSIUM CHLORIDE 1.5 G/1.77G
40 POWDER, FOR SOLUTION ORAL
Status: COMPLETED | OUTPATIENT
Start: 2021-07-27 | End: 2021-07-27

## 2021-07-27 RX ADMIN — Medication 500 UNITS: at 13:55

## 2021-07-27 RX ADMIN — SODIUM CHLORIDE 25 ML/HR: 9 INJECTION, SOLUTION INTRAVENOUS at 12:35

## 2021-07-27 RX ADMIN — SODIUM CHLORIDE 10 ML: 9 INJECTION INTRAMUSCULAR; INTRAVENOUS; SUBCUTANEOUS at 13:55

## 2021-07-27 RX ADMIN — POTASSIUM CHLORIDE 40 MEQ: 1.5 POWDER, FOR SOLUTION ORAL at 13:25

## 2021-07-27 RX ADMIN — ONDANSETRON 8 MG: 2 INJECTION INTRAMUSCULAR; INTRAVENOUS at 12:36

## 2021-07-27 RX ADMIN — ADO-TRASTUZUMAB EMTANSINE 200 MG: 20 INJECTION, POWDER, LYOPHILIZED, FOR SOLUTION INTRAVENOUS at 13:13

## 2021-07-27 NOTE — PROGRESS NOTES
[facility] Progress Note    Date: 2021    Name: Heidy Price    MRN: [id number]         : 1980    Ms. Amanda Bledsoe Arrived ambulatory and in no distress for C12D1 of Kadcyla Regimen. Assessment was completed, no acute issues at this time, no new complaints voiced. Right chest wall port accessed without difficulty, labs drawn & sent for processing. Chemotherapy Flowsheet 2021   Cycle C10D1   Date 2021   Drug / Regimen Kadcyla   Pre Meds given   Notes -        Patient proceed to appointment with Dr. Abhay Becerra. Ms. Mulligan's vitals were reviewed. Visit Vitals  BP (!) 163/92   Pulse (!) 56   Temp (!) 96.5 °F (35.8 °C)   Resp 16   Ht 5' 2\" (1.575 m)   Wt 57.8 kg (127 lb 6.4 oz)   SpO2 96%   Breastfeeding No   BMI 23.30 kg/m²       Lab results were obtained and reviewed. Recent Results (from the past 12 hour(s))   CBC WITH AUTOMATED DIFF    Collection Time: 21 10:29 AM   Result Value Ref Range    WBC 4.0 3.6 - 11.0 K/uL    RBC 4.53 3.80 - 5.20 M/uL    HGB 13.5 11.5 - 16.0 g/dL    HCT 39.4 35.0 - 47.0 %    MCV 87.0 80.0 - 99.0 FL    MCH 29.8 26.0 - 34.0 PG    MCHC 34.3 30.0 - 36.5 g/dL    RDW 12.5 11.5 - 14.5 %    PLATELET 823 (L) 023 - 400 K/uL    MPV 10.2 8.9 - 12.9 FL    NRBC 0.0 0  WBC    ABSOLUTE NRBC 0.00 0.00 - 0.01 K/uL    NEUTROPHILS 60 32 - 75 %    LYMPHOCYTES 24 12 - 49 %    MONOCYTES 11 5 - 13 %    EOSINOPHILS 4 0 - 7 %    BASOPHILS 1 0 - 1 %    IMMATURE GRANULOCYTES 0 0.0 - 0.5 %    ABS. NEUTROPHILS 2.4 1.8 - 8.0 K/UL    ABS. LYMPHOCYTES 1.0 0.8 - 3.5 K/UL    ABS. MONOCYTES 0.4 0.0 - 1.0 K/UL    ABS. EOSINOPHILS 0.2 0.0 - 0.4 K/UL    ABS. BASOPHILS 0.0 0.0 - 0.1 K/UL    ABS. IMM.  GRANS. 0.0 0.00 - 0.04 K/UL    DF AUTOMATED     METABOLIC PANEL, COMPREHENSIVE    Collection Time: 21 10:29 AM   Result Value Ref Range    Sodium 137 136 - 145 mmol/L    Potassium 3.3 (L) 3.5 - 5.1 mmol/L    Chloride 101 97 - 108 mmol/L    CO2 33 (H) 21 - 32 mmol/L    Anion gap 3 (L) 5 - 15 mmol/L Glucose 112 (H) 65 - 100 mg/dL    BUN 11 6 - 20 MG/DL    Creatinine 0.72 0.55 - 1.02 MG/DL    BUN/Creatinine ratio 15 12 - 20      GFR est AA >60 >60 ml/min/1.73m2    GFR est non-AA >60 >60 ml/min/1.73m2    Calcium 8.9 8.5 - 10.1 MG/DL    Bilirubin, total 0.6 0.2 - 1.0 MG/DL    ALT (SGPT) 26 12 - 78 U/L    AST (SGOT) 33 15 - 37 U/L    Alk. phosphatase 74 45 - 117 U/L    Protein, total 8.0 6.4 - 8.2 g/dL    Albumin 4.1 3.5 - 5.0 g/dL    Globulin 3.9 2.0 - 4.0 g/dL    A-G Ratio 1.1 1.1 - 2.2         [facility] will call patient to confirm appointment time for Lupron injection on Thursday 7/29/21. Medications:  Medications Administered     0.9% sodium chloride infusion     Admin Date  07/27/2021 Action  New Bag Dose  25 mL/hr Rate  25 mL/hr Route  IntraVENous Administered By  Josie Olivera RN          0.9% sodium chloride injection 10 mL     Admin Date  07/27/2021 Action  Given Dose  10 mL Route  IntraVENous Administered By  Josie Olivera RN          ADO-trastuzumab emtansine (KADCYLA) 200 mg in 0.9% sodium chloride 250 mL, overfill volume 25 mL Chemo infusion     Admin Date  07/27/2021 Action  New Bag Dose  200 mg Rate  570 mL/hr Route  IntraVENous Administered By  Josie Olivera RN          heparin (porcine) pf 300-500 Units     Admin Date  07/27/2021 Action  Given Dose  500 Units Route  InterCATHeter Administered By  Josie Olivera RN          ondansetron [facility]) injection 8 mg     Admin Date  07/27/2021 Action  Given Dose  8 mg Route  IntraVENous Administered By  Josie Olivera RN          potassium chloride (KLOR-CON) packet for solution 40 mEq     Admin Date  07/27/2021 Action  Given Dose  40 mEq Route  Oral Administered By  Josie Olivera RN                  Ms. Akilah Morales tolerated treatment well and was discharged from [place] [address] in stable condition. Port de-accessed, flushed & heparinized per protocol.  She is to return on August 17 at 10 for her next appointment.     Aditya Hays RN  July 27, 2021

## 2021-07-28 ENCOUNTER — PATIENT MESSAGE (OUTPATIENT)
Dept: ONCOLOGY | Age: 41
End: 2021-07-28

## 2021-07-28 ENCOUNTER — OFFICE VISIT (OUTPATIENT)
Dept: ONCOLOGY | Age: 41
End: 2021-07-28

## 2021-07-28 ENCOUNTER — HOSPITAL ENCOUNTER (OUTPATIENT)
Dept: PHYSICAL THERAPY | Age: 41
Discharge: HOME | End: 2021-07-28
Attending: INTERNAL MEDICINE
Payer: COMMERCIAL

## 2021-07-28 VITALS
SYSTOLIC BLOOD PRESSURE: 169 MMHG | OXYGEN SATURATION: 99 % | HEIGHT: 62 IN | HEART RATE: 64 BPM | BODY MASS INDEX: 23.77 KG/M2 | WEIGHT: 129.2 LBS | DIASTOLIC BLOOD PRESSURE: 101 MMHG | TEMPERATURE: 98 F | RESPIRATION RATE: 18 BRPM

## 2021-07-28 DIAGNOSIS — C50.912 STAGE II BREAST CANCER, LEFT (HCC): Primary | ICD-10-CM

## 2021-07-28 PROCEDURE — 97110 THERAPEUTIC EXERCISES: CPT | Performed by: PHYSICAL THERAPIST

## 2021-07-28 NOTE — PROGRESS NOTES
7/28/21 3:84 AM: Ranjana Dias is a 47 H. o. female here for office visit and Lupron injection.  Lupron injection in a sealed container from her local pharmacy. Lupron 3.75 mg given IM in right buttock without difficulty by Janell Wong RN.  Patient tolerated well.  Patient to schedule next injection for 28 days. Expiration: 2/10/2024  Lot number: [id number]  : Miteshgrady Giovanna       1. Have you been to the ER, urgent care clinic since your last visit? Hospitalized since your last visit?: No.    2. Have you seen or consulted any other health care providers outside of the [address] [place] [address] since your last visit? Include any pap smears or colon screening: No.    Patient's blood pressure is elevated today. She states that coming into this building makes her very anxious but she is not having any symptoms. Also states that she has been checking her blood pressure at home and the systolic readings have been between 854-911 and diastolic readings have been in the 70's. Patient has discussed this with her PCP and would like to switch to a different blood pressure medication if Dr. Lenin Rice is agreeable. Patient stated she has been experiencing hot flashes but would not like to be prescribed a medication for that at this time. Also stated she is interested in having her ovaries removed. RN will notify Dr. Lenin Rice.

## 2021-07-28 NOTE — PROGRESS NOTES
Physical Therapy at [facility],   a part of [address] [facility]  [address] [facility], [address] [place] [address]  Phone: [phone]  Fax: [phone]    Discharge Summary  2-15    Patient name: Denita Orr  : 1980  Provider#:[id number]  Referral source: Samantha Pierson MD      Medical/Treatment Diagnosis: pain in left breast L shoulder pain M25.512     Prior Hospitalization: see medical history     Comorbidities: L breast cancer  Prior Level of Function:No difficulties or pain with use of L UE  Medications: Verified on Patient Summary List    Start of Care: 21      Onset Date:2021   Visits from Start of Care: 5     Missed Visits: 1  Reporting Period : 21 to 2021     Goals Status:  1) Pt will be Independent with skin care routine to decrease risk of infection.  MET  2) Pt will know which signs and symptoms to report immediately to physician concerning their condition. MET  3) Pt will be have 0/10 pain when using L  UE for lifting, pushing, pulling, gripping, and all ADL's requiring use of extremity at or above shoulder level. MET     4) Pt will be Independent with HEP for pain management, utilizing correct breath pattern, strengthening, and motion exercises. MET  5) Pt will utilize correct breath and movement patterns during all ADL's and exercises involving reaching at or above shoulder level with 0/10 pain. MET  6) Pt will have 0/10 pain and at end range L shoulder elevation, ER, and Abduction in order to perform all ADL's and motor skills required of her job without discomfort. MET         ASSESSMENT/SUMMARY OF CARE: Pt has completed all rehab goals above.  D/C to HEP    RECOMMENDATIONS:  [x]Discontinue therapy: [x]Patient has reached or is progressing toward set goals      []Patient is non-compliant or has abdicated      []Due to lack of appreciable progress towards set [address], PT 2021

## 2021-07-28 NOTE — PROGRESS NOTES
PT DAILY ONCOLOGY TREATMENT NOTE 2-15    Patient Name: Karlene Richardson  Date:2021  :   [x]  Patient  Verified  Payor: BLUE LUISA / Plan: Ayleen Dudley [id number] PPO / Product Type: PPO /    In time:3:00  Out time: 3:30  Total Treatment Time (min):30   Total Timed Codes (min): 30  1:1 Treatment Time [facility] only):30    Visit #: 5     Treatment Area: pain in left breast    SUBJECTIVE  Pain Level (0-10 scale), subjective functional status/changes: Pt reports 0/10 pain at rest and   when \"cords are stressed with reaching\" . Maggi Miramontes Pt states that she felt and heard a pop about 10 days ago and that now \"I am fine but I will continue to do my stretches in preparation for my next surgery in September\"   Any medication changes, allergies to medications, adverse drug reactions, diagnosis change, or new procedure performed?: [x] No    [] Yes (see summary sheet for update)        OBJECTIVE         30 min Therapeutic Exercise:  [x] See flow sheet :             Rationale: increase strength, improve coordination and increase proprioception to improve the atients ability to exercise, reach overhead without symptoms. With   [] TE   [] TA   [] neuro   [] other: Patient Education: [x] Review HEP    [] Progressed/Changed HEP based on:    [] positioning   [] body mechanics   [] transfers   [] heat/ice application    [] other:        Other Objective Measurements:      ROM:                                                                     R                            L     Scapulohumoral Control / Rhythm:     yes                   Yes  Able to eccentrically lower with good control?  Right: Praveen Tobin       Leftt: Yes           Upper Extremity AROM:                                                                                     R                                 L  Shoulder Flexion                           175                               170                                                      Shoulder Abduction 165                               162  Shoulder Extension                       15                               15    Shoulder ER                                  35                               30    Shoulder IR                                   30                               30                                                                                                                                                               UPPER QUARTER                           MUSCLE STRENGTH  KEY                                                              R            L  0 - No Contraction        C1, C2 Neck Flex        4                                       1 - Trace                       C3 Side Flex          4           4                                                 2 - Poor                         C4 Sh Elev               4           4                                             3 - Fair                          C5 Deltoid/Biceps  4         4                                   4 - Good                       C6 Wrist Ext           4       4                                            5 - Normal                     C7 Triceps             4     4+                                                                                  J0 Thumb Ext        4         4                                                                                          O0 Hand Inst             4      4                                              MMT: See above                                            R                      L  Shoulder flexion                4                      4           Shoulder ER                      4                      4    Shoulder IR                       4                      4                               Palpation: Palpable cords at axilla; no remaining cords into medial arm  Posture: improved    Pain Level (0-10 scale) post treatment: 0    ASSESSMENT/Changes in Function:  See D/C summary         Progress towards goals / Updated goals: See D/C summary      PLAN      []  Continue plan of care    [x]  Other: D/C to Kameron Bales, MICHAEL  7/28/2021      3:21pm

## 2021-08-16 NOTE — PROGRESS NOTES
Alyx Aguilar is a 42 y.o. female here for follow up for left breast cancer. Treatment today:  Cycle 13 Day 1 ADO-Trastuzumab    1. Have you been to the ER, urgent care clinic since your last visit? Hospitalized since your last visit? no    2. Have you seen or consulted any other health care providers outside of the [address] [place] [address] since your last visit? Include any pap smears or colon screening. PCP    Pt states she has been doing well. No complaints.

## 2021-08-17 ENCOUNTER — HOSPITAL ENCOUNTER (OUTPATIENT)
Dept: INFUSION THERAPY | Age: 41
Discharge: HOME | End: 2021-08-17
Payer: COMMERCIAL

## 2021-08-17 ENCOUNTER — OFFICE VISIT (OUTPATIENT)
Dept: ONCOLOGY | Age: 41
End: 2021-08-17
Payer: COMMERCIAL

## 2021-08-17 VITALS
BODY MASS INDEX: 23.55 KG/M2 | TEMPERATURE: 98.1 F | HEART RATE: 58 BPM | SYSTOLIC BLOOD PRESSURE: 145 MMHG | DIASTOLIC BLOOD PRESSURE: 94 MMHG | WEIGHT: 128 LBS | RESPIRATION RATE: 16 BRPM | OXYGEN SATURATION: 99 % | HEIGHT: 62 IN

## 2021-08-17 VITALS
HEIGHT: 62 IN | HEART RATE: 62 BPM | BODY MASS INDEX: 23.65 KG/M2 | DIASTOLIC BLOOD PRESSURE: 84 MMHG | SYSTOLIC BLOOD PRESSURE: 149 MMHG | RESPIRATION RATE: 16 BRPM | TEMPERATURE: 98.1 F | WEIGHT: 128.5 LBS | OXYGEN SATURATION: 99 %

## 2021-08-17 DIAGNOSIS — C50.912 STAGE II BREAST CANCER, LEFT (HCC): Primary | ICD-10-CM

## 2021-08-17 DIAGNOSIS — C50.112 MALIGNANT NEOPLASM OF CENTRAL PORTION OF LEFT BREAST IN FEMALE, ESTROGEN RECEPTOR POSITIVE (HCC): ICD-10-CM

## 2021-08-17 DIAGNOSIS — Z17.0 MALIGNANT NEOPLASM OF CENTRAL PORTION OF LEFT BREAST IN FEMALE, ESTROGEN RECEPTOR POSITIVE (HCC): ICD-10-CM

## 2021-08-17 DIAGNOSIS — Z51.81 ENCOUNTER FOR MONITORING CARDIOTOXIC DRUG THERAPY: ICD-10-CM

## 2021-08-17 DIAGNOSIS — Z79.899 ENCOUNTER FOR MONITORING CARDIOTOXIC DRUG THERAPY: ICD-10-CM

## 2021-08-17 LAB
ALBUMIN SERPL-MCNC: 4.3 G/DL (ref 3.5–5)
ALBUMIN/GLOB SERPL: 1.1 {RATIO} (ref 1.1–2.2)
ALP SERPL-CCNC: 70 U/L (ref 45–117)
ALT SERPL-CCNC: 31 U/L (ref 12–78)
ANION GAP SERPL CALC-SCNC: 2 MMOL/L (ref 5–15)
AST SERPL-CCNC: 47 U/L (ref 15–37)
BASOPHILS # BLD: 0.1 K/UL (ref 0–0.1)
BASOPHILS NFR BLD: 1 % (ref 0–1)
BILIRUB SERPL-MCNC: 0.6 MG/DL (ref 0.2–1)
BUN SERPL-MCNC: 11 MG/DL (ref 6–20)
BUN/CREAT SERPL: 15 (ref 12–20)
CALCIUM SERPL-MCNC: 8.9 MG/DL (ref 8.5–10.1)
CHLORIDE SERPL-SCNC: 102 MMOL/L (ref 97–108)
CO2 SERPL-SCNC: 33 MMOL/L (ref 21–32)
CREAT SERPL-MCNC: 0.71 MG/DL (ref 0.55–1.02)
DIFFERENTIAL METHOD BLD: ABNORMAL
EOSINOPHIL # BLD: 0.2 K/UL (ref 0–0.4)
EOSINOPHIL NFR BLD: 4 % (ref 0–7)
ERYTHROCYTE [DISTWIDTH] IN BLOOD BY AUTOMATED COUNT: 12.5 % (ref 11.5–14.5)
GLOBULIN SER CALC-MCNC: 3.8 G/DL (ref 2–4)
GLUCOSE SERPL-MCNC: 98 MG/DL (ref 65–100)
HCT VFR BLD AUTO: 38.7 % (ref 35–47)
HGB BLD-MCNC: 13.4 G/DL (ref 11.5–16)
IMM GRANULOCYTES # BLD AUTO: 0 K/UL (ref 0–0.04)
IMM GRANULOCYTES NFR BLD AUTO: 0 % (ref 0–0.5)
LYMPHOCYTES # BLD: 1 K/UL (ref 0.8–3.5)
LYMPHOCYTES NFR BLD: 24 % (ref 12–49)
MCH RBC QN AUTO: 30.2 PG (ref 26–34)
MCHC RBC AUTO-ENTMCNC: 34.6 G/DL (ref 30–36.5)
MCV RBC AUTO: 87.4 FL (ref 80–99)
MONOCYTES # BLD: 0.4 K/UL (ref 0–1)
MONOCYTES NFR BLD: 9 % (ref 5–13)
NEUTS SEG # BLD: 2.6 K/UL (ref 1.8–8)
NEUTS SEG NFR BLD: 62 % (ref 32–75)
NRBC # BLD: 0 K/UL (ref 0–0.01)
NRBC BLD-RTO: 0 PER 100 WBC
PLATELET # BLD AUTO: 144 K/UL (ref 150–400)
PMV BLD AUTO: 10.2 FL (ref 8.9–12.9)
POTASSIUM SERPL-SCNC: 3.6 MMOL/L (ref 3.5–5.1)
PROT SERPL-MCNC: 8.1 G/DL (ref 6.4–8.2)
RBC # BLD AUTO: 4.43 M/UL (ref 3.8–5.2)
SODIUM SERPL-SCNC: 137 MMOL/L (ref 136–145)
WBC # BLD AUTO: 4.2 K/UL (ref 3.6–11)

## 2021-08-17 PROCEDURE — 36415 COLL VENOUS BLD VENIPUNCTURE: CPT

## 2021-08-17 PROCEDURE — 85025 COMPLETE CBC W/AUTO DIFF WBC: CPT

## 2021-08-17 PROCEDURE — 74011250636 HC RX REV CODE- 250/636: Performed by: NURSE PRACTITIONER

## 2021-08-17 PROCEDURE — 96375 TX/PRO/DX INJ NEW DRUG ADDON: CPT

## 2021-08-17 PROCEDURE — 96413 CHEMO IV INFUSION 1 HR: CPT

## 2021-08-17 PROCEDURE — 77030016057 HC NDL HUBR APOL -B

## 2021-08-17 PROCEDURE — 74011000258 HC RX REV CODE- 258: Performed by: NURSE PRACTITIONER

## 2021-08-17 PROCEDURE — 99215 OFFICE O/P EST HI 40 MIN: CPT | Performed by: INTERNAL MEDICINE

## 2021-08-17 PROCEDURE — 80053 COMPREHEN METABOLIC PANEL: CPT

## 2021-08-17 RX ORDER — SODIUM CHLORIDE 0.9 % (FLUSH) 0.9 %
10 SYRINGE (ML) INJECTION AS NEEDED
Status: DISCONTINUED | OUTPATIENT
Start: 2021-08-17 | End: 2021-08-18 | Stop reason: HOSPADM

## 2021-08-17 RX ORDER — SODIUM CHLORIDE 9 MG/ML
10 INJECTION INTRAMUSCULAR; INTRAVENOUS; SUBCUTANEOUS AS NEEDED
Status: DISCONTINUED | OUTPATIENT
Start: 2021-08-17 | End: 2021-08-18 | Stop reason: HOSPADM

## 2021-08-17 RX ORDER — ONDANSETRON 2 MG/ML
8 INJECTION INTRAMUSCULAR; INTRAVENOUS ONCE
Status: COMPLETED | OUTPATIENT
Start: 2021-08-17 | End: 2021-08-17

## 2021-08-17 RX ORDER — SODIUM CHLORIDE 9 MG/ML
25 INJECTION, SOLUTION INTRAVENOUS CONTINUOUS
Status: DISCONTINUED | OUTPATIENT
Start: 2021-08-17 | End: 2021-08-18 | Stop reason: HOSPADM

## 2021-08-17 RX ORDER — OLMESARTAN MEDOXOMIL AND HYDROCHLOROTHIAZIDE 20/12.5 20; 12.5 MG/1; MG/1
1 TABLET ORAL DAILY
COMMUNITY
End: 2022-10-25

## 2021-08-17 RX ORDER — HEPARIN 100 UNIT/ML
300-500 SYRINGE INTRAVENOUS AS NEEDED
Status: DISCONTINUED | OUTPATIENT
Start: 2021-08-17 | End: 2021-08-18 | Stop reason: HOSPADM

## 2021-08-17 RX ADMIN — ADO-TRASTUZUMAB EMTANSINE 200 MG: 20 INJECTION, POWDER, LYOPHILIZED, FOR SOLUTION INTRAVENOUS at 14:18

## 2021-08-17 RX ADMIN — SODIUM CHLORIDE 25 ML/HR: 900 INJECTION, SOLUTION INTRAVENOUS at 13:01

## 2021-08-17 RX ADMIN — Medication 500 UNITS: at 14:52

## 2021-08-17 RX ADMIN — Medication 10 ML: at 14:51

## 2021-08-17 RX ADMIN — ONDANSETRON 8 MG: 2 INJECTION INTRAMUSCULAR; INTRAVENOUS at 13:01

## 2021-08-17 NOTE — PROGRESS NOTES
[facility] Progress Note    Date: 2021    Name: Jessica King    MRN: [id number]         : 1980    Ms. Colby Tirado Arrived ambulatory and in no distress for C13D1 of  Kadcyla Regimen. Assessment was completed, no acute issues at this time, no new complaints voiced. Right chest wall port accessed without difficulty, labs drawn & sent for processing. Chemotherapy Flowsheet 2021   Cycle C13D1   Date 2021   Drug / Regimen Kadcyla   Pre Meds given   Notes -        Patient proceed to appointment with Dr. Reina Mills. No change in treatment plan. Ms. Mulligan's vitals were reviewed. Visit Vitals  BP (!) 149/84   Pulse 62   Temp 98.1 °F (36.7 °C)   Resp 16   Ht 5' 2\" (1.575 m)   Wt 58.3 kg (128 lb 8 oz)   SpO2 99%   BMI 23.50 kg/m²       Lab results were obtained and reviewed, within parameters for treatment. Recent Results (from the past 12 hour(s))   CBC WITH AUTOMATED DIFF    Collection Time: 21 10:37 AM   Result Value Ref Range    WBC 4.2 3.6 - 11.0 K/uL    RBC 4.43 3.80 - 5.20 M/uL    HGB 13.4 11.5 - 16.0 g/dL    HCT 38.7 35.0 - 47.0 %    MCV 87.4 80.0 - 99.0 FL    MCH 30.2 26.0 - 34.0 PG    MCHC 34.6 30.0 - 36.5 g/dL    RDW 12.5 11.5 - 14.5 %    PLATELET 014 (L) 582 - 400 K/uL    MPV 10.2 8.9 - 12.9 FL    NRBC 0.0 0  WBC    ABSOLUTE NRBC 0.00 0.00 - 0.01 K/uL    NEUTROPHILS 62 32 - 75 %    LYMPHOCYTES 24 12 - 49 %    MONOCYTES 9 5 - 13 %    EOSINOPHILS 4 0 - 7 %    BASOPHILS 1 0 - 1 %    IMMATURE GRANULOCYTES 0 0.0 - 0.5 %    ABS. NEUTROPHILS 2.6 1.8 - 8.0 K/UL    ABS. LYMPHOCYTES 1.0 0.8 - 3.5 K/UL    ABS. MONOCYTES 0.4 0.0 - 1.0 K/UL    ABS. EOSINOPHILS 0.2 0.0 - 0.4 K/UL    ABS. BASOPHILS 0.1 0.0 - 0.1 K/UL    ABS. IMM.  GRANS. 0.0 0.00 - 0.04 K/UL    DF AUTOMATED     METABOLIC PANEL, COMPREHENSIVE    Collection Time: 21 10:37 AM   Result Value Ref Range    Sodium 137 136 - 145 mmol/L    Potassium 3.6 3.5 - 5.1 mmol/L    Chloride 102 97 - 108 mmol/L    CO2 33 (H) 21 - 32 mmol/L    Anion gap 2 (L) 5 - 15 mmol/L    Glucose 98 65 - 100 mg/dL    BUN 11 6 - 20 MG/DL    Creatinine 0.71 0.55 - 1.02 MG/DL    BUN/Creatinine ratio 15 12 - 20      GFR est AA >60 >60 ml/min/1.73m2    GFR est non-AA >60 >60 ml/min/1.73m2    Calcium 8.9 8.5 - 10.1 MG/DL    Bilirubin, total 0.6 0.2 - 1.0 MG/DL    ALT (SGPT) 31 12 - 78 U/L    AST (SGOT) 47 (H) 15 - 37 U/L    Alk. phosphatase 70 45 - 117 U/L    Protein, total 8.1 6.4 - 8.2 g/dL    Albumin 4.3 3.5 - 5.0 g/dL    Globulin 3.8 2.0 - 4.0 g/dL    A-G Ratio 1.1 1.1 - 2.2         Medications:  Medications Administered     0.9% sodium chloride infusion     Admin Date  08/17/2021 Action  New Bag Dose  25 mL/hr Rate  25 mL/hr Route  IntraVENous Administered By  [facility] [employer], [place]          ADO-trastuzumab emtansine (KADCYLA) 200 mg in 0.9% sodium chloride 250 mL, overfill volume 25 mL Chemo infusion     Admin Date  08/17/2021 Action  New Bag Dose  200 mg Rate  570 mL/hr Route  IntraVENous Administered By  [facility] [employer], [place]          heparin (porcine) pf 300-500 Units     Admin Date  08/17/2021 Action  Given Dose  500 Units Route  InterCATHeter Administered By  [facility] [employer], [place]          [facility]etron [facility] injection 8 mg     Admin Date  08/17/2021 Action  Given Dose  8 mg Route  IntraVENous Administered By  [facility] [employer], [place]          sodium chloride (NS) flush 10 mL     Admin Date  08/17/2021 Action  Given Dose  10 mL Route  IntraVENous Administered By  [facility] [employer], [place]                Ms. Cyril Rosario tolerated treatment well and was discharged from [place] [address] in stable condition at 1500. Port de-accessed, flushed & heparinized per protocol. She is to return on September 7 at 1500 for her next appointment.     [facility]  August 17, 2021

## 2021-08-17 NOTE — PROGRESS NOTES
[facility] at [address], [address] [facility]  Moriah Moralesch: [phone]  F: [phone]    Reason for Visit:   Juliana Wade is a 42 y.o. female who is seen for follow up of breast cancer    Breast surgeon:  Dr. Molly Arteaga surg:  Dr. Razia Portillo onc:  Dr. Remedios Vang    Treatment History:   · 20 left breast ax core bx: Adenocarcinoma, ER + at 86% ; SD + at 69%; HER 2 POSITIVE (IHC 2+, FISH ratio 3.2; sig/cell 6.08), ki67 29%  · 20 L breast core bx: Atypical 1 mm focus, highly suspicious for [facility], lobular features, the tumor does not histologically match the patient's prior axillary cancer, but could represent a small sample of the same tumor  · 20 L breast excisional bx: Subareolar lumpectomy, IDC, 1.6 cm, invasive tumor present at anterior and lateral margins, gr 2, + LVI, 1/1 LN involved, 1.1 cm, ER + at 48%, SD + at 72%, HER 2 POSITIVE (IHC 2+, FISH ratio 2, sig/cell 4.14)  · 20 [facility] genetic testing:  negative  · TCHP 2020-2020  · 2020 Bilateral mastectomy: Right breast: benign. Left breast: Inflammation, no residual carcinoma, 2/6 LNs (micromets in both, 1.7 mm, and 1.1 mm). pT1c ypN1mi cM0  · T-DM1 20-21  · XRT 20-21   · Anastrozole 2021-21 (menses returned); 21-  · lupron 21-    History of Present Illness:   She noticed a L axilla mass in 2020, leading to the pathology above. Interval history:  Reports gr 2 hot flashes, gr 1 insomnia, gr 1 anxiety, gr 1 neuropathy, gr 1 headache    S/p covid 19 vaccines    FH:   No breast, ovarian, prostate, or pancreas cancer    LMP 21    Past Medical History:   Diagnosis Date    Anxiety     Cancer [facility])     Essential hypertension     HX OTHER MEDICAL ,         Infertility     IVF with first pregnancy    Infertility, female     Joint pain     Psychiatric problem       Past Surgical History:   Procedure Laterality Date    HX BILATERAL MASTECTOMY  2020    HX OTHER SURGICAL      Horton Teeth Removal    HX OTHER SURGICAL      1/2017 Excision of mole on toe with skin graph      Social History     Tobacco Use    Smoking status: Never Smoker    Smokeless tobacco: Never Used   Substance Use Topics    Alcohol use: Yes      Family History   Problem Relation Age of Onset    Diabetes Mother     Heart Disease Mother     Hypertension Mother     Hypertension Father     Cancer Paternal Grandmother         Lung cancer    Stroke Paternal Grandmother     Cancer Paternal Grandfather         Melanoma     Current Outpatient Medications   Medication Sig    olmesartan-hydroCHLOROthiazide (BENICAR HCT) 20-12.5 mg per tablet Take 1 Tablet by mouth daily.  metoprolol tartrate (LOPRESSOR) 50 mg tablet Take 1 Tablet by mouth two (2) times a day.  leuprolide depot (LUPRON) 3.75 mg injection 1 Each by IntraMUSCular route every four (4) weeks.  loratadine (Claritin) 10 mg tablet Take 10 mg by mouth.  fluticasone propionate (FLONASE ALLERGY RELIEF NA) by Nasal route.  anastrozole (ARIMIDEX) 1 mg tablet Take 1 mg by mouth daily.  acetaminophen (TYLENOL PO) Take  by mouth.  lidocaine-prilocaine (EMLA) topical cream Apply  to affected area as needed for Pain.  ondansetron (ZOFRAN ODT) 8 mg disintegrating tablet Take 1 Tab by mouth every eight (8) hours as needed for Nausea or Vomiting. For 2 days following chemo    ibuprofen (MOTRIN) 600 mg tablet Take 1 Tab by mouth every six (6) hours as needed for Pain.  sertraline (ZOLOFT) 25 mg tablet Take 25 mg by mouth daily.  PNV No.22-Iron Cbn&Gluc-FA-DOS 27-1-50 mg Tab Take  by mouth. Indications: PREGNANCY    hydroCHLOROthiazide (HYDRODIURIL) 12.5 mg tablet Take 1 Tablet by mouth daily. (Patient not taking: Reported on 8/17/2021)     No current facility-administered medications for this visit.       No Known Allergies     Review of Systems: A complete review of systems was obtained, negative except as described above. Physical Exam:     Visit Vitals  BP (!) 145/94   Pulse (!) 58   Temp 98.1 °F (36.7 °C)   Resp 16   Ht 5' 2\" (1.575 m)   Wt 128 lb (58.1 kg)   SpO2 99%   BMI 23.41 kg/m²     ECOG PS: 0  General: no distress  Eyes: anicteric sclerae  HENT: oropharynx clear  Neck: supple  Lymphatic: no cervical, supraclavicular adenopathy  Respiratory: normal respiratory effort  CV: no peripheral edema  GI: nondistended  Skin: no rashes; no ecchymoses; no petechiae    Results:     Lab Results   Component Value Date/Time    WBC 4.2 08/17/2021 10:37 AM    HGB 13.4 08/17/2021 10:37 AM    HCT 38.7 08/17/2021 10:37 AM    PLATELET 156 (L) 82/25/1211 10:37 AM    MCV 87.4 08/17/2021 10:37 AM    ABS. NEUTROPHILS 2.6 08/17/2021 10:37 AM     Lab Results   Component Value Date/Time    Sodium 137 08/17/2021 10:37 AM    Potassium 3.6 08/17/2021 10:37 AM    Chloride 102 08/17/2021 10:37 AM    CO2 33 (H) 08/17/2021 10:37 AM    Glucose 98 08/17/2021 10:37 AM    BUN 11 08/17/2021 10:37 AM    Creatinine 0.71 08/17/2021 10:37 AM    GFR est AA >60 08/17/2021 10:37 AM    GFR est non-AA >60 08/17/2021 10:37 AM    Calcium 8.9 08/17/2021 10:37 AM     Lab Results   Component Value Date/Time    Bilirubin, total 0.6 08/17/2021 10:37 AM    ALT (SGPT) 31 08/17/2021 10:37 AM    Alk. phosphatase 70 08/17/2021 10:37 AM    Protein, total 8.1 08/17/2021 10:37 AM    Albumin 4.3 08/17/2021 10:37 AM    Globulin 3.8 08/17/2021 10:37 AM     5/20/20 breast  MRI  Subareolar region of L breast 1.2 cm mass, at least 2 LN on left, 3 cm largest  Right breast negative    5/20/20 bone scan  Heterogeneous activity in the ribs of uncertain significance. This would be an unusual presentation of bony metastatic disease    5/20/20 CT c/a/p  negative    12/26/20 estradiol 8.6  FSH 95  LH 38.5    5/4/21 estradiol 441  FSH 8  LH 7.8    Records reviewed and summarized above.       Assessment/plan:   1.  L breast cancer, LN + by core, ER +, RI +, HER 2 POSITIVE, cT1c cN1a cM0:  Stage IIA, prognostic stage IB  premenopausal    We explained to the patient that the goal of systemic adjuvant therapy is to improve the chances for cure and decrease the risk of relapse. We explained why a patient can have microscopic cancer spread now even though physical examination, laboratory studies and imaging studies are negative for cancer. We explained that the same treatments used now as adjuvant or preventive treatments rarely if ever are curative in women who develop metastases. TTE on 5/28/2020 EF 64%, TTE on 8/10/2020, EF 60%, TTE on 9/11/2020, EF 61%, overall stable, TTE on 12/2/2020, EF 60%, GLS stable. 3/8/21 TTE EF 62%, GLS normal; 6/2/21 TTE stable without change. Repeat ordered    Bilateral mastectomies on 11/12/2020, no residual carcinoma in breast, 2/6 LNs (micromets). Discussed that with residual disease, will change from trastuzumab and pertuzumab to T-DM1 3.6 mg/kg IV q 3 weeks x 14 doses.  Side effects of T-DM1 include hepatotoxicity, pulmonary tox, thrombocytopenia, infusion reaction (rare), cardiotoxicity.  She is agreeable and has signed informed consent. T-DM1 #13/14 today    Discussed neratinib 240 mg daily with food for one year after the completion of herceptin. Discussed that there was only a 2% DFS benefit and that benefit was largely driven in the ER + population. Discussed that there are no data in its use post-T-DM1. Will discuss again after T-DM1.     Discussed common side effects of neratinib including but not limited to diarrhea, nausea, abdominal pain, fatigue, vomiting, rash, swollen and sore mouth (stomatitis), decreased appetite, muscle spasms, indigestion (dyspepsia), liver damage (AST or ALT enzyme increase), nail disorder, dry skin, abdominal swelling (distention), weight loss and urinary tract infection.     She is premenopausal, her cycles have returned    We discussed that for high risk women with breast cancer (having either received chemotherapy or < 28years old), ovarian suppression(such as monthly lupron 3.75 mg IM) + AI was superior in terms of overall survival than tamoxifen alone. The added risks of worse QOL due to depression and worse menopausal symptoms were discussed with the addition of ovarian suppression. The risks and benefits of tamoxifen were discussed in detail and the patient was informed of the following: Risks include a 1% risk of endometrial cancer for postmenopausal women treated for five years but no (or a minimally increased) risk in premenopausal women and that most women who develop tamoxifen-associated endometrial cancer can be cured. Any bleeding in a postmenopausal woman should be reported to a health care professional. There is also a 1% risk of blood clots (thromboembolism) that can be fatal. All patients irrespective of age who take tamoxifen and who have not had a hysterectomy should have a pelvic exam and Pap smear yearly. Tamoxifen increases the risk of cataract formation and on rare occasions has caused retinal damage: an eye exam is recommended yearly. Other risks include vaginal discharge or dryness, the development or worsening of hot flashes or vasomotor symptoms, and bone loss in premenopausal women. There is excellent evidence that tamoxifen does not increase risk of depression, cause weight gain or have a major effect on sexual function. Available data suggests little or no effect on cognitive function. Benefits include a lowering of cholesterol and a reduction in the rate of bone loss for postmenopausal woman. Any other symptoms should be reported. After this discussion, she is agreeable to lupron 3.75 mg q 4 weeks and anastrozole (started after her 2nd injection). She is interested in discussing bilateral oophorectomy, referred to gyn onc    We will plan to see the patient in follow up at least once per cycle, or sooner if symptoms warrant.  Labs with each cycle to monitor for toxicity    She brought up [id number], Vaccine to prevent recurrence in patients with HER-2 positive breast cancer. She appears eligible. Closest site is [employer]) or [employer] . She will consider. I do not see a downside other than travel for this. 2. Emotional well being:  She has excellent support and is coping well with her disease; on zoloft 75 mg daily    3. Genetic testing:  discussed potential ramifications of a positive test including the potential need for bilateral mastectomies and bilateral oophorectomies and the risk then for her family members to also have a mutation. VUS also discussed. Tested in Dr. Joe Bishop office on 5/21/20, negative    4. Loss of fertility:  Discussed potential loss of menses and fertility. She is not interested in having further children. Declines oncofertility consult    5. Decline in LV strain and HTN: 7.8% decline since initial TTE on 5/28/2020, started on Coreg 3.125 mg BID. Repeat TTE on 9/10/2020, EF 61%, stable, 12/2/2020, stable 60%, strain stable. 3/8/21 TTE EF 62%, strain improved and normal.  Previously increased to coreg 12.5 mg bid. TTE 6/2/2021 with EF 62%, no interval change. 6. Thrombocytopenia:  Due to chemo. Will monitor. T-DM1 may cause thrombocytopenia    7. Joint pain:  Due to AI; improved    8. L side ROM issues:  Previously referred to PT. 9. HTN. Diagnosed during her 4th pregnancy. Metoprolol 50mg BID. Also on benicar 12.5-25 mg daily; improved at home    10. ast elevated:  Likely due to T-DM1, mild, monitoring      Signed By: Nilo Hill MD      No orders of the defined types were placed in this encounter.

## 2021-08-23 ENCOUNTER — TELEPHONE (OUTPATIENT)
Dept: ONCOLOGY | Age: 41
End: 2021-08-23

## 2021-08-23 NOTE — TELEPHONE ENCOUNTER
LVM that her Lupron shot had been deliver to the office for her appt on 08/26/2020.    Message to call the office with any questions

## 2021-08-25 ENCOUNTER — OFFICE VISIT (OUTPATIENT)
Dept: GYNECOLOGY | Age: 41
End: 2021-08-25
Payer: COMMERCIAL

## 2021-08-25 VITALS
DIASTOLIC BLOOD PRESSURE: 93 MMHG | HEIGHT: 62 IN | SYSTOLIC BLOOD PRESSURE: 150 MMHG | HEART RATE: 55 BPM | BODY MASS INDEX: 23.37 KG/M2 | WEIGHT: 127 LBS

## 2021-08-25 DIAGNOSIS — C50.912 STAGE II BREAST CANCER, LEFT (HCC): Primary | ICD-10-CM

## 2021-08-25 PROCEDURE — 99204 OFFICE O/P NEW MOD 45 MIN: CPT | Performed by: OBSTETRICS & GYNECOLOGY

## 2021-08-25 NOTE — LETTER
8/31/2021    Patient: Ayana Weller   YOB: 1980   Date of Visit: 8/25/2021     Sandra Rater, [address] [place] [address] [phone]  Via Fax: [phone] Toñito Gracia MD  [address] [place] [address]  Via In [facility] [address]    Dear Sandra Rater, MD Darryl Martinez MD,      Thank you for referring Ms. Farhat Gray to [facility] for evaluation. My notes for this consultation are attached. If you have questions, please do not hesitate to call me. I look forward to following your patient along with you.       Sincerely,    Litzy Coyne MD

## 2021-08-25 NOTE — PROGRESS NOTES
[address] [place] [address] Mathias Moritz [address], [address] [facility]  P [phone]  F [phone]    Office Note  Patient ID:  Name:  Elizabeth Barahona  MRN:  [id number]  :  1980/41 y.o. Date:  2021      HISTORY OF PRESENT ILLNESS:  Ms. Elizabeth Barahona is a 42 y.o.  premenopausal female who presents as a new patient from Dr. Elvin Hollingsworth for ER+ breast cancer desiring to discuss the role of therapeutic BSO. The patient's treatment history of her breast cancer is obtained from Dr. Latricia Puente note on 2021:  · 20 left breast ax core bx: Adenocarcinoma, ER + at 86% ; MA + at 69%; HER 2 POSITIVE (IHC 2+, FISH ratio 3.2; sig/cell 6.08), ki67 29%  · 20 L breast core bx: Atypical 1 mm focus, highly suspicious for [facility], lobular features, the tumor does not histologically match the patient's prior axillary cancer, but could represent a small sample of the same tumor  · 20 L breast excisional bx: Subareolar lumpectomy, IDC, 1.6 cm, invasive tumor present at anterior and lateral margins, gr 2, + LVI, 1/1 LN involved, 1.1 cm, ER + at 48%, MA + at 72%, HER 2 POSITIVE (IHC 2+, FISH ratio 2, sig/cell 4.14)  · 20 Chuckbella Gupta genetic testing:  negative  · TCHP 2020-2020  · 2020 Bilateral mastectomy: Right breast: benign. Left breast: Inflammation, no residual carcinoma, 2/6 LNs (micromets in both, 1.7 mm, and 1.1 mm). pT1c ypN1mi cM0  · T-DM1 20-21  · XRT 20-21   · Anastrozole 2021-21 (menses returned); 21-  · lupron 21-    The patient presents today to discuss the role of a BSO so that she may not have to continue with Lupron. As noted above, her tumor is ER+, MA+, HER2+. Genetic testing is negative per Dr. Latricia Puente note. The patient reports her menses returned at the end of her treatment, but now that she is on Lupron and Anastrozole, they have stopped again. Active, no restrictions.      ROS:  A comprehensive review of systems was negative except for that written in the History of Present Illness. , 10 point ROS    OB/GYN ROS:  Patient denies significant menstrual problems. ECOG ndGndrndanddndend:nd nd2nd Problem List:  Patient Active Problem List    Diagnosis Date Noted    Stage II breast cancer, left ([facility] [address] 2020    Anxiety 2016    HTN (hypertension) 2016    Gestational thrombocytopenia ([facility] [address] 2016    Delivery normal 06/15/2012     PMH:  Past Medical History:   Diagnosis Date    Anxiety     Cancer [facility])     Essential hypertension     HX OTHER MEDICAL ,         Infertility     IVF with first pregnancy    Infertility, female     Joint pain     Psychiatric problem       PSH:  Past Surgical History:   Procedure Laterality Date    HX BILATERAL MASTECTOMY      HX OTHER SURGICAL      Bradley Teeth Removal    HX OTHER SURGICAL      2017 Excision of mole on toe with skin graph      Social History:  Social History     Tobacco Use    Smoking status: Never Smoker    Smokeless tobacco: Never Used   Substance Use Topics    Alcohol use: Yes      Family History:  Family History   Problem Relation Age of Onset    Diabetes Mother     Heart Disease Mother     Hypertension Mother     Hypertension Father     Cancer Paternal Grandmother         Lung cancer    Stroke Paternal Grandmother     Cancer Paternal Grandfather         Melanoma      Medications: (reviewed)  Current Outpatient Medications   Medication Sig    olmesartan-hydroCHLOROthiazide (BENICAR HCT) 20-12.5 mg per tablet Take 1 Tablet by mouth daily.  metoprolol tartrate (LOPRESSOR) 50 mg tablet Take 1 Tablet by mouth two (2) times a day.  leuprolide depot (LUPRON) 3.75 mg injection 1 Each by IntraMUSCular route every four (4) weeks.  fluticasone propionate (FLONASE ALLERGY RELIEF NA) by Nasal route.  anastrozole (ARIMIDEX) 1 mg tablet Take 1 mg by mouth daily.  acetaminophen (TYLENOL PO) Take  by mouth.     lidocaine-prilocaine (EMLA) topical cream Apply  to affected area as needed for Pain.  ondansetron (ZOFRAN ODT) 8 mg disintegrating tablet Take 1 Tab by mouth every eight (8) hours as needed for Nausea or Vomiting. For 2 days following chemo    sertraline (ZOLOFT) 25 mg tablet Take 25 mg by mouth daily.  hydroCHLOROthiazide (HYDRODIURIL) 12.5 mg tablet Take 1 Tablet by mouth daily.  loratadine (Claritin) 10 mg tablet Take 10 mg by mouth.  ibuprofen (MOTRIN) 600 mg tablet Take 1 Tab by mouth every six (6) hours as needed for Pain. No current facility-administered medications for this visit. Allergies: (reviewed)  No Known Allergies     Gyn History:   Last pap: 2-3 years ago, normal  History of abnormal pap: denies      OBJECTIVE:    Physical Exam:  VITAL SIGNS: Vitals:    08/25/21 1117   BP: (!) 150/93   Pulse: (!) 55   Weight: 127 lb (57.6 kg)   Height: 5' 2\" (1.575 m)     Body mass index is 23.23 kg/m². GENERAL JOSE: Conversant, alert, oriented. No acute distress. HEENT: HEENT. No thyroid enlargement. No JVD. Neck: Supple without restrictions. RESPIRATORY: Clear to auscultation and percussion to the bases. No CVAT. CARDIOVASC: RRR without murmur/rub. GASTROINT: soft, non-tender, without masses or organomegaly   MUSCULOSKEL: no joint tenderness, deformity or swelling       EXTREMITIES: extremities normal, atraumatic, no cyanosis or edema   PELVIC: Exam chaperoned by nurse. Normal appearing external genitalia. On speculum exam, normal appearing vagina and cervix. On bimanual exam, the cervix and uterus are normal size and mobile. No evidence of adnexal masses or nodularity. RECTAL: deferred   HAMZAH SURVEY: No suspicious lymphadenopathy or edema noted. NEURO: Grossly intact. No acute deficit.        Lab Date as available:    Lab Results   Component Value Date/Time    WBC 4.2 08/17/2021 10:37 AM    HGB 13.4 08/17/2021 10:37 AM    HCT 38.7 08/17/2021 10:37 AM    PLATELET 877 (L) 08/17/2021 10:37 AM    MCV 87.4 08/17/2021 10:37 AM     Lab Results   Component Value Date/Time    Sodium 137 08/17/2021 10:37 AM    Potassium 3.6 08/17/2021 10:37 AM    Chloride 102 08/17/2021 10:37 AM    CO2 33 (H) 08/17/2021 10:37 AM    Anion gap 2 (L) 08/17/2021 10:37 AM    Glucose 98 08/17/2021 10:37 AM    BUN 11 08/17/2021 10:37 AM    Creatinine 0.71 08/17/2021 10:37 AM    BUN/Creatinine ratio 15 08/17/2021 10:37 AM    GFR est AA >60 08/17/2021 10:37 AM    GFR est non-AA >60 08/17/2021 10:37 AM    Calcium 8.9 08/17/2021 10:37 AM         IMPRESSION/PLAN:    Ms. Karlene Richardson is a 42 y.o. female with a working diagnosis of ER+/AL+/HER2+ breast cancer, currently on Lupron/Anastrozole, presenting for discussion of therapeutic BSO. Genetic testing negative. See treatment history above. Problems:     Patient Active Problem List    Diagnosis Date Noted    Stage II breast cancer, left ([facility] [address] 05/22/2020    Anxiety 05/19/2016    HTN (hypertension) 05/19/2016    Gestational thrombocytopenia ([facility] [address] 05/18/2016    Delivery normal 06/15/2012       I reviewed Ms. Radha Mulligan's course to date, including her medical records, recent studies, physical exam, and review of symptoms. Counseled patient regarding the role of therapeutic BSO in ER+ breast cancer. Currently her estrogen production is suppressed with Lupron/Anastrozole. With a BSO she would be able to forego any further Lupron. Discussed the role of a hysterectomy at the same time, which would be reasonable to consider. Having said that, she does not have any risk factors for developing cervical or endometrial cancer. The patient and I have agreed that we will proceed with laparoscopic BSO. Will post at the patient's convenience in the coming months. She is going to try and strategically schedule this around her job and the holidays. As noted above, plan will be to preserve the uterus unless she changes her mind.  Reviewed risks, benefits, indications, and alternatives of surgery. Will collect CBCD and CMP prior to surgery. All questions and concerns were addressed with the patient and she is comfortable with the plan.      Defined Sensitive Document    >50% of total time allocated to visit dedicated to counseling, 50 minutes total.    Signed By: Fallon Sanchez MD     8/31/2021/7:49 AM

## 2021-08-25 NOTE — PROGRESS NOTES
New Patient, Referred by Dr. Elvin Hollingsworth for history of stage 2 breast cancer left, consult to discuss BSO    1. Have you been to the ER, urgent care clinic since your last visit? Hospitalized since your last visit?  no    2. Have you seen or consulted any other health care providers outside of the [address] [place] [address] since your last visit? Include any pap smears or colon screening.    no

## 2021-08-26 ENCOUNTER — OFFICE VISIT (OUTPATIENT)
Dept: ONCOLOGY | Age: 41
End: 2021-08-26

## 2021-08-26 VITALS
TEMPERATURE: 97.2 F | WEIGHT: 129 LBS | RESPIRATION RATE: 16 BRPM | HEIGHT: 62 IN | BODY MASS INDEX: 23.74 KG/M2 | SYSTOLIC BLOOD PRESSURE: 140 MMHG | OXYGEN SATURATION: 98 % | HEART RATE: 59 BPM | DIASTOLIC BLOOD PRESSURE: 94 MMHG

## 2021-08-26 DIAGNOSIS — Z17.0 MALIGNANT NEOPLASM OF CENTRAL PORTION OF LEFT BREAST IN FEMALE, ESTROGEN RECEPTOR POSITIVE (HCC): Primary | ICD-10-CM

## 2021-08-26 DIAGNOSIS — C50.112 MALIGNANT NEOPLASM OF CENTRAL PORTION OF LEFT BREAST IN FEMALE, ESTROGEN RECEPTOR POSITIVE (HCC): Primary | ICD-10-CM

## 2021-08-26 NOTE — PROGRESS NOTES
Mikayla Michel a 73 G. o. female here for office visit and Lupron injection.  Lupron injection in a sealed container from her local pharmacy. Lupron 3.75 mg given IM in left buttock without difficulty by Michael Rivera RN.  Patient tolerated well.  Patient to schedule next injection for 28 days.     Expiration: 2/12/2024  Lot number: [id number]  : [place]    1. Have you been to the ER, urgent care clinic since your last visit? Hospitalized since your last visit?: No.    2. Have you seen or consulted any other health care providers outside of the [address] [place] [address] since your last visit? Include any pap smears or colon screening.: No.    Patient states that she checked her blood pressure at home this morning and it was 122/80.

## 2021-09-07 ENCOUNTER — OFFICE VISIT (OUTPATIENT)
Dept: ONCOLOGY | Age: 41
End: 2021-09-07
Payer: COMMERCIAL

## 2021-09-07 ENCOUNTER — HOSPITAL ENCOUNTER (OUTPATIENT)
Dept: INFUSION THERAPY | Age: 41
Discharge: HOME | End: 2021-09-07
Payer: COMMERCIAL

## 2021-09-07 VITALS
HEIGHT: 62 IN | BODY MASS INDEX: 23.74 KG/M2 | RESPIRATION RATE: 16 BRPM | SYSTOLIC BLOOD PRESSURE: 151 MMHG | HEART RATE: 61 BPM | TEMPERATURE: 98 F | WEIGHT: 129 LBS | OXYGEN SATURATION: 99 % | DIASTOLIC BLOOD PRESSURE: 57 MMHG

## 2021-09-07 VITALS
RESPIRATION RATE: 16 BRPM | WEIGHT: 129.6 LBS | DIASTOLIC BLOOD PRESSURE: 77 MMHG | TEMPERATURE: 98 F | BODY MASS INDEX: 23.85 KG/M2 | HEART RATE: 65 BPM | HEIGHT: 62 IN | OXYGEN SATURATION: 99 % | SYSTOLIC BLOOD PRESSURE: 122 MMHG

## 2021-09-07 DIAGNOSIS — C50.912 STAGE II BREAST CANCER, LEFT (HCC): Primary | ICD-10-CM

## 2021-09-07 DIAGNOSIS — C50.912 STAGE II BREAST CANCER, LEFT (HCC): ICD-10-CM

## 2021-09-07 LAB
ALBUMIN SERPL-MCNC: 4.1 G/DL (ref 3.5–5)
ALBUMIN/GLOB SERPL: 1.1 {RATIO} (ref 1.1–2.2)
ALP SERPL-CCNC: 77 U/L (ref 45–117)
ALT SERPL-CCNC: 32 U/L (ref 12–78)
ANION GAP SERPL CALC-SCNC: 2 MMOL/L (ref 5–15)
AST SERPL-CCNC: 40 U/L (ref 15–37)
BASOPHILS # BLD: 0.1 K/UL (ref 0–0.1)
BASOPHILS NFR BLD: 1 % (ref 0–1)
BILIRUB SERPL-MCNC: 0.5 MG/DL (ref 0.2–1)
BUN SERPL-MCNC: 13 MG/DL (ref 6–20)
BUN/CREAT SERPL: 19 (ref 12–20)
CALCIUM SERPL-MCNC: 8.7 MG/DL (ref 8.5–10.1)
CHLORIDE SERPL-SCNC: 102 MMOL/L (ref 97–108)
CO2 SERPL-SCNC: 33 MMOL/L (ref 21–32)
CREAT SERPL-MCNC: 0.67 MG/DL (ref 0.55–1.02)
DIFFERENTIAL METHOD BLD: ABNORMAL
EOSINOPHIL # BLD: 0.2 K/UL (ref 0–0.4)
EOSINOPHIL NFR BLD: 4 % (ref 0–7)
ERYTHROCYTE [DISTWIDTH] IN BLOOD BY AUTOMATED COUNT: 12.5 % (ref 11.5–14.5)
GLOBULIN SER CALC-MCNC: 3.7 G/DL (ref 2–4)
GLUCOSE SERPL-MCNC: 90 MG/DL (ref 65–100)
HCT VFR BLD AUTO: 36.3 % (ref 35–47)
HGB BLD-MCNC: 12.4 G/DL (ref 11.5–16)
IMM GRANULOCYTES # BLD AUTO: 0 K/UL (ref 0–0.04)
IMM GRANULOCYTES NFR BLD AUTO: 0 % (ref 0–0.5)
LYMPHOCYTES # BLD: 1.3 K/UL (ref 0.8–3.5)
LYMPHOCYTES NFR BLD: 26 % (ref 12–49)
MCH RBC QN AUTO: 29.7 PG (ref 26–34)
MCHC RBC AUTO-ENTMCNC: 34.2 G/DL (ref 30–36.5)
MCV RBC AUTO: 87.1 FL (ref 80–99)
MONOCYTES # BLD: 0.5 K/UL (ref 0–1)
MONOCYTES NFR BLD: 9 % (ref 5–13)
NEUTS SEG # BLD: 3 K/UL (ref 1.8–8)
NEUTS SEG NFR BLD: 60 % (ref 32–75)
NRBC # BLD: 0 K/UL (ref 0–0.01)
NRBC BLD-RTO: 0 PER 100 WBC
PLATELET # BLD AUTO: 143 K/UL (ref 150–400)
PMV BLD AUTO: 10 FL (ref 8.9–12.9)
POTASSIUM SERPL-SCNC: 3.3 MMOL/L (ref 3.5–5.1)
PROT SERPL-MCNC: 7.8 G/DL (ref 6.4–8.2)
RBC # BLD AUTO: 4.17 M/UL (ref 3.8–5.2)
SODIUM SERPL-SCNC: 137 MMOL/L (ref 136–145)
WBC # BLD AUTO: 5 K/UL (ref 3.6–11)

## 2021-09-07 PROCEDURE — 74011250637 HC RX REV CODE- 250/637: Performed by: INTERNAL MEDICINE

## 2021-09-07 PROCEDURE — 80053 COMPREHEN METABOLIC PANEL: CPT

## 2021-09-07 PROCEDURE — 96413 CHEMO IV INFUSION 1 HR: CPT

## 2021-09-07 PROCEDURE — 99215 OFFICE O/P EST HI 40 MIN: CPT | Performed by: NURSE PRACTITIONER

## 2021-09-07 PROCEDURE — 77030012965 HC NDL HUBR BBMI -A

## 2021-09-07 PROCEDURE — 74011250636 HC RX REV CODE- 250/636: Performed by: NURSE PRACTITIONER

## 2021-09-07 PROCEDURE — 36415 COLL VENOUS BLD VENIPUNCTURE: CPT

## 2021-09-07 PROCEDURE — 96375 TX/PRO/DX INJ NEW DRUG ADDON: CPT

## 2021-09-07 PROCEDURE — 85025 COMPLETE CBC W/AUTO DIFF WBC: CPT

## 2021-09-07 RX ORDER — ONDANSETRON 2 MG/ML
8 INJECTION INTRAMUSCULAR; INTRAVENOUS ONCE
Status: COMPLETED | OUTPATIENT
Start: 2021-09-07 | End: 2021-09-07

## 2021-09-07 RX ORDER — POTASSIUM CHLORIDE 1.5 G/1.77G
40 POWDER, FOR SOLUTION ORAL
Status: COMPLETED | OUTPATIENT
Start: 2021-09-07 | End: 2021-09-07

## 2021-09-07 RX ORDER — SODIUM CHLORIDE 9 MG/ML
25 INJECTION, SOLUTION INTRAVENOUS CONTINUOUS
Status: DISCONTINUED | OUTPATIENT
Start: 2021-09-07 | End: 2021-09-08 | Stop reason: HOSPADM

## 2021-09-07 RX ADMIN — ONDANSETRON 8 MG: 2 INJECTION INTRAMUSCULAR; INTRAVENOUS at 14:52

## 2021-09-07 RX ADMIN — ADO-TRASTUZUMAB EMTANSINE 200 MG: 20 INJECTION, POWDER, LYOPHILIZED, FOR SOLUTION INTRAVENOUS at 15:50

## 2021-09-07 RX ADMIN — SODIUM CHLORIDE 25 ML/HR: 9 INJECTION, SOLUTION INTRAVENOUS at 14:49

## 2021-09-07 RX ADMIN — POTASSIUM CHLORIDE 40 MEQ: 1.5 POWDER, FOR SOLUTION ORAL at 15:53

## 2021-09-07 NOTE — PROGRESS NOTES
[facility] [place] at [facility]  [address] [facility]  Epi Araujo: [phone]  F: [phone]    Reason for Visit:   Farheen Finley is a 42 y.o. female who is seen for follow up of breast cancer    Breast surgeon:  Dr. Rhett Conner surg:  Dr. Marina Trujillo onc:  Dr. Colin Becerra    Treatment History:   · 20 left breast ax core bx: Adenocarcinoma, ER + at 86% ; IA + at 69%; HER 2 POSITIVE (IHC 2+, FISH ratio 3.2; sig/cell 6.08), ki67 29%  · 20 L breast core bx: Atypical 1 mm focus, highly suspicious for [facility], lobular features, the tumor does not histologically match the patient's prior axillary cancer, but could represent a small sample of the same tumor  · 20 L breast excisional bx: Subareolar lumpectomy, IDC, 1.6 cm, invasive tumor present at anterior and lateral margins, gr 2, + LVI, 1/1 LN involved, 1.1 cm, ER + at 48%, IA + at 72%, HER 2 POSITIVE (IHC 2+, FISH ratio 2, sig/cell 4.14)  · 20 Brigida Stephy genetic testing:  negative  · TCHP 2020-2020  · 2020 Bilateral mastectomy: Right breast: benign. Left breast: Inflammation, no residual carcinoma, 2/6 LNs (micromets in both, 1.7 mm, and 1.1 mm). pT1c ypN1mi cM0  · T-DM1 20-21  · XRT 20-21   · Anastrozole 2021-21 (menses returned); 21-  · lupron 21-    History of Present Illness:   She noticed a L axilla mass in 2020, leading to the pathology above. Interval history:  Reports gr 1 hot flashes, gr 1 anxiety    S/p covid 19 vaccines    FH:   No breast, ovarian, prostate, or pancreas cancer    LMP 21    Past Medical History:   Diagnosis Date    Anxiety     Cancer [facility])     Essential hypertension     HX OTHER MEDICAL ,         Infertility     IVF with first pregnancy    Infertility, female     Joint pain     Psychiatric problem       Past Surgical History:   Procedure Laterality Date    HX BILATERAL MASTECTOMY      HX OTHER SURGICAL      Kyle Teeth Removal    HX OTHER SURGICAL      1/2017 Excision of mole on toe with skin graph      Social History     Tobacco Use    Smoking status: Never Smoker    Smokeless tobacco: Never Used   Substance Use Topics    Alcohol use: Yes      Family History   Problem Relation Age of Onset    Diabetes Mother     Heart Disease Mother     Hypertension Mother     Hypertension Father     Cancer Paternal Grandmother         Lung cancer    Stroke Paternal Grandmother     Cancer Paternal Grandfather         Melanoma     Current Outpatient Medications   Medication Sig    olmesartan-hydroCHLOROthiazide (BENICAR HCT) 20-12.5 mg per tablet Take 1 Tablet by mouth daily.  metoprolol tartrate (LOPRESSOR) 50 mg tablet Take 1 Tablet by mouth two (2) times a day.  leuprolide depot (LUPRON) 3.75 mg injection 1 Each by IntraMUSCular route every four (4) weeks.  loratadine (Claritin) 10 mg tablet Take 10 mg by mouth.  fluticasone propionate (FLONASE ALLERGY RELIEF NA) by Nasal route.  anastrozole (ARIMIDEX) 1 mg tablet Take 1 mg by mouth daily.  acetaminophen (TYLENOL PO) Take  by mouth.  lidocaine-prilocaine (EMLA) topical cream Apply  to affected area as needed for Pain.  ondansetron (ZOFRAN ODT) 8 mg disintegrating tablet Take 1 Tab by mouth every eight (8) hours as needed for Nausea or Vomiting. For 2 days following chemo    ibuprofen (MOTRIN) 600 mg tablet Take 1 Tab by mouth every six (6) hours as needed for Pain.  sertraline (ZOLOFT) 25 mg tablet Take 25 mg by mouth daily.  hydroCHLOROthiazide (HYDRODIURIL) 12.5 mg tablet Take 1 Tablet by mouth daily. (Patient not taking: Reported on 9/7/2021)     No current facility-administered medications for this visit. No Known Allergies     Review of Systems: A complete review of systems was obtained, negative except as described above.     Physical Exam:     Visit Vitals  BP (!) 151/57   Pulse 61   Temp 98 °F (36.7 °C) (Temporal)   Resp 16   Ht 5' 2\" (1.575 m)   Wt 129 lb (58.5 kg)   SpO2 99%   BMI 23.59 kg/m²     ECOG PS: 0  General: no distress  Eyes: anicteric sclerae  HENT: oropharynx clear  Neck: supple  Lymphatic: no cervical, supraclavicular adenopathy  Respiratory: normal respiratory effort  CV: no peripheral edema  GI: nondistended  Skin: no rashes; no ecchymoses; no petechiae    Results:     Lab Results   Component Value Date/Time    WBC 5.0 09/07/2021 01:21 PM    HGB 12.4 09/07/2021 01:21 PM    HCT 36.3 09/07/2021 01:21 PM    PLATELET 468 (L) 65/08/4025 01:21 PM    MCV 87.1 09/07/2021 01:21 PM    ABS. NEUTROPHILS 3.0 09/07/2021 01:21 PM     Lab Results   Component Value Date/Time    Sodium 137 08/17/2021 10:37 AM    Potassium 3.6 08/17/2021 10:37 AM    Chloride 102 08/17/2021 10:37 AM    CO2 33 (H) 08/17/2021 10:37 AM    Glucose 98 08/17/2021 10:37 AM    BUN 11 08/17/2021 10:37 AM    Creatinine 0.71 08/17/2021 10:37 AM    GFR est AA >60 08/17/2021 10:37 AM    GFR est non-AA >60 08/17/2021 10:37 AM    Calcium 8.9 08/17/2021 10:37 AM     Lab Results   Component Value Date/Time    Bilirubin, total 0.6 08/17/2021 10:37 AM    ALT (SGPT) 31 08/17/2021 10:37 AM    Alk. phosphatase 70 08/17/2021 10:37 AM    Protein, total 8.1 08/17/2021 10:37 AM    Albumin 4.3 08/17/2021 10:37 AM    Globulin 3.8 08/17/2021 10:37 AM     5/20/20 breast  MRI  Subareolar region of L breast 1.2 cm mass, at least 2 LN on left, 3 cm largest  Right breast negative    5/20/20 bone scan  Heterogeneous activity in the ribs of uncertain significance. This would be an unusual presentation of bony metastatic disease    5/20/20 CT c/a/p  negative    12/26/20 estradiol 8.6  FSH 95  LH 38.5    5/4/21 estradiol 441  FSH 8  LH 7.8    Records reviewed and summarized above.       Assessment/plan:   1.  L breast cancer, LN + by core, ER +, CO +, HER 2 POSITIVE, cT1c cN1a cM0:  Stage IIA, prognostic stage IB  premenopausal    We explained to the patient that the goal of systemic adjuvant therapy is to improve the chances for cure and decrease the risk of relapse. We explained why a patient can have microscopic cancer spread now even though physical examination, laboratory studies and imaging studies are negative for cancer. We explained that the same treatments used now as adjuvant or preventive treatments rarely if ever are curative in women who develop metastases. TTE on 5/28/2020 EF 64%, TTE on 8/10/2020, EF 60%, TTE on 9/11/2020, EF 61%, overall stable, TTE on 12/2/2020, EF 60%, GLS stable. 3/8/21 TTE EF 62%, GLS normal; 6/2/21 TTE stable without change. Repeat ordered    Bilateral mastectomies on 11/12/2020, no residual carcinoma in breast, 2/6 LNs (micromets). Discussed that with residual disease, will change from trastuzumab and pertuzumab to T-DM1 3.6 mg/kg IV q 3 weeks x 14 doses.  Side effects of T-DM1 include hepatotoxicity, pulmonary tox, thrombocytopenia, infusion reaction (rare), cardiotoxicity.  She is agreeable and has signed informed consent. T-DM1 #14/14 today    Discussed neratinib 240 mg daily with food for one year after the completion of herceptin. Discussed that there was only a 2% DFS benefit and that benefit was largely driven in the ER + population. Discussed that there are no data in its use post-T-DM1. She and I decline this at this time     Discussed common side effects of neratinib including but not limited to diarrhea, nausea, abdominal pain, fatigue, vomiting, rash, swollen and sore mouth (stomatitis), decreased appetite, muscle spasms, indigestion (dyspepsia), liver damage (AST or ALT enzyme increase), nail disorder, dry skin, abdominal swelling (distention), weight loss and urinary tract infection.     She is premenopausal, her cycles have returned    We discussed that for high risk women with breast cancer (having either received chemotherapy or < 28years old), ovarian suppression(such as monthly lupron 3.75 mg IM) + AI was superior in terms of overall survival than tamoxifen alone. The added risks of worse QOL due to depression and worse menopausal symptoms were discussed with the addition of ovarian suppression. The risks and benefits of tamoxifen were discussed in detail and the patient was informed of the following: Risks include a 1% risk of endometrial cancer for postmenopausal women treated for five years but no (or a minimally increased) risk in premenopausal women and that most women who develop tamoxifen-associated endometrial cancer can be cured. Any bleeding in a postmenopausal woman should be reported to a health care professional. There is also a 1% risk of blood clots (thromboembolism) that can be fatal. All patients irrespective of age who take tamoxifen and who have not had a hysterectomy should have a pelvic exam and Pap smear yearly. Tamoxifen increases the risk of cataract formation and on rare occasions has caused retinal damage: an eye exam is recommended yearly. Other risks include vaginal discharge or dryness, the development or worsening of hot flashes or vasomotor symptoms, and bone loss in premenopausal women. There is excellent evidence that tamoxifen does not increase risk of depression, cause weight gain or have a major effect on sexual function. Available data suggests little or no effect on cognitive function. Benefits include a lowering of cholesterol and a reduction in the rate of bone loss for postmenopausal woman. Any other symptoms should be reported. After this discussion, she is agreeable to lupron 3.75 mg q 4 weeks and anastrozole (started after her 2nd injection). She is interested in discussing bilateral oophorectomy, referred to gyn onc. She has seen Dr. Michel Shore, is considering    We will plan to see the patient in follow up at least once per cycle, or sooner if symptoms warrant.  Labs with each cycle to monitor for toxicity    She brought up [id number], Vaccine to prevent recurrence in patients with HER-2 positive breast cancer. She appears eligible. Closest site is [employer]) or [employer] . She will consider. I do not see a downside other than travel for this. Will refer [facility] to [facility] for this. Port out next week    2. Emotional well being:  She has excellent support and is coping well with her disease; on zoloft 75 mg daily    3. Genetic testing:  discussed potential ramifications of a positive test including the potential need for bilateral mastectomies and bilateral oophorectomies and the risk then for her family members to also have a mutation. VUS also discussed. Tested in Dr. Elaina Rubinstein office on 5/21/20, negative    4. Loss of fertility:  Discussed potential loss of menses and fertility. She is not interested in having further children. Declines oncofertility consult    5. Decline in LV strain and HTN: 7.8% decline since initial TTE on 5/28/2020, started on Coreg 3.125 mg BID. Repeat TTE on 9/10/2020, EF 61%, stable, 12/2/2020, stable 60%, strain stable. 3/8/21 TTE EF 62%, strain improved and normal.  Previously increased to coreg 12.5 mg bid. TTE 6/2/2021 with EF 62%, no interval change. TTE ordered, but not done. 6. Thrombocytopenia:  Due to chemo. Will monitor. T-DM1 may cause thrombocytopenia    7. Joint pain:  Due to AI; improved    8. L side ROM issues:  Previously referred to PT. 9. HTN. Diagnosed during her 4th pregnancy. Metoprolol 50mg BID. Also on benicar 12.5-25 mg daily; improved at home    10. ast elevated:  Likely due to T-DM1, mild, monitoring      Signed By: Stacy Fermin MD      No orders of the defined types were placed in this encounter.

## 2021-09-07 NOTE — PROGRESS NOTES
[facility] VISIT NOTE  Date: 2021    Name: Miracle Tabares    MRN: [id number]         : 1980    1315  Ms. Jaqueline Owens Arrived ambulatory and in no distress for C14D1 of Kadcyla Regimen. Assessment was completed, no acute issues at this time, no new complaints voiced. Right chest wall port accessed without difficulty, labs drawn & sent for processing. 1. Do you have any symptoms of COVID-19? SOB, coughing, fever, or generally not feeling well NO    2. Have you been exposed to COVID-19 recently? NO    3. Have you had any recent contact with family/friend that has a pending COVID test? NO       Chemotherapy Flowsheet 2021   Cycle C14D1   Date 2021   Drug / Regimen Kadcyla   Pre Meds given   Notes given           Ms. Mulligan's vitals were reviewed. Patient Vitals for the past 12 hrs:   Temp Pulse Resp BP SpO2   21 1622  65  122/77    21 1316 98 °F (36.7 °C) 61 16 (!) 151/57 99 %         Lab results were obtained and reviewed. Recent Results (from the past 12 hour(s))   CBC WITH AUTOMATED DIFF    Collection Time: 21  1:21 PM   Result Value Ref Range    WBC 5.0 3.6 - 11.0 K/uL    RBC 4.17 3.80 - 5.20 M/uL    HGB 12.4 11.5 - 16.0 g/dL    HCT 36.3 35.0 - 47.0 %    MCV 87.1 80.0 - 99.0 FL    MCH 29.7 26.0 - 34.0 PG    MCHC 34.2 30.0 - 36.5 g/dL    RDW 12.5 11.5 - 14.5 %    PLATELET 605 (L) 510 - 400 K/uL    MPV 10.0 8.9 - 12.9 FL    NRBC 0.0 0  WBC    ABSOLUTE NRBC 0.00 0.00 - 0.01 K/uL    NEUTROPHILS 60 32 - 75 %    LYMPHOCYTES 26 12 - 49 %    MONOCYTES 9 5 - 13 %    EOSINOPHILS 4 0 - 7 %    BASOPHILS 1 0 - 1 %    IMMATURE GRANULOCYTES 0 0.0 - 0.5 %    ABS. NEUTROPHILS 3.0 1.8 - 8.0 K/UL    ABS. LYMPHOCYTES 1.3 0.8 - 3.5 K/UL    ABS. MONOCYTES 0.5 0.0 - 1.0 K/UL    ABS. EOSINOPHILS 0.2 0.0 - 0.4 K/UL    ABS. BASOPHILS 0.1 0.0 - 0.1 K/UL    ABS. IMM.  GRANS. 0.0 0.00 - 0.04 K/UL    DF AUTOMATED     METABOLIC PANEL, COMPREHENSIVE    Collection Time: 21  1:21 PM   Result Value Ref Range    Sodium 137 136 - 145 mmol/L    Potassium 3.3 (L) 3.5 - 5.1 mmol/L    Chloride 102 97 - 108 mmol/L    CO2 33 (H) 21 - 32 mmol/L    Anion gap 2 (L) 5 - 15 mmol/L    Glucose 90 65 - 100 mg/dL    BUN 13 6 - 20 MG/DL    Creatinine 0.67 0.55 - 1.02 MG/DL    BUN/Creatinine ratio 19 12 - 20      GFR est AA >60 >60 ml/min/1.73m2    GFR est non-AA >60 >60 ml/min/1.73m2    Calcium 8.7 8.5 - 10.1 MG/DL    Bilirubin, total 0.5 0.2 - 1.0 MG/DL    ALT (SGPT) 32 12 - 78 U/L    AST (SGOT) 40 (H) 15 - 37 U/L    Alk. phosphatase 77 45 - 117 U/L    Protein, total 7.8 6.4 - 8.2 g/dL    Albumin 4.1 3.5 - 5.0 g/dL    Globulin 3.7 2.0 - 4.0 g/dL    A-G Ratio 1.1 1.1 - 2.2         Medications received:  Medications Administered     0.9% sodium chloride infusion     Admin Date  09/07/2021 Action  New Bag Dose  25 mL/hr Rate  25 mL/hr Route  IntraVENous Administered By  Jessica Felix RN          ADO-trastuzumab emtansine (KADCYLA) 200 mg in 0.9% sodium chloride 250 mL, overfill volume 25 mL Chemo infusion     Admin Date  09/07/2021 Action  New Bag Dose  200 mg Rate  570 mL/hr Route  IntraVENous Administered By  Jessica Felix RN          [facility]ansetron [facility] injection 8 mg     Admin Date  09/07/2021 Action  Given Dose  8 mg Route  IntraVENous Administered By  Jessica Felix RN          potassium chloride (KLOR-CON) packet for solution 40 mEq     Admin Date  09/07/2021 Action  Given Dose  40 mEq Route  Oral Administered By  Jessica Felix RN                 Ms. Melvin Brand tolerated treatment well and was discharged from [place] [address] in stable condition at 1625. Port de-accessed, flushed & heparinized per protocol. She is to contact her provider for any future [facility] appointments. Savita MondayFAISAL  September 7, 2021    Future Appointments:  No future appointments.

## 2021-09-07 NOTE — PROGRESS NOTES
Heidy Dee is a 42 y.o. female Follow up for the evaluation of breast cancer. 1. Have you been to the ER, urgent care clinic since your last visit? Hospitalized since your last visit? No    2. Have you seen or consulted any other health care providers outside of the [address] [place] [address] since your last visit? Include any pap smears or colon screening.  No

## 2021-09-13 DIAGNOSIS — Z17.0 MALIGNANT NEOPLASM OF CENTRAL PORTION OF LEFT BREAST IN FEMALE, ESTROGEN RECEPTOR POSITIVE (HCC): ICD-10-CM

## 2021-09-13 DIAGNOSIS — C50.112 MALIGNANT NEOPLASM OF CENTRAL PORTION OF LEFT BREAST IN FEMALE, ESTROGEN RECEPTOR POSITIVE (HCC): ICD-10-CM

## 2021-09-15 ENCOUNTER — ANCILLARY PROCEDURE (OUTPATIENT)
Dept: CARDIOLOGY CLINIC | Age: 41
End: 2021-09-15
Payer: COMMERCIAL

## 2021-09-15 VITALS
HEIGHT: 62 IN | BODY MASS INDEX: 23.74 KG/M2 | WEIGHT: 129 LBS | SYSTOLIC BLOOD PRESSURE: 128 MMHG | DIASTOLIC BLOOD PRESSURE: 80 MMHG

## 2021-09-15 DIAGNOSIS — C50.912 STAGE II BREAST CANCER, LEFT (HCC): ICD-10-CM

## 2021-09-15 DIAGNOSIS — Z79.899 ENCOUNTER FOR MONITORING CARDIOTOXIC DRUG THERAPY: ICD-10-CM

## 2021-09-15 DIAGNOSIS — Z51.81 ENCOUNTER FOR MONITORING CARDIOTOXIC DRUG THERAPY: ICD-10-CM

## 2021-09-15 LAB
ECHO AO ASC DIAM: 2.95 CM
ECHO AO ROOT DIAM: 2.97 CM
ECHO AV AREA PEAK VELOCITY: 2.67 CM2
ECHO AV AREA VTI: 2.59 CM2
ECHO AV AREA/BSA PEAK VELOCITY: 1.7 CM2/M2
ECHO AV AREA/BSA VTI: 1.6 CM2/M2
ECHO AV MEAN GRADIENT: 2.72 MMHG
ECHO AV PEAK GRADIENT: 5.59 MMHG
ECHO AV PEAK VELOCITY: 118.19 CM/S
ECHO AV VTI: 24.2 CM
ECHO EST RA PRESSURE: 3 MMHG
ECHO IVC PROX: 2.02 CM
ECHO LA AREA 4C: 16.38 CM2
ECHO LA MAJOR AXIS: 3.12 CM
ECHO LA MINOR AXIS: 1.96 CM
ECHO LA VOL 2C: 31.23 ML (ref 22–52)
ECHO LA VOL 4C: 47.37 ML (ref 22–52)
ECHO LA VOL BP: 41.32 ML (ref 22–52)
ECHO LA VOL/BSA BIPLANE: 25.99 ML/M2 (ref 16–28)
ECHO LA VOLUME INDEX A2C: 19.64 ML/M2 (ref 16–28)
ECHO LA VOLUME INDEX A4C: 29.79 ML/M2 (ref 16–28)
ECHO LV E' LATERAL VELOCITY: 10.14 CM/S
ECHO LV E' SEPTAL VELOCITY: 8.91 CM/S
ECHO LV EDV A2C: 116.57 ML
ECHO LV EDV A4C: 112.71 ML
ECHO LV EDV BP: 115.78 ML (ref 56–104)
ECHO LV EDV INDEX A4C: 70.9 ML/M2
ECHO LV EDV INDEX BP: 72.8 ML/M2
ECHO LV EDV NDEX A2C: 73.3 ML/M2
ECHO LV EJECTION FRACTION A2C: 64 PERCENT
ECHO LV EJECTION FRACTION A4C: 61 PERCENT
ECHO LV EJECTION FRACTION BIPLANE: 61.4 PERCENT (ref 55–100)
ECHO LV ESV A2C: 41.55 ML
ECHO LV ESV A4C: 44.05 ML
ECHO LV ESV BP: 44.66 ML (ref 19–49)
ECHO LV ESV INDEX A2C: 26.1 ML/M2
ECHO LV ESV INDEX A4C: 27.7 ML/M2
ECHO LV ESV INDEX BP: 28.1 ML/M2
ECHO LV GLOBAL LONGITUDINAL STRAIN (GLS): -21 PERCENT
ECHO LV INTERNAL DIMENSION DIASTOLIC: 5.07 CM (ref 3.9–5.3)
ECHO LV INTERNAL DIMENSION SYSTOLIC: 3.38 CM
ECHO LV IVSD: 0.59 CM (ref 0.6–0.9)
ECHO LV MASS 2D: 99.2 G (ref 67–162)
ECHO LV MASS INDEX 2D: 62.4 G/M2 (ref 43–95)
ECHO LV POSTERIOR WALL DIASTOLIC: 0.63 CM (ref 0.6–0.9)
ECHO LVOT DIAM: 1.97 CM
ECHO LVOT PEAK GRADIENT: 4.3 MMHG
ECHO LVOT PEAK VELOCITY: 103.73 CM/S
ECHO LVOT SV: 62.6 ML
ECHO LVOT VTI: 20.57 CM
ECHO MV A VELOCITY: 52.47 CM/S
ECHO MV E DECELERATION TIME (DT): 226.72 MS
ECHO MV E VELOCITY: 82.56 CM/S
ECHO MV E/A RATIO: 1.57
ECHO MV E/E' LATERAL: 8.14
ECHO MV E/E' RATIO (AVERAGED): 8.7
ECHO MV E/E' SEPTAL: 9.27
ECHO MV MAX VELOCITY: 100.72 CM/S
ECHO MV MEAN GRADIENT: 1.07 MMHG
ECHO MV PEAK GRADIENT: 4.06 MMHG
ECHO MV PRESSURE HALF TIME (PHT): 65.75 MS
ECHO MV VTI: 32.37 CM
ECHO RA AREA 4C: 13.54 CM2
ECHO RV INTERNAL DIMENSION: 3.62 CM
ECHO RV TAPSE: 2.17 CM (ref 1.5–2)
GLOBAL LONGITUDINAL STRAIN 2 CHAMBER: -22.6 PERCENT
GLOBAL LONGITUDINAL STRAIN 4 CHAMBER: -20.5 PERCENT
GLOBAL LONGITUDINAL STRAIN LONG AXIS: -19.8 PERCENT

## 2021-09-15 PROCEDURE — 93306 TTE W/DOPPLER COMPLETE: CPT | Performed by: INTERNAL MEDICINE

## 2021-09-23 ENCOUNTER — OFFICE VISIT (OUTPATIENT)
Dept: ONCOLOGY | Age: 41
End: 2021-09-23

## 2021-09-23 VITALS
BODY MASS INDEX: 23.59 KG/M2 | HEART RATE: 55 BPM | DIASTOLIC BLOOD PRESSURE: 106 MMHG | HEIGHT: 62 IN | RESPIRATION RATE: 18 BRPM | OXYGEN SATURATION: 96 % | TEMPERATURE: 98 F | SYSTOLIC BLOOD PRESSURE: 145 MMHG

## 2021-09-23 DIAGNOSIS — C50.912 STAGE II BREAST CANCER, LEFT (HCC): ICD-10-CM

## 2021-09-23 RX ORDER — CELECOXIB 200 MG/1
CAPSULE ORAL
COMMUNITY
Start: 2021-09-01

## 2021-09-23 NOTE — PROGRESS NOTES
Fermin Maxwell a 45 F. o. female here for office visit and Lupron injection.  Lupron injection in a sealed container from her local pharmacy. Lupron 3.75 mg given IM in right buttock without difficulty by Abby Weir LPN.  Patient tolerated well.  Patient to schedule next injection for 28 days. 1. Have you been to the ER, urgent care clinic since your last visit? Hospitalized since your last visit? No    2. Have you seen or consulted any other health care providers outside of the [address] [place] [address] since your last visit? Include any pap smears or colon screening.  No    Patient[de-identified] Rickey ROSSI[de-identified] 1980  Medication[de-identified] LupronDepot  Dose[de-identified] 3.75 mg  Route[de-identified] Intramuscular  Site[de-identified] Right buttock  Lot number[de-identified] [id number]  Expiration date[de-identified] 2024  [facility][de-identified] 9026-3036-40  RX #[de-identified] [id number]  Administered by[de-identified] Abby Weir LPN

## 2021-09-24 ENCOUNTER — TELEPHONE (OUTPATIENT)
Dept: ONCOLOGY | Age: 41
End: 2021-09-24

## 2021-09-24 NOTE — TELEPHONE ENCOUNTER
9/24/21 7:07 PM: Emailed Gray Kulkarni at the [address] [place] [address] about referring the patient to them for NCT [id number]. Per Gray Kulkarni, the patient is eligible and RN needs to fax records to his nurse Pedro Mcmullen at [phone]. Provided patient's contact information to [facility] so they can begin the intake process. Records faxed as requested.

## 2021-10-28 ENCOUNTER — OFFICE VISIT (OUTPATIENT)
Dept: ONCOLOGY | Age: 41
End: 2021-10-28

## 2021-10-28 VITALS
HEIGHT: 62 IN | WEIGHT: 132.4 LBS | OXYGEN SATURATION: 99 % | HEART RATE: 76 BPM | SYSTOLIC BLOOD PRESSURE: 130 MMHG | BODY MASS INDEX: 24.37 KG/M2 | DIASTOLIC BLOOD PRESSURE: 85 MMHG | RESPIRATION RATE: 18 BRPM | TEMPERATURE: 97.8 F

## 2021-10-28 DIAGNOSIS — Z17.0 MALIGNANT NEOPLASM OF CENTRAL PORTION OF LEFT BREAST IN FEMALE, ESTROGEN RECEPTOR POSITIVE (HCC): Primary | ICD-10-CM

## 2021-10-28 DIAGNOSIS — C50.112 MALIGNANT NEOPLASM OF CENTRAL PORTION OF LEFT BREAST IN FEMALE, ESTROGEN RECEPTOR POSITIVE (HCC): Primary | ICD-10-CM

## 2021-10-28 NOTE — PROGRESS NOTES
Nicki Snow a 68 Y. o. female here for office visit and Lupron injection.  Lupron injection in a sealed container from her local pharmacy. Lupron 3.75 mg given IM in left buttock without difficulty by Attila Zaragoza RN.  Patient tolerated well.  Patient to schedule next injection for 28 days.     1. Have you been to the ER, urgent care clinic since your last visit? Hospitalized since your last visit? No     2. Have you seen or consulted any other health care providers outside of the [address] [place] [address] since your last visit? Include any pap smears or colon screening.  No     Patient[de-identified] Yady ROSSI[de-identified] 1980  Medication[de-identified] LupronDepot  Dose[de-identified] 3.75 mg  Route[de-identified] Intramuscular  Site[de-identified] Left buttock  Lot number[de-identified] [id number]  Expiration date[de-identified] 2024  [address][de-identified] 7539-0526-83  RX #[de-identified] [id number]  Administered by[de-identified] [address], RN

## 2021-11-09 DIAGNOSIS — Z17.0 MALIGNANT NEOPLASM OF CENTRAL PORTION OF LEFT BREAST IN FEMALE, ESTROGEN RECEPTOR POSITIVE (HCC): ICD-10-CM

## 2021-11-09 DIAGNOSIS — C50.112 MALIGNANT NEOPLASM OF CENTRAL PORTION OF LEFT BREAST IN FEMALE, ESTROGEN RECEPTOR POSITIVE (HCC): ICD-10-CM

## 2021-11-10 RX ORDER — LEUPROLIDE ACETATE 3.75 MG
KIT INTRAMUSCULAR
Qty: 1 KIT | Refills: 4 | Status: SHIPPED | OUTPATIENT
Start: 2021-11-10 | End: 2021-11-18

## 2021-11-16 DIAGNOSIS — C50.112 MALIGNANT NEOPLASM OF CENTRAL PORTION OF LEFT BREAST IN FEMALE, ESTROGEN RECEPTOR POSITIVE (HCC): ICD-10-CM

## 2021-11-16 DIAGNOSIS — Z17.0 MALIGNANT NEOPLASM OF CENTRAL PORTION OF LEFT BREAST IN FEMALE, ESTROGEN RECEPTOR POSITIVE (HCC): ICD-10-CM

## 2021-11-18 RX ORDER — LEUPROLIDE ACETATE 3.75 MG
KIT INTRAMUSCULAR
Qty: 1 KIT | Refills: 4 | Status: SHIPPED | OUTPATIENT
Start: 2021-11-18 | End: 2022-10-25 | Stop reason: ALTCHOICE

## 2021-11-24 ENCOUNTER — OFFICE VISIT (OUTPATIENT)
Dept: ONCOLOGY | Age: 41
End: 2021-11-24

## 2021-11-24 VITALS
DIASTOLIC BLOOD PRESSURE: 77 MMHG | HEIGHT: 62 IN | TEMPERATURE: 97.8 F | HEART RATE: 58 BPM | WEIGHT: 133 LBS | OXYGEN SATURATION: 98 % | SYSTOLIC BLOOD PRESSURE: 123 MMHG | BODY MASS INDEX: 24.48 KG/M2

## 2021-11-24 DIAGNOSIS — Z17.0 MALIGNANT NEOPLASM OF CENTRAL PORTION OF LEFT BREAST IN FEMALE, ESTROGEN RECEPTOR POSITIVE (HCC): Primary | ICD-10-CM

## 2021-11-24 DIAGNOSIS — C50.112 MALIGNANT NEOPLASM OF CENTRAL PORTION OF LEFT BREAST IN FEMALE, ESTROGEN RECEPTOR POSITIVE (HCC): Primary | ICD-10-CM

## 2021-11-24 NOTE — PROGRESS NOTES
Batsheva Sports a 35 K. o. female here for office visit and Lupron injection.  Lupron injection in a sealed container from her local pharmacy. Lupron 3.75 mg given IM in right buttock without difficulty by Oksana Suazo RN.  Patient tolerated well.  Patient to schedule next injection for 28 days.     Patient[de-identified] Finn White DOB[de-identified] 1980  Medication[de-identified] LupronDepot  Dose[de-identified] 3.75 mg  Route[de-identified] Intramuscular  Site[de-identified] Right buttock  Lot number[de-identified] [id number]  Expiration date[de-identified] 2024  [address][de-identified] 6701-0150-38  RX #[de-identified] [id number]  Administered by[de-identified] Earnest Garcia RN

## 2021-12-22 ENCOUNTER — OFFICE VISIT (OUTPATIENT)
Dept: ONCOLOGY | Age: 41
End: 2021-12-22
Payer: COMMERCIAL

## 2021-12-22 VITALS
RESPIRATION RATE: 18 BRPM | TEMPERATURE: 98 F | WEIGHT: 136 LBS | SYSTOLIC BLOOD PRESSURE: 117 MMHG | HEIGHT: 62 IN | BODY MASS INDEX: 25.03 KG/M2 | DIASTOLIC BLOOD PRESSURE: 80 MMHG | HEART RATE: 57 BPM | OXYGEN SATURATION: 99 %

## 2021-12-22 DIAGNOSIS — Z51.81 ENCOUNTER FOR MONITORING CARDIOTOXIC DRUG THERAPY: ICD-10-CM

## 2021-12-22 DIAGNOSIS — Z79.899 ENCOUNTER FOR MONITORING CARDIOTOXIC DRUG THERAPY: ICD-10-CM

## 2021-12-22 DIAGNOSIS — C50.912 STAGE II BREAST CANCER, LEFT (HCC): Primary | ICD-10-CM

## 2021-12-22 PROBLEM — M85.89 OSTEOPENIA OF MULTIPLE SITES: Status: ACTIVE | Noted: 2021-12-22

## 2021-12-22 PROCEDURE — 99214 OFFICE O/P EST MOD 30 MIN: CPT | Performed by: NURSE PRACTITIONER

## 2021-12-22 RX ORDER — ALBUTEROL SULFATE 0.83 MG/ML
2.5 SOLUTION RESPIRATORY (INHALATION) AS NEEDED
Start: 2023-02-09

## 2021-12-22 RX ORDER — SODIUM CHLORIDE 9 MG/ML
25 INJECTION, SOLUTION INTRAVENOUS CONTINUOUS
OUTPATIENT
Start: 2023-07-27

## 2021-12-22 RX ORDER — ACETAMINOPHEN 325 MG/1
650 TABLET ORAL AS NEEDED
Start: 2024-06-27

## 2021-12-22 RX ORDER — SODIUM CHLORIDE 9 MG/ML
10 INJECTION INTRAMUSCULAR; INTRAVENOUS; SUBCUTANEOUS AS NEEDED
Status: CANCELLED | OUTPATIENT
Start: 2022-08-25

## 2021-12-22 RX ORDER — HEPARIN 100 UNIT/ML
300-500 SYRINGE INTRAVENOUS AS NEEDED
Start: 2024-06-27

## 2021-12-22 RX ORDER — ONDANSETRON 2 MG/ML
8 INJECTION INTRAMUSCULAR; INTRAVENOUS AS NEEDED
OUTPATIENT
Start: 2023-07-27

## 2021-12-22 RX ORDER — SODIUM CHLORIDE 9 MG/ML
25 INJECTION, SOLUTION INTRAVENOUS CONTINUOUS
OUTPATIENT
Start: 2024-01-11

## 2021-12-22 RX ORDER — SODIUM CHLORIDE 0.9 % (FLUSH) 0.9 %
10 SYRINGE (ML) INJECTION AS NEEDED
OUTPATIENT
Start: 2024-06-27

## 2021-12-22 RX ORDER — EPINEPHRINE 1 MG/ML
0.3 INJECTION, SOLUTION, CONCENTRATE INTRAVENOUS AS NEEDED
Status: CANCELLED | OUTPATIENT
Start: 2022-08-25

## 2021-12-22 RX ORDER — DIPHENHYDRAMINE HYDROCHLORIDE 50 MG/ML
50 INJECTION, SOLUTION INTRAMUSCULAR; INTRAVENOUS AS NEEDED
Status: CANCELLED
Start: 2022-02-17

## 2021-12-22 RX ORDER — ALBUTEROL SULFATE 0.83 MG/ML
2.5 SOLUTION RESPIRATORY (INHALATION) AS NEEDED
Status: CANCELLED
Start: 2022-08-25

## 2021-12-22 RX ORDER — ALBUTEROL SULFATE 0.83 MG/ML
2.5 SOLUTION RESPIRATORY (INHALATION) AS NEEDED
Start: 2023-07-27

## 2021-12-22 RX ORDER — DIPHENHYDRAMINE HYDROCHLORIDE 50 MG/ML
25 INJECTION, SOLUTION INTRAMUSCULAR; INTRAVENOUS AS NEEDED
Status: CANCELLED
Start: 2022-08-25

## 2021-12-22 RX ORDER — SODIUM CHLORIDE 0.9 % (FLUSH) 0.9 %
10 SYRINGE (ML) INJECTION AS NEEDED
OUTPATIENT
Start: 2023-02-09

## 2021-12-22 RX ORDER — DIPHENHYDRAMINE HYDROCHLORIDE 50 MG/ML
25 INJECTION, SOLUTION INTRAMUSCULAR; INTRAVENOUS AS NEEDED
Start: 2024-01-11

## 2021-12-22 RX ORDER — EPINEPHRINE 1 MG/ML
0.3 INJECTION, SOLUTION, CONCENTRATE INTRAVENOUS AS NEEDED
OUTPATIENT
Start: 2024-01-11

## 2021-12-22 RX ORDER — SODIUM CHLORIDE 0.9 % (FLUSH) 0.9 %
10 SYRINGE (ML) INJECTION AS NEEDED
Status: CANCELLED | OUTPATIENT
Start: 2022-02-17

## 2021-12-22 RX ORDER — DIPHENHYDRAMINE HYDROCHLORIDE 50 MG/ML
50 INJECTION, SOLUTION INTRAMUSCULAR; INTRAVENOUS AS NEEDED
Start: 2023-07-27

## 2021-12-22 RX ORDER — HYDROCORTISONE SODIUM SUCCINATE 100 MG/2ML
100 INJECTION, POWDER, FOR SOLUTION INTRAMUSCULAR; INTRAVENOUS AS NEEDED
OUTPATIENT
Start: 2023-02-09

## 2021-12-22 RX ORDER — SODIUM CHLORIDE 9 MG/ML
10 INJECTION INTRAMUSCULAR; INTRAVENOUS; SUBCUTANEOUS AS NEEDED
OUTPATIENT
Start: 2024-06-27

## 2021-12-22 RX ORDER — DIPHENHYDRAMINE HYDROCHLORIDE 50 MG/ML
25 INJECTION, SOLUTION INTRAMUSCULAR; INTRAVENOUS AS NEEDED
Status: CANCELLED
Start: 2022-02-17

## 2021-12-22 RX ORDER — ALBUTEROL SULFATE 0.83 MG/ML
2.5 SOLUTION RESPIRATORY (INHALATION) AS NEEDED
Start: 2024-06-27

## 2021-12-22 RX ORDER — SODIUM CHLORIDE 9 MG/ML
10 INJECTION INTRAMUSCULAR; INTRAVENOUS; SUBCUTANEOUS AS NEEDED
OUTPATIENT
Start: 2023-07-27

## 2021-12-22 RX ORDER — HEPARIN 100 UNIT/ML
300-500 SYRINGE INTRAVENOUS AS NEEDED
Status: CANCELLED
Start: 2022-08-25

## 2021-12-22 RX ORDER — ONDANSETRON 2 MG/ML
8 INJECTION INTRAMUSCULAR; INTRAVENOUS AS NEEDED
OUTPATIENT
Start: 2024-01-11

## 2021-12-22 RX ORDER — ACETAMINOPHEN 325 MG/1
650 TABLET ORAL AS NEEDED
Start: 2024-01-11

## 2021-12-22 RX ORDER — DIPHENHYDRAMINE HYDROCHLORIDE 50 MG/ML
50 INJECTION, SOLUTION INTRAMUSCULAR; INTRAVENOUS AS NEEDED
Start: 2023-02-09

## 2021-12-22 RX ORDER — ALBUTEROL SULFATE 0.83 MG/ML
2.5 SOLUTION RESPIRATORY (INHALATION) AS NEEDED
Start: 2024-01-11

## 2021-12-22 RX ORDER — HYDROCORTISONE SODIUM SUCCINATE 100 MG/2ML
100 INJECTION, POWDER, FOR SOLUTION INTRAMUSCULAR; INTRAVENOUS AS NEEDED
OUTPATIENT
Start: 2024-01-11

## 2021-12-22 RX ORDER — ALBUTEROL SULFATE 0.83 MG/ML
2.5 SOLUTION RESPIRATORY (INHALATION) AS NEEDED
Status: CANCELLED
Start: 2022-02-17

## 2021-12-22 RX ORDER — HYDROCORTISONE SODIUM SUCCINATE 100 MG/2ML
100 INJECTION, POWDER, FOR SOLUTION INTRAMUSCULAR; INTRAVENOUS AS NEEDED
Status: CANCELLED | OUTPATIENT
Start: 2022-02-17

## 2021-12-22 RX ORDER — HEPARIN 100 UNIT/ML
300-500 SYRINGE INTRAVENOUS AS NEEDED
Start: 2024-01-11

## 2021-12-22 RX ORDER — EPINEPHRINE 1 MG/ML
0.3 INJECTION, SOLUTION, CONCENTRATE INTRAVENOUS AS NEEDED
Status: CANCELLED | OUTPATIENT
Start: 2022-02-17

## 2021-12-22 RX ORDER — DIPHENHYDRAMINE HYDROCHLORIDE 50 MG/ML
25 INJECTION, SOLUTION INTRAMUSCULAR; INTRAVENOUS AS NEEDED
Start: 2023-07-27

## 2021-12-22 RX ORDER — HEPARIN 100 UNIT/ML
300-500 SYRINGE INTRAVENOUS AS NEEDED
Start: 2023-07-27

## 2021-12-22 RX ORDER — SODIUM CHLORIDE 0.9 % (FLUSH) 0.9 %
10 SYRINGE (ML) INJECTION AS NEEDED
OUTPATIENT
Start: 2023-07-27

## 2021-12-22 RX ORDER — ACETAMINOPHEN 325 MG/1
650 TABLET ORAL AS NEEDED
Start: 2023-07-27

## 2021-12-22 RX ORDER — EPINEPHRINE 1 MG/ML
0.3 INJECTION, SOLUTION, CONCENTRATE INTRAVENOUS AS NEEDED
OUTPATIENT
Start: 2023-07-27

## 2021-12-22 RX ORDER — HYDROCORTISONE SODIUM SUCCINATE 100 MG/2ML
100 INJECTION, POWDER, FOR SOLUTION INTRAMUSCULAR; INTRAVENOUS AS NEEDED
OUTPATIENT
Start: 2023-07-27

## 2021-12-22 RX ORDER — HEPARIN 100 UNIT/ML
300-500 SYRINGE INTRAVENOUS AS NEEDED
Status: CANCELLED
Start: 2022-02-17

## 2021-12-22 RX ORDER — DIPHENHYDRAMINE HYDROCHLORIDE 50 MG/ML
50 INJECTION, SOLUTION INTRAMUSCULAR; INTRAVENOUS AS NEEDED
Start: 2024-06-27

## 2021-12-22 RX ORDER — DIPHENHYDRAMINE HYDROCHLORIDE 50 MG/ML
25 INJECTION, SOLUTION INTRAMUSCULAR; INTRAVENOUS AS NEEDED
Start: 2023-02-09

## 2021-12-22 RX ORDER — SODIUM CHLORIDE 9 MG/ML
10 INJECTION INTRAMUSCULAR; INTRAVENOUS; SUBCUTANEOUS AS NEEDED
OUTPATIENT
Start: 2023-02-09

## 2021-12-22 RX ORDER — ONDANSETRON 2 MG/ML
8 INJECTION INTRAMUSCULAR; INTRAVENOUS AS NEEDED
Status: CANCELLED | OUTPATIENT
Start: 2022-02-17

## 2021-12-22 RX ORDER — ONDANSETRON 2 MG/ML
8 INJECTION INTRAMUSCULAR; INTRAVENOUS AS NEEDED
Status: CANCELLED | OUTPATIENT
Start: 2022-08-25

## 2021-12-22 RX ORDER — DIPHENHYDRAMINE HYDROCHLORIDE 50 MG/ML
50 INJECTION, SOLUTION INTRAMUSCULAR; INTRAVENOUS AS NEEDED
Start: 2024-01-11

## 2021-12-22 RX ORDER — SODIUM CHLORIDE 9 MG/ML
25 INJECTION, SOLUTION INTRAVENOUS CONTINUOUS
Status: CANCELLED | OUTPATIENT
Start: 2022-02-17

## 2021-12-22 RX ORDER — OXYBUTYNIN CHLORIDE 10 MG/1
10 TABLET, EXTENDED RELEASE ORAL DAILY
Qty: 30 TABLET | Refills: 5 | Status: SHIPPED | OUTPATIENT
Start: 2021-12-22 | End: 2022-03-28

## 2021-12-22 RX ORDER — ONDANSETRON 2 MG/ML
8 INJECTION INTRAMUSCULAR; INTRAVENOUS AS NEEDED
OUTPATIENT
Start: 2024-06-27

## 2021-12-22 RX ORDER — SODIUM CHLORIDE 9 MG/ML
10 INJECTION INTRAMUSCULAR; INTRAVENOUS; SUBCUTANEOUS AS NEEDED
OUTPATIENT
Start: 2024-01-11

## 2021-12-22 RX ORDER — ACETAMINOPHEN 325 MG/1
650 TABLET ORAL AS NEEDED
Start: 2023-02-09

## 2021-12-22 RX ORDER — ACETAMINOPHEN 325 MG/1
650 TABLET ORAL AS NEEDED
Status: CANCELLED
Start: 2022-08-25

## 2021-12-22 RX ORDER — ONDANSETRON 2 MG/ML
8 INJECTION INTRAMUSCULAR; INTRAVENOUS AS NEEDED
OUTPATIENT
Start: 2023-02-09

## 2021-12-22 RX ORDER — HYDROCORTISONE SODIUM SUCCINATE 100 MG/2ML
100 INJECTION, POWDER, FOR SOLUTION INTRAMUSCULAR; INTRAVENOUS AS NEEDED
OUTPATIENT
Start: 2024-06-27

## 2021-12-22 RX ORDER — SODIUM CHLORIDE 9 MG/ML
10 INJECTION INTRAMUSCULAR; INTRAVENOUS; SUBCUTANEOUS AS NEEDED
Status: CANCELLED | OUTPATIENT
Start: 2022-02-17

## 2021-12-22 RX ORDER — SODIUM CHLORIDE 9 MG/ML
25 INJECTION, SOLUTION INTRAVENOUS CONTINUOUS
OUTPATIENT
Start: 2023-02-09

## 2021-12-22 RX ORDER — SODIUM CHLORIDE 0.9 % (FLUSH) 0.9 %
10 SYRINGE (ML) INJECTION AS NEEDED
OUTPATIENT
Start: 2024-01-11

## 2021-12-22 RX ORDER — SODIUM CHLORIDE 9 MG/ML
25 INJECTION, SOLUTION INTRAVENOUS CONTINUOUS
Status: CANCELLED | OUTPATIENT
Start: 2022-08-25

## 2021-12-22 RX ORDER — HEPARIN 100 UNIT/ML
300-500 SYRINGE INTRAVENOUS AS NEEDED
Start: 2023-02-09

## 2021-12-22 RX ORDER — HYDROCORTISONE SODIUM SUCCINATE 100 MG/2ML
100 INJECTION, POWDER, FOR SOLUTION INTRAMUSCULAR; INTRAVENOUS AS NEEDED
Status: CANCELLED | OUTPATIENT
Start: 2022-08-25

## 2021-12-22 RX ORDER — DIPHENHYDRAMINE HYDROCHLORIDE 50 MG/ML
50 INJECTION, SOLUTION INTRAMUSCULAR; INTRAVENOUS AS NEEDED
Status: CANCELLED
Start: 2022-08-25

## 2021-12-22 RX ORDER — DIPHENHYDRAMINE HYDROCHLORIDE 50 MG/ML
25 INJECTION, SOLUTION INTRAMUSCULAR; INTRAVENOUS AS NEEDED
Start: 2024-06-27

## 2021-12-22 RX ORDER — EPINEPHRINE 1 MG/ML
0.3 INJECTION, SOLUTION, CONCENTRATE INTRAVENOUS AS NEEDED
OUTPATIENT
Start: 2023-02-09

## 2021-12-22 RX ORDER — EPINEPHRINE 1 MG/ML
0.3 INJECTION, SOLUTION, CONCENTRATE INTRAVENOUS AS NEEDED
OUTPATIENT
Start: 2024-06-27

## 2021-12-22 RX ORDER — SODIUM CHLORIDE 0.9 % (FLUSH) 0.9 %
10 SYRINGE (ML) INJECTION AS NEEDED
Status: CANCELLED | OUTPATIENT
Start: 2022-08-25

## 2021-12-22 RX ORDER — SODIUM CHLORIDE 9 MG/ML
25 INJECTION, SOLUTION INTRAVENOUS CONTINUOUS
OUTPATIENT
Start: 2024-06-27

## 2021-12-22 RX ORDER — ACETAMINOPHEN 325 MG/1
650 TABLET ORAL AS NEEDED
Status: CANCELLED
Start: 2022-02-17

## 2021-12-22 NOTE — PROGRESS NOTES
[facility] [place] [address] [address] [facility] [phone]  F: [phone]    Reason for Visit:   Ronal Mendoza is a 42 y.o. female who is seen for follow up of breast cancer    Breast surgeon:  Dr. Dorian Black surg:  Dr. Nonah Dancer onc:  Dr. Amy Burt    Treatment History:   · 20 left breast ax core bx: Adenocarcinoma, ER + at 86% ; CO + at 69%; HER 2 POSITIVE (IHC 2+, FISH ratio 3.2; sig/cell 6.08), ki67 29%  · 20 L breast core bx: Atypical 1 mm focus, highly suspicious for ACUITY [facility], lobular features, the tumor does not histologically match the patient's prior axillary cancer, but could represent a small sample of the same tumor  · 20 L breast excisional bx: Subareolar lumpectomy, IDC, 1.6 cm, invasive tumor present at anterior and lateral margins, gr 2, + LVI, 1/1 LN involved, 1.1 cm, ER + at 48%, CO + at 72%, HER 2 POSITIVE (IHC 2+, FISH ratio 2, sig/cell 4.14)  · 20 Serafin Broccoli genetic testing:  negative  · TCHP 2020-2020  · 2020 Bilateral mastectomy: Right breast: benign. Left breast: Inflammation, no residual carcinoma, 2/6 LNs (micromets in both, 1.7 mm, and 1.1 mm). pT1c ypN1mi cM0  · T-DM1 20-21  · XRT 20-21   · Anastrozole 2021-21 (menses returned); 21-  · Lupron 21-    History of Present Illness:   She noticed a L axilla mass in 2020, leading to the pathology above. Interval history:  Reports gr 1 hot flashes, gr 1 anxiety    S/p covid 19 vaccines    FH:   No breast, ovarian, prostate, or pancreas cancer    LMP 21    Past Medical History:   Diagnosis Date    Anxiety     Cancer [facility])     Essential hypertension     HX OTHER MEDICAL ,         Infertility     IVF with first pregnancy    Infertility, female     Joint pain     Psychiatric problem       Past Surgical History:   Procedure Laterality Date    HX BILATERAL MASTECTOMY      HX OTHER SURGICAL      [place] Teeth Removal    HX OTHER SURGICAL      1/2017 Excision of mole on toe with skin graph      Social History     Tobacco Use    Smoking status: Never Smoker    Smokeless tobacco: Never Used   Substance Use Topics    Alcohol use: Yes      Family History   Problem Relation Age of Onset    Diabetes Mother     Heart Disease Mother     Hypertension Mother     Hypertension Father     Cancer Paternal Grandmother         Lung cancer    Stroke Paternal Grandmother     Cancer Paternal Grandfather         Melanoma     Current Outpatient Medications   Medication Sig    Lupron Depot 3.75 mg injection RECONSTITUTE AS DIRECTED. INJECT 3.75 MG INTRAMUSCULARLY EVERY 4 WEEKS.  celecoxib (CELEBREX) 200 mg capsule     olmesartan-hydroCHLOROthiazide (BENICAR HCT) 20-12.5 mg per tablet Take 1 Tablet by mouth daily.  hydroCHLOROthiazide (HYDRODIURIL) 12.5 mg tablet Take 1 Tablet by mouth daily.  metoprolol tartrate (LOPRESSOR) 50 mg tablet Take 1 Tablet by mouth two (2) times a day.  loratadine (Claritin) 10 mg tablet Take 10 mg by mouth.  fluticasone propionate (FLONASE ALLERGY RELIEF NA) by Nasal route.  anastrozole (ARIMIDEX) 1 mg tablet Take 1 mg by mouth daily.  acetaminophen (TYLENOL PO) Take  by mouth.  lidocaine-prilocaine (EMLA) topical cream Apply  to affected area as needed for Pain.  ondansetron (ZOFRAN ODT) 8 mg disintegrating tablet Take 1 Tab by mouth every eight (8) hours as needed for Nausea or Vomiting. For 2 days following chemo    ibuprofen (MOTRIN) 600 mg tablet Take 1 Tab by mouth every six (6) hours as needed for Pain.  sertraline (ZOLOFT) 25 mg tablet Take 25 mg by mouth daily. No current facility-administered medications for this visit. No Known Allergies     Review of Systems: A complete review of systems was obtained, negative except as described above.     Physical Exam:     Visit Vitals  /80   Pulse (!) 57   Temp 98 °F (36.7 °C) (Temporal)   Resp 18   Ht 5' 2\" (1.575 m)   Wt 136 lb (61.7 kg)   SpO2 99%   BMI 24.87 kg/m²     ECOG PS: 0  General: no distress  Eyes: anicteric sclerae  HENT: oropharynx clear  Neck: supple  Lymphatic: no cervical, supraclavicular adenopathy  Respiratory: normal respiratory effort  CV: no peripheral edema  GI: nondistended  Skin: no rashes; no ecchymoses; no petechiae    Results:     Lab Results   Component Value Date/Time    WBC 5.0 09/07/2021 01:21 PM    HGB 12.4 09/07/2021 01:21 PM    HCT 36.3 09/07/2021 01:21 PM    PLATELET 167 (L) 26/82/4075 01:21 PM    MCV 87.1 09/07/2021 01:21 PM    ABS. NEUTROPHILS 3.0 09/07/2021 01:21 PM     Lab Results   Component Value Date/Time    Sodium 137 09/07/2021 01:21 PM    Potassium 3.3 (L) 09/07/2021 01:21 PM    Chloride 102 09/07/2021 01:21 PM    CO2 33 (H) 09/07/2021 01:21 PM    Glucose 90 09/07/2021 01:21 PM    BUN 13 09/07/2021 01:21 PM    Creatinine 0.67 09/07/2021 01:21 PM    GFR est AA >60 09/07/2021 01:21 PM    GFR est non-AA >60 09/07/2021 01:21 PM    Calcium 8.7 09/07/2021 01:21 PM     Lab Results   Component Value Date/Time    Bilirubin, total 0.5 09/07/2021 01:21 PM    ALT (SGPT) 32 09/07/2021 01:21 PM    Alk. phosphatase 77 09/07/2021 01:21 PM    Protein, total 7.8 09/07/2021 01:21 PM    Albumin 4.1 09/07/2021 01:21 PM    Globulin 3.7 09/07/2021 01:21 PM     5/20/20 breast  MRI  Subareolar region of L breast 1.2 cm mass, at least 2 LN on left, 3 cm largest  Right breast negative    5/20/20 bone scan  Heterogeneous activity in the ribs of uncertain significance. This would be an unusual presentation of bony metastatic disease    5/20/20 CT c/a/p  negative    12/26/20 estradiol 8.6  FSH 95  LH 38.5    5/4/21 estradiol 441  FSH 8  LH 7.8    Records reviewed and summarized above.       Assessment/plan:   1.  L breast cancer, LN + by core, ER +, [place] +, HER 2 POSITIVE, cT1c cN1a cM0:  Stage IIA, prognostic stage IB  premenopausal    We explained to the patient that the goal of systemic adjuvant therapy is to improve the chances for cure and decrease the risk of relapse. We explained why a patient can have microscopic cancer spread now even though physical examination, laboratory studies and imaging studies are negative for cancer. We explained that the same treatments used now as adjuvant or preventive treatments rarely if ever are curative in women who develop metastases. TTE on 5/28/2020 EF 64%, TTE on 8/10/2020, EF 60%, TTE on 9/11/2020, EF 61%, overall stable, TTE on 12/2/2020, EF 60%, GLS stable. 3/8/21 TTE EF 62%, GLS normal; 6/2/21 TTE stable without change. Repeat ordered    Bilateral mastectomies on 11/12/2020, no residual carcinoma in breast, 2/6 LNs (micromets). Discussed that with residual disease, will change from trastuzumab and pertuzumab to T-DM1 3.6 mg/kg IV q 3 weeks x 14 doses.  Side effects of T-DM1 include hepatotoxicity, pulmonary tox, thrombocytopenia, infusion reaction (rare), cardiotoxicity.  She is agreeable and has signed informed consent. T-DM1 #14/14 today    Discussed neratinib 240 mg daily with food for one year after the completion of herceptin. Discussed that there was only a 2% DFS benefit and that benefit was largely driven in the ER + population. Discussed that there are no data in its use post-T-DM1. She and I decline this at this time     Discussed common side effects of neratinib including but not limited to diarrhea, nausea, abdominal pain, fatigue, vomiting, rash, swollen and sore mouth (stomatitis), decreased appetite, muscle spasms, indigestion (dyspepsia), liver damage (AST or ALT enzyme increase), nail disorder, dry skin, abdominal swelling (distention), weight loss and urinary tract infection.     She is premenopausal, her cycles have returned    We discussed that for high risk women with breast cancer (having either received chemotherapy or < 28years old), ovarian suppression(such as monthly lupron 3.75 mg IM) + AI was superior in terms of overall survival than tamoxifen alone. The added risks of worse QOL due to depression and worse menopausal symptoms were discussed with the addition of ovarian suppression. The risks and benefits of tamoxifen were discussed in detail and the patient was informed of the following: Risks include a 1% risk of endometrial cancer for postmenopausal women treated for five years but no (or a minimally increased) risk in premenopausal women and that most women who develop tamoxifen-associated endometrial cancer can be cured. Any bleeding in a postmenopausal woman should be reported to a health care professional. There is also a 1% risk of blood clots (thromboembolism) that can be fatal. All patients irrespective of age who take tamoxifen and who have not had a hysterectomy should have a pelvic exam and Pap smear yearly. Tamoxifen increases the risk of cataract formation and on rare occasions has caused retinal damage: an eye exam is recommended yearly. Other risks include vaginal discharge or dryness, the development or worsening of hot flashes or vasomotor symptoms, and bone loss in premenopausal women. There is excellent evidence that tamoxifen does not increase risk of depression, cause weight gain or have a major effect on sexual function. Available data suggests little or no effect on cognitive function. Benefits include a lowering of cholesterol and a reduction in the rate of bone loss for postmenopausal woman. Any other symptoms should be reported. After this discussion, she is agreeable to lupron 3.75 mg q 4 weeks and anastrozole (started after her 2nd injection). She is interested in discussing bilateral oophorectomy, referred to gyn onc. She has seen Dr. Brennan Cooper, is considering    We will plan to see the patient in follow up at least once per cycle, or sooner if symptoms warrant.  Labs with each cycle to monitor for toxicity    She brought up [id number], Vaccine to prevent recurrence in patients with HER-2 positive breast cancer. She appears eligible. Closest site is [employer]) or [employer] . She will consider. I do not see a downside other than travel for this. Will [facility] for this. Port out next week    2. Emotional well being:  She has excellent support and is coping well with her disease; on zoloft 75 mg daily    3. Genetic testing:  discussed potential ramifications of a positive test including the potential need for bilateral mastectomies and bilateral oophorectomies and the risk then for her family members to also have a mutation. VUS also discussed. Tested in Dr. Gloria Byrne office on 5/21/20, negative    4. Loss of fertility:  Discussed potential loss of menses and fertility. She is not interested in having further children. Declines oncofertility consult    5. Decline in LV strain and HTN: 7.8% decline since initial TTE on 5/28/2020, started on Coreg 3.125 mg BID. Repeat TTE on 9/10/2020, EF 61%, stable, 12/2/2020, stable 60%, strain stable. 3/8/21 TTE EF 62%, strain improved and normal.  Previously increased to coreg 12.5 mg bid. TTE 6/2/2021 with EF 62%, no interval change. TTE ordered, but not done. 6. Thrombocytopenia:  Due to chemo. Will monitor. T-DM1 may cause thrombocytopenia    7. Joint pain:  Due to AI; improved    8. L side ROM issues:  Previously referred to PT. 9. HTN. Diagnosed during her 4th pregnancy. Metoprolol 50mg BID. Also on benicar 12.5-25 mg daily; improved at home        Signed By: Cheryl Kaufman NP      No orders of the defined types were placed in this encounter.

## 2021-12-22 NOTE — PROGRESS NOTES
Sharona Medina is a 42 y.o. female Follow up for the evaluation of breast cancer. 1. Have you been to the ER, urgent care clinic since your last visit? Hospitalized since your last visit? No    2. Have you seen or consulted any other health care providers outside of the [address] [place] [address] since your last visit? Include any pap smears or colon screening. No      Mariaa Dias is a 56 V. o. female here for office visit and Lupron injection.  Lupron injection in a sealed container from her local pharmacy. Lupron 3.75 mg given IM in left buttock without difficulty. Patient tolerated well.  Patient to schedule next injection for 28 days.         Patient[de-identified] Flonnie Buerger  [de-identified] 1980  Injection Site[de-identified] Left Buttock  Medication[de-identified] LupronDepot  Dosage[de-identified] 3.75 mg  Route[de-identified] Intramuscular  Expiration Date[de-identified] 2024  Lot Number[de-identified] [id number]  [address][de-identified] 7657-7955-16  RX #[de-identified] [id number]  Administered by[de-identified] Nilam Escobedo LPN

## 2021-12-22 NOTE — PATIENT INSTRUCTIONS
Zoledronic Acid (By injection)   Zoledronic Acid ([id number]-yi-FZLT-ik AS-id)  Treats high blood calcium levels. Also treats bone damage caused by Paget disease, multiple myeloma, and cancers that spread to the bone. Also treats osteoporosis and reduces the risk of hip fractures in certain patients. Brand Name(s): [facility] Rx Zoledronic Acid, Reclast, Zoledronic Acid Novaplus, Zometa   There may be other brand names for this medicine. When This Medicine Should Not Be Used: This medicine is not right for everyone. You should not receive it if you had an allergic reaction to zoledronic acid, or if you are pregnant. How to Use This Medicine:   Injectable  · A nurse or other health provider will give you this medicine. · Your doctor will prescribe your dose and schedule. This medicine is given through a needle placed in a vein. · Your doctor may tell you to drink extra liquids before your treatment to prevent kidney problems. · Your doctor may also give you vitamin D and calcium supplements. Tell your doctor if you are not able to take these medicines. · This medicine should come with a Medication Guide. Ask your pharmacist for a copy if you do not have one. · Missed dose: You must use this medicine on a fixed schedule. Call your doctor or pharmacist if you miss a dose. Drugs and Foods to Avoid:   Ask your doctor or pharmacist before using any other medicine, including over-the-counter medicines, vitamins, and herbal products. · Do not use other medicines that also contain zoledronic acid. Do not use zoledronic acid together with another bisphosphonate medicine. · Some foods and medicines can affect how zoledronic acid works. Tell your doctor if you are using digoxin, antibiotics, diuretics (water pills), an NSAID pain or arthritis medicine (such as aspirin, celecoxib, ibuprofen, naproxen), steroid medicines, or cancer medicines.   Warnings While Using This Medicine:   · It is not safe to take this medicine during pregnancy. It could harm an unborn baby. Tell your doctor right away if you become pregnant. · Tell your doctor if you are breastfeeding, or if you have kidney disease, anemia, aspirin-sensitive asthma, bleeding problems, cancer, congestive heart failure, low blood calcium levels, stomach absorption problems, mineral imbalance, dental problems or gum disease. Also tell your doctor if you had surgery on your bowel or parathyroid or thyroid gland. · This medicine may cause the following problems:  ¨ Jaw or teeth problems  ¨ Severe bone, joint, or muscle pain  ¨ Increased risk of thigh bone fracture  ¨ Low calcium levels in your blood  · You must have regular dental exams while you are being treated with this medicine. Tell your dentist or oral surgeon that you are using this medicine. · Your doctor will do lab tests at regular visits to check on the effects of this medicine. Keep all appointments.   Possible Side Effects While Using This Medicine:   Call your doctor right away if you notice any of these side effects:  · Allergic reaction: Itching or hives, swelling in your face or hands, swelling or tingling in your mouth or throat, chest tightness, trouble breathing  · Chest pain, trouble breathing, fast or uneven heartbeat  · Decrease in how much or how often you urinate, blood in the urine, lower back or side pain, burning or painful urination  · Muscle spasm or twitching, or numbness or tingling in your fingers, feet, or around your mouth  · Pain, swelling, or numbness in the mouth or jaw, loose teeth or other teeth problems  · Severe muscle, bone, or joint pain  · Unusual pain in your thigh, groin, or hip  If you notice these less serious side effects, talk with your doctor:   · Fever, chills, cough, sore throat, and body aches  · Headache  · Mild nausea, constipation, diarrhea, stomach pain or upset  · Redness, pain, or swelling of your skin where the needle is placed  If you notice other side effects that you think are caused by this medicine, tell your doctor. Call your doctor for medical advice about side effects. You may report side effects to FDA at 4-743-NVS-1094  © 2017 [facility] Information is for End User's use only and may not be sold, redistributed or otherwise used for commercial purposes. The above information is an  only. It is not intended as medical advice for individual conditions or treatments. Talk to your doctor, nurse or pharmacist before following any medical regimen to see if it is safe and effective for you.

## 2021-12-22 NOTE — PROGRESS NOTES
Cancer [place] at [facility]  [address], [address] [facility] [address] [facility] : [phone]  F: [phone]    Reason for Visit:   Vivian Villa is a 42 y.o. female who is seen for follow up of breast cancer    Breast surgeon:  Dr. Marko Rios surg:  Dr. Amberly Herron onc:  Dr. Alina Cruz    Treatment History:   · 20 left breast ax core bx: Adenocarcinoma, ER + at 86% ; HI + at 69%; HER 2 POSITIVE (IHC 2+, FISH ratio 3.2; sig/cell 6.08), ki67 29%  · 20 L breast core bx: Atypical 1 mm focus, highly suspicious for [facility], lobular features, the tumor does not histologically match the patient's prior axillary cancer, but could represent a small sample of the same tumor  · 20 L breast excisional bx: Subareolar lumpectomy, IDC, 1.6 cm, invasive tumor present at anterior and lateral margins, gr 2, + LVI, 1/1 LN involved, 1.1 cm, ER + at 48%, HI + at 72%, HER 2 POSITIVE (IHC 2+, FISH ratio 2, sig/cell 4.14)  · 20 Ronak Plana genetic testing:  negative  · [facility] 2020-2020  · 2020 Bilateral mastectomy: Right breast: benign. Left breast: Inflammation, no residual carcinoma, 2/6 LNs (micromets in both, 1.7 mm, and 1.1 mm). pT1c ypN1mi cM0  · T-DM1 20-21  · XRT 20-21   · Anastrozole 2021-21 (menses returned); 21-  · lupron 21-    History of Present Illness:   She noticed a L axilla mass in 2020, leading to the pathology above. Interval history:  Reports gr 2 hot flashes, 2/10 joint pain    S/p covid 19 vaccines    FH:   No breast, ovarian, prostate, or pancreas cancer    LMP 21    Past Medical History:   Diagnosis Date    Anxiety     Cancer [facility])     Essential hypertension     HX OTHER MEDICAL ,         Infertility     IVF with first pregnancy    Infertility, female     Joint pain     Psychiatric problem       Past Surgical History:   Procedure Laterality Date    HX BILATERAL MASTECTOMY      HX OTHER SURGICAL      [place] Teeth Removal    HX OTHER SURGICAL      1/2017 Excision of mole on toe with skin graph      Social History     Tobacco Use    Smoking status: Never Smoker    Smokeless tobacco: Never Used   Substance Use Topics    Alcohol use: Yes      Family History   Problem Relation Age of Onset    Diabetes Mother     Heart Disease Mother     Hypertension Mother     Hypertension Father     Cancer Paternal Grandmother         Lung cancer    Stroke Paternal Grandmother     Cancer Paternal Grandfather         Melanoma     Current Outpatient Medications   Medication Sig    Lupron Depot 3.75 mg injection RECONSTITUTE AS DIRECTED. INJECT 3.75 MG INTRAMUSCULARLY EVERY 4 WEEKS.  celecoxib (CELEBREX) 200 mg capsule     olmesartan-hydroCHLOROthiazide (BENICAR HCT) 20-12.5 mg per tablet Take 1 Tablet by mouth daily.  hydroCHLOROthiazide (HYDRODIURIL) 12.5 mg tablet Take 1 Tablet by mouth daily.  metoprolol tartrate (LOPRESSOR) 50 mg tablet Take 1 Tablet by mouth two (2) times a day.  loratadine (Claritin) 10 mg tablet Take 10 mg by mouth.  fluticasone propionate (FLONASE ALLERGY RELIEF NA) by Nasal route.  anastrozole (ARIMIDEX) 1 mg tablet Take 1 mg by mouth daily.  acetaminophen (TYLENOL PO) Take  by mouth.  lidocaine-prilocaine (EMLA) topical cream Apply  to affected area as needed for Pain.  ondansetron (ZOFRAN ODT) 8 mg disintegrating tablet Take 1 Tab by mouth every eight (8) hours as needed for Nausea or Vomiting. For 2 days following chemo    ibuprofen (MOTRIN) 600 mg tablet Take 1 Tab by mouth every six (6) hours as needed for Pain.  sertraline (ZOLOFT) 25 mg tablet Take 25 mg by mouth daily. No current facility-administered medications for this visit. No Known Allergies     Review of Systems: A complete review of systems was obtained, negative except as described above.     Physical Exam:     Visit Vitals  /80   Pulse (!) 57   Temp 98 °F (36.7 °C) (Temporal)   Resp 18   Ht 5' 2\" (1.575 m)   Wt 136 lb (61.7 kg)   SpO2 99%   BMI 24.87 kg/m²     ECOG PS: 0  General: no distress  Eyes: anicteric sclerae  HENT: oropharynx clear  Neck: supple  Lymphatic: no cervical, supraclavicular adenopathy  Respiratory: normal respiratory effort  CV: no peripheral edema  GI: nondistended  Skin: no rashes; no ecchymoses; no petechiae    Results:     Lab Results   Component Value Date/Time    WBC 5.0 09/07/2021 01:21 PM    HGB 12.4 09/07/2021 01:21 PM    HCT 36.3 09/07/2021 01:21 PM    PLATELET 725 (L) 53/62/8206 01:21 PM    MCV 87.1 09/07/2021 01:21 PM    ABS. NEUTROPHILS 3.0 09/07/2021 01:21 PM     Lab Results   Component Value Date/Time    Sodium 137 09/07/2021 01:21 PM    Potassium 3.3 (L) 09/07/2021 01:21 PM    Chloride 102 09/07/2021 01:21 PM    CO2 33 (H) 09/07/2021 01:21 PM    Glucose 90 09/07/2021 01:21 PM    BUN 13 09/07/2021 01:21 PM    Creatinine 0.67 09/07/2021 01:21 PM    GFR est AA >60 09/07/2021 01:21 PM    GFR est non-AA >60 09/07/2021 01:21 PM    Calcium 8.7 09/07/2021 01:21 PM     Lab Results   Component Value Date/Time    Bilirubin, total 0.5 09/07/2021 01:21 PM    ALT (SGPT) 32 09/07/2021 01:21 PM    Alk. phosphatase 77 09/07/2021 01:21 PM    Protein, total 7.8 09/07/2021 01:21 PM    Albumin 4.1 09/07/2021 01:21 PM    Globulin 3.7 09/07/2021 01:21 PM     5/20/20 breast  MRI  Subareolar region of L breast 1.2 cm mass, at least 2 LN on left, 3 cm largest  Right breast negative    5/20/20 bone scan  Heterogeneous activity in the ribs of uncertain significance. This would be an unusual presentation of bony metastatic disease    5/20/20 CT c/a/p  negative    12/26/20 estradiol 8.6  FSH 95  LH 38.5    5/4/21 estradiol 441  FSH 8  LH 7.8    Records reviewed and summarized above.       Assessment/plan:   1.  L breast cancer, LN + by core, ER +, AL +, HER 2 POSITIVE, cT1c cN1a cM0:  Stage IIA, prognostic stage IB  premenopausal    We explained to the patient that the goal of systemic adjuvant therapy is to improve the chances for cure and decrease the risk of relapse. We explained why a patient can have microscopic cancer spread now even though physical examination, laboratory studies and imaging studies are negative for cancer. We explained that the same treatments used now as adjuvant or preventive treatments rarely if ever are curative in women who develop metastases. TTE on 5/28/2020 EF 64%, TTE on 8/10/2020, EF 60%, TTE on 9/11/2020, EF 61%, overall stable, TTE on 12/2/2020, EF 60%, GLS stable. 3/8/21 TTE EF 62%, GLS normal; 6/2/21 TTE stable without change. 9/15/21 TTE EF 61%    Bilateral mastectomies on 11/12/2020, no residual carcinoma in breast, 2/6 LNs (micromets). Discussed neratinib 240 mg daily with food for one year after the completion of herceptin. Discussed that there was only a 2% DFS benefit and that benefit was largely driven in the ER + population. Discussed that there are no data in its use post-T-DM1. She and I decline this at this time     Discussed common side effects of neratinib including but not limited to diarrhea, nausea, abdominal pain, fatigue, vomiting, rash, swollen and sore mouth (stomatitis), decreased appetite, muscle spasms, indigestion (dyspepsia), liver damage (AST or ALT enzyme increase), nail disorder, dry skin, abdominal swelling (distention), weight loss and urinary tract infection. She is premenopausal, her cycles have returned    We discussed that for high risk women with breast cancer (having either received chemotherapy or < 28years old), ovarian suppression(such as monthly lupron 3.75 mg IM) + AI was superior in terms of overall survival than tamoxifen alone. The added risks of worse QOL due to depression and worse menopausal symptoms were discussed with the addition of ovarian suppression.     The risks and benefits of tamoxifen were discussed in detail and the patient was informed of the following: Risks include a 1% risk of endometrial cancer for postmenopausal women treated for five years but no (or a minimally increased) risk in premenopausal women and that most women who develop tamoxifen-associated endometrial cancer can be cured. Any bleeding in a postmenopausal woman should be reported to a health care professional. There is also a 1% risk of blood clots (thromboembolism) that can be fatal. All patients irrespective of age who take tamoxifen and who have not had a hysterectomy should have a pelvic exam and Pap smear yearly. Tamoxifen increases the risk of cataract formation and on rare occasions has caused retinal damage: an eye exam is recommended yearly. Other risks include vaginal discharge or dryness, the development or worsening of hot flashes or vasomotor symptoms, and bone loss in premenopausal women. There is excellent evidence that tamoxifen does not increase risk of depression, cause weight gain or have a major effect on sexual function. Available data suggests little or no effect on cognitive function. Benefits include a lowering of cholesterol and a reduction in the rate of bone loss for postmenopausal woman. Any other symptoms should be reported. After this discussion, she is agreeable to lupron 3.75 mg q 4 weeks and anastrozole (started after her 2nd injection). She is interested in discussing bilateral oophorectomy, referred to gyn onc. She has seen Dr. Rahul Hernandez, is considering. Dr. Sammi Pugh may also be able to do this for her    We will plan to see the patient in follow up at least once per cycle, or sooner if symptoms warrant. Labs with each cycle to monitor for toxicity    She brought up [id number], Vaccine to prevent recurrence in patients with HER-2 positive breast cancer. She is not eligible for this. 2. Emotional well being:  She has excellent support and is coping well with her disease; on zoloft 75 mg daily    3.  Genetic testing:  discussed potential ramifications of a positive test including the potential need for bilateral mastectomies and bilateral oophorectomies and the risk then for her family members to also have a mutation. VUS also discussed. Tested in Dr. Kirstin Najera office on 5/21/20, negative    4. Loss of fertility:  Discussed potential loss of menses and fertility. She is not interested in having further children. Declines oncofertility consult    5. Decline in LV strain and HTN: 7.8% decline since initial TTE on 5/28/2020, started on Coreg 3.125 mg BID. Repeat TTE on 9/10/2020, EF 61%, stable, 12/2/2020, stable 60%, strain stable. 3/8/21 TTE EF 62%, strain improved and normal.  Previously increased to coreg 12.5 mg bid. TTE 6/2/2021 with EF 62%, no interval change. TTE 9/15/21 stable    6. Thrombocytopenia:  Due to chemo. Will monitor. T-DM1 may cause thrombocytopenia    7. Joint pain:  Due to AI; improved; is taking omega 3 FA; discussed tart cherry juice    8. L side ROM issues:  Previously referred to PT. 9. HTN. Diagnosed during her 4th pregnancy. Metoprolol 50mg BID. Also on benicar 12.5-25 mg daily; improved at home    10. ast elevated:  Likely due to T-DM1, mild, monitoring    11. Hot flashes:  Will add oxybutynin 10 mg XL    12. Osteopenia: dexa 1/2021; We discussed the data from [id number] [place] [address], [address] [facility] 2013, for the meta-analysis for adjuvant bisphosphonate use in breast cancer. We discussed that in postmenopausal women, it appears that the bisphosphate zolendronic acid, given as in ABCSG 12 at 4 mg IV q 6 months x 3 years, is beneficial in improving bone DFS and OS. We discussed side effects such as ONJ and the need to avoid invasive dental procedures while on these medications, renal damage, and hypocalcemia. She is agreeable to this, will start in January        Signed By: Jessica Kelly MD      No orders of the defined types were placed in this encounter.

## 2021-12-22 NOTE — ADDENDUM NOTE
Addended by: Kimberlee Chaudhary on: 12/22/2021 10:24 AM     Modules accepted: Orders, Level of Service

## 2022-01-02 RX ORDER — METOPROLOL TARTRATE 50 MG/1
TABLET ORAL
Qty: 180 TABLET | Refills: 1 | Status: SHIPPED | OUTPATIENT
Start: 2022-01-02 | End: 2022-07-15

## 2022-01-19 ENCOUNTER — TELEPHONE (OUTPATIENT)
Dept: ONCOLOGY | Age: 42
End: 2022-01-19

## 2022-01-20 ENCOUNTER — OFFICE VISIT (OUTPATIENT)
Dept: ONCOLOGY | Age: 42
End: 2022-01-20

## 2022-01-20 ENCOUNTER — HOSPITAL ENCOUNTER (OUTPATIENT)
Dept: INFUSION THERAPY | Age: 42
Discharge: HOME | End: 2022-01-20

## 2022-01-20 VITALS
RESPIRATION RATE: 18 BRPM | OXYGEN SATURATION: 99 % | HEART RATE: 58 BPM | BODY MASS INDEX: 25.4 KG/M2 | DIASTOLIC BLOOD PRESSURE: 80 MMHG | HEIGHT: 62 IN | SYSTOLIC BLOOD PRESSURE: 131 MMHG | TEMPERATURE: 98.3 F | WEIGHT: 138 LBS

## 2022-01-20 DIAGNOSIS — C50.912 STAGE II BREAST CANCER, LEFT (HCC): ICD-10-CM

## 2022-01-20 NOTE — PROGRESS NOTES
Parvez Solomon a 48 A. o. female here for office visit and Lupron injection.  Lupron injection in a sealed container from her local pharmacy. Lupron 3.75 mg given IM in left buttock without difficulty. Patient tolerated well.  Patient to schedule next injection for 28 days. 1. Have you been to the ER, urgent care clinic since your last visit? Hospitalized since your last visit? No    2. Have you seen or consulted any other health care providers outside of the [address] [place] [address] since your last visit? Include any pap smears or colon screening. No    Patient[de-identified] Dulce ROSSI[de-identified] 1980  Medication[de-identified] LupronDepot  Dosage[de-identified] 3.75 mg  Route[de-identified] Intramuscular  Site[de-identified] Left Buttock  Lot Number[de-identified] [id number]  Expiration Date[de-identified] 2024  [facility][de-identified] [id number]  SN[de-identified] [id number]  Administered by[de-identified] Dejuan Saenz LPN.

## 2022-02-09 DIAGNOSIS — C50.112 MALIGNANT NEOPLASM OF CENTRAL PORTION OF LEFT BREAST IN FEMALE, ESTROGEN RECEPTOR POSITIVE (HCC): ICD-10-CM

## 2022-02-09 DIAGNOSIS — Z17.0 MALIGNANT NEOPLASM OF CENTRAL PORTION OF LEFT BREAST IN FEMALE, ESTROGEN RECEPTOR POSITIVE (HCC): ICD-10-CM

## 2022-02-10 RX ORDER — ANASTROZOLE 1 MG/1
TABLET ORAL
Qty: 90 TABLET | Refills: 3 | Status: SHIPPED | OUTPATIENT
Start: 2022-02-10

## 2022-02-17 ENCOUNTER — OFFICE VISIT (OUTPATIENT)
Dept: ONCOLOGY | Age: 42
End: 2022-02-17

## 2022-02-17 ENCOUNTER — HOSPITAL ENCOUNTER (OUTPATIENT)
Dept: INFUSION THERAPY | Age: 42
Discharge: HOME | End: 2022-02-17
Payer: COMMERCIAL

## 2022-02-17 VITALS
RESPIRATION RATE: 16 BRPM | TEMPERATURE: 98.1 F | HEART RATE: 55 BPM | BODY MASS INDEX: 25.42 KG/M2 | WEIGHT: 139 LBS | SYSTOLIC BLOOD PRESSURE: 127 MMHG | DIASTOLIC BLOOD PRESSURE: 81 MMHG

## 2022-02-17 VITALS
SYSTOLIC BLOOD PRESSURE: 129 MMHG | BODY MASS INDEX: 25.58 KG/M2 | RESPIRATION RATE: 18 BRPM | TEMPERATURE: 98 F | WEIGHT: 139 LBS | OXYGEN SATURATION: 98 % | HEIGHT: 62 IN | DIASTOLIC BLOOD PRESSURE: 71 MMHG | HEART RATE: 60 BPM

## 2022-02-17 DIAGNOSIS — M85.89 OSTEOPENIA OF MULTIPLE SITES: Primary | ICD-10-CM

## 2022-02-17 DIAGNOSIS — C50.912 STAGE II BREAST CANCER, LEFT (HCC): ICD-10-CM

## 2022-02-17 LAB
ALBUMIN SERPL-MCNC: 4.2 G/DL (ref 3.5–5)
ALBUMIN/GLOB SERPL: 1.1 {RATIO} (ref 1.1–2.2)
ALP SERPL-CCNC: 81 U/L (ref 45–117)
ALT SERPL-CCNC: 30 U/L (ref 12–78)
ANION GAP BLD CALC-SCNC: 7 MMOL/L (ref 10–20)
ANION GAP SERPL CALC-SCNC: 3 MMOL/L (ref 5–15)
AST SERPL-CCNC: ABNORMAL U/L (ref 15–37)
BILIRUB SERPL-MCNC: 0.8 MG/DL (ref 0.2–1)
BUN SERPL-MCNC: 20 MG/DL (ref 6–20)
BUN/CREAT SERPL: 25 (ref 12–20)
CA-I BLD-MCNC: 1.1 MMOL/L (ref 1.12–1.32)
CALCIUM SERPL-MCNC: 9.3 MG/DL (ref 8.5–10.1)
CHLORIDE BLD-SCNC: 104 MMOL/L (ref 98–107)
CHLORIDE SERPL-SCNC: 100 MMOL/L (ref 97–108)
CO2 BLD-SCNC: 31.8 MMOL/L (ref 21–32)
CO2 SERPL-SCNC: 33 MMOL/L (ref 21–32)
COMMENT, HOLDF: NORMAL
CREAT BLD-MCNC: 0.68 MG/DL (ref 0.6–1.3)
CREAT SERPL-MCNC: 0.81 MG/DL (ref 0.55–1.02)
GLOBULIN SER CALC-MCNC: 3.7 G/DL (ref 2–4)
GLUCOSE BLD-MCNC: 88 MG/DL (ref 65–100)
GLUCOSE SERPL-MCNC: 87 MG/DL (ref 65–100)
POTASSIUM BLD-SCNC: 3.5 MMOL/L (ref 3.5–5.1)
POTASSIUM SERPL-SCNC: ABNORMAL MMOL/L (ref 3.5–5.1)
PROT SERPL-MCNC: 7.9 G/DL (ref 6.4–8.2)
SAMPLES BEING HELD,HOLD: NORMAL
SERVICE CMNT-IMP: ABNORMAL
SODIUM BLD-SCNC: 142 MMOL/L (ref 136–145)
SODIUM SERPL-SCNC: 136 MMOL/L (ref 136–145)

## 2022-02-17 PROCEDURE — 80047 BASIC METABLC PNL IONIZED CA: CPT

## 2022-02-17 PROCEDURE — 80053 COMPREHEN METABOLIC PANEL: CPT

## 2022-02-17 PROCEDURE — 36415 COLL VENOUS BLD VENIPUNCTURE: CPT

## 2022-02-17 PROCEDURE — 74011250636 HC RX REV CODE- 250/636: Performed by: NURSE PRACTITIONER

## 2022-02-17 PROCEDURE — 96374 THER/PROPH/DIAG INJ IV PUSH: CPT

## 2022-02-17 RX ORDER — SODIUM CHLORIDE 9 MG/ML
25 INJECTION, SOLUTION INTRAVENOUS CONTINUOUS
Status: DISCONTINUED | OUTPATIENT
Start: 2022-02-17 | End: 2022-02-18 | Stop reason: HOSPADM

## 2022-02-17 RX ORDER — SODIUM CHLORIDE 9 MG/ML
10 INJECTION INTRAMUSCULAR; INTRAVENOUS; SUBCUTANEOUS AS NEEDED
Status: DISCONTINUED | OUTPATIENT
Start: 2022-02-17 | End: 2022-02-18 | Stop reason: HOSPADM

## 2022-02-17 RX ORDER — HEPARIN 100 UNIT/ML
300-500 SYRINGE INTRAVENOUS AS NEEDED
Status: DISCONTINUED | OUTPATIENT
Start: 2022-02-17 | End: 2022-02-18 | Stop reason: HOSPADM

## 2022-02-17 RX ORDER — SODIUM CHLORIDE 0.9 % (FLUSH) 0.9 %
10 SYRINGE (ML) INJECTION AS NEEDED
Status: DISCONTINUED | OUTPATIENT
Start: 2022-02-17 | End: 2022-02-18 | Stop reason: HOSPADM

## 2022-02-17 RX ADMIN — SODIUM CHLORIDE 25 ML/HR: 9 INJECTION, SOLUTION INTRAVENOUS at 15:08

## 2022-02-17 RX ADMIN — ZOLEDRONIC ACID 4 MG: 0.04 INJECTION, SOLUTION INTRAVENOUS at 15:09

## 2022-02-17 NOTE — PROGRESS NOTES
Woodrow Chapa a 89 X. o. female here for office visit and Lupron injection.  Lupron injection in a sealed container from her local pharmacy. Lupron 3.75 mg given IM in right buttock without difficulty. Patient tolerated well.  Patient to schedule next injection for 28 days. 1. Have you been to the ER, urgent care clinic since your last visit? Hospitalized since your last visit? No    2. Have you seen or consulted any other health care providers outside of the [address] [place] [address] since your last visit? Include any pap smears or colon screening. No    Patient[de-identified] Kam Tam DOB[de-identified] 1980  Medication[de-identified] LupronDepot  Dosage[de-identified] 3.75 mg  Route[de-identified] Intramuscular  Site[de-identified] Right Buttock  RX #[de-identified] [id number]  [address][de-identified] [id number]  Lot Number[de-identified] [id number]  Expiration Date[de-identified] 2024  Administered by[de-identified] Jeanne Dixon LPN.

## 2022-02-17 NOTE — PROGRESS NOTES
[facility]patient [facility]fusion [facility] Short Visit Progress Note    Patient admitted to [facility] for zometa ambulatory in stable condition. Assessment completed. No new concerns voiced. I stat complete Ca reviewed and noted to be low. Sent to hospital lab for processing. Results came back WNL. Covid Screening      1. Do you have any symptoms of COVID-19? SOB, coughing, fever, or generally not feeling well ? no  2. Have you been exposed to COVID-19 recently? no  3. Have you had any recent contact with family/friend that has a pending COVID test? no    Vital Signs:  Visit Vitals  /81   Pulse (!) 55   Temp 98.1 °F (36.7 °C)   Resp 16   Wt 63 kg (139 lb)   Breastfeeding No   BMI 25.42 kg/m²         R arm PIV with positive blood return. Lab Results:  Recent Results (from the past 12 hour(s))   POC CHEM8    Collection Time: 02/17/22  1:30 PM   Result Value Ref Range    Calcium, ionized (POC) 1.10 (L) 1.12 - 1.32 mmol/L    Sodium (POC) 142 136 - 145 mmol/L    Potassium (POC) 3.5 3.5 - 5.1 mmol/L    Chloride (POC) 104 98 - 107 mmol/L    CO2 (POC) 31.8 21 - 32 mmol/L    Anion gap (POC) 7 (L) 10 - 20 mmol/L    Glucose (POC) 88 65 - 100 mg/dL    Creatinine (POC) 0.68 0.6 - 1.3 mg/dL    GFRAA, POC >60 >60 ml/min/1.73m2    GFRNA, POC >60 >60 ml/min/1.73m2    Comment Comment Not Indicated. METABOLIC PANEL, COMPREHENSIVE    Collection Time: 02/17/22  1:34 PM   Result Value Ref Range    Sodium 136 136 - 145 mmol/L    Potassium HEMOLYZED,RECOLLECT REQUESTED 3.5 - 5.1 mmol/L    Chloride 100 97 - 108 mmol/L    CO2 33 (H) 21 - 32 mmol/L    Anion gap 3 (L) 5 - 15 mmol/L    Glucose 87 65 - 100 mg/dL    BUN 20 6 - 20 MG/DL    Creatinine 0.81 0.55 - 1.02 MG/DL    BUN/Creatinine ratio 25 (H) 12 - 20      GFR est AA >60 >60 ml/min/1.73m2    GFR est non-AA >60 >60 ml/min/1.73m2    Calcium 9.3 8.5 - 10.1 MG/DL    Bilirubin, total 0.8 0.2 - 1.0 MG/DL    ALT (SGPT) 30 12 - 78 U/L    AST (SGOT) HEMOLYZED,RECOLLECT REQUESTED 15 - 37 U/L    Alk. phosphatase 81 45 - 117 U/L    Protein, total 7.9 6.4 - 8.2 g/dL    Albumin 4.2 3.5 - 5.0 g/dL    Globulin 3.7 2.0 - 4.0 g/dL    A-G Ratio 1.1 1.1 - 2.2     SAMPLES BEING HELD    Collection Time: 02/17/22  1:34 PM   Result Value Ref Range    SAMPLES BEING HELD D GREEN     COMMENT        Add-on orders for these samples will be processed based on acceptable specimen integrity and analyte stability, which may vary by analyte. Medications:  Medications Administered     0.9% sodium chloride infusion     Admin Date  02/17/2022 Action  New Bag Dose  25 mL/hr Rate  25 mL/hr Route  IntraVENous Administered By  Floridalma Gonzalez RN          zoledronic acid (ZOMETA) 4 mg/100mL infusion     Admin Date  02/17/2022 Action  New Bag Dose  4 mg Rate  400 mL/hr Route  IntraVENous Administered By  Floridalma Gonzalez RN              Patient tolerated treatment well. Patient discharged from [place] [address] ambulatory in no distress . Patient aware of next appointment.     Analisa Page RN    Future Appointments   Date Time Provider Han Ann   3/17/2022  2:00 PM Collins Jnug MD [facility] AMB   3/28/2022  2:15 PM Hollis Cabrales NP [facility] AMB   7/21/2022  2:00 PM SS INF7 CH2 <1H [facility] [address] [facility]

## 2022-02-24 NOTE — PROGRESS NOTES
2022       Hilda Crews MD  [address]  [facility] [address]  Via In Basket      Patient: Katie Chapa   YOB: 1956   Date of Visit: 2022       Dear Dr. Crews:    Thank you for referring Katie Chapa to me for evaluation. Below are my notes for this visit with her.    If you have questions, please do not hesitate to call me. I look forward to following your patient along with you.      Sincerely,        James Sears DO        CC: No Recipients  James Sears DO  2022  8:58 PM  Signed  Cardiology Follow Up Note  [address] [address]     Referred by:   Hilda Crews MD  [address] [place] [address]     Primary care physician: Hilda Crews MD     Date of Service: 2022       Consultation for Katie Chapa  : 1956     Subjective:   Katie Chapa is a 65 year old female who  has a past medical history of Congestive cardiac failure (CMS/[facility]), HLD, HTN, Obesity, and Rheumatoid arthritis involving multiple sites (CMS/[facility]).  Is here for follow-up today.    Last visit with me was 2021.  Ordered stress test.  Unfortunately during stress test had significant hypotension and chest pain.  Found to have significant anemia.  Hospitalized had colonoscopy.  Found to have a tubular adenoma.  3 units of blood.  Awaiting capsule study.    Patient is doing well.  Today  No chest pain.  No shortness of breath.  No lower extremity swelling.  No dizziness.  No palpitations.  No syncopal episodes.    She is compliant with her enalapril.  Blood pressure at home is fair.  Blood pressure today is also fair.  Takes both enalapril and amlodipine.  On statin.  No side effects.    I explained to her that I suspect her reaction during stress test with purely related to significant anemia.  We reviewed her hemoglobins.  She is currently not taking aspirin.   OUTPATIENT INFUSION CENTER    DISCHARGE INSTRUCTIONS FOR:  CHEMOTHERAPY / BIOTHERAPY    Chemotherapy has the potential to cause many side effects. The following are general precautions that chemo patients should take:    1. Practice good hand washing:   * Use soap and water for at least 15 seconds, covering all areas of hands. * Always wash hands before eating. * Wash hands after contact with public surfaces such as door knobs and         handles, shopping carts, telephones and elevator buttons. 2. Get plenty of rest:    * You will likely experience fatigue three to five days following your treatment. It may last as long as seven days. 3. Drink plenty of fluids. Water is best.    4. Eat a well balanced diet:  * Small frequent meals may help if you are having trouble with nausea or your  appetite. Some people also do well with nutritional supplements. 5. Pace yourself with daily activities:   * Take frequent breaks and ask for help if you need it. 6. Exercise is very important:  * It will increase circulation and will help the fatigue. Do what you can each day. 7. If your regimen results in hair loss:  *  You will likely notice effects between two and three weeks following your first treatment. Some lose all hair while others only experience thinning. 8. Practice good oral hygiene:   *  Notify your M.D. immediately if any mouth sores or discomfort develop. 9. Protect yourself from the sun. Signs/Symptoms of an allergic reaction and/or some side effects may require immediate medical attention. Notify your physician if you develop one or more of the following:     Temperature of 100.5 degrees or greater;   Skin redness, itching, swelling, blistering, weeping, crusting, rash, or hives;    Wheezing, chest tightness, cough, or shortness of breath;   Swelling of the face, eyelids, lips, tongue, or throat;  Severe, persistent headache;  Stuffy nose, runny nose, sneezing;   Red (bloodshot), This was held.  Which I think is fair.    Stress test appears to have diaphragmatic attenuation artifact.  And symptoms have improved significantly with iron infusions.    Her triamterene hydrochlorothiazide was stopped.    Review of Systems   Review of Systems   Constitutional: Negative for fatigue and fever.   HENT: Negative.    Eyes: Negative.    Respiratory: Negative for chest tightness and shortness of breath.    Cardiovascular: Negative for chest pain, palpitations and leg swelling.   Gastrointestinal: Negative.  Negative for blood in stool.   Endocrine: Negative.    Genitourinary: Negative.  Negative for hematuria.   Musculoskeletal: Negative.    Skin: Negative.    Allergic/Immunologic: Negative.    Neurological: Negative.  Negative for dizziness, syncope and light-headedness.   Hematological: Negative.    Psychiatric/Behavioral: Negative.          Past Medical History:   Diagnosis Date   • Congestive cardiac failure (CMS/HCC)    • HLD    • HTN    • Obesity    • Rheumatoid arthritis involving multiple sites (CMS/HCC)     Dx 5 yrs ago per patient        No past surgical history on file.     Family History   Problem Relation Age of Onset   • Patient is unaware of any medical problems Mother    • Hypertension Father    • Other Father         arthritis per patient   • Diabetes Sister    • Patient is unaware of any medical problems Sister    • Patient is unaware of any medical problems Sister    • Patient is unaware of any medical problems Sister    • Patient is unaware of any medical problems Sister    • Diabetes Brother    • Patient is unaware of any medical problems Daughter    • Patient is unaware of any medical problems Daughter    • Patient is unaware of any medical problems Daughter    • Patient is unaware of any medical problems Daughter    • Patient is unaware of any medical problems Son    • Patient is unaware of any medical problems Son         Social History     Tobacco Use   • Smoking status: Never  itchy, swollen, or watery eyes;   Stomach  pain, nausea, vomiting, diarrhea, or bloody stools;  Mouth sores        Your physician should also be aware of the following symptoms:    Persistent and unresolved nausea and/or vomiting;   Persistent and unresolved diarrhea or constipation;   Numbness/tingling/burning of the extremities, including the fingers and toes; Bleeding or unexplained bruising; Unexplained redness/swelling/pain in the arms or legs; Shortness of breath or fatigue that worsens;   Pain with urination or blood in the urine; Chills;  Cough, especially a productive cough;  Mouth sores or a white coating of the tongue; Redness, swelling, pain or drainage at the port-a-cath or IV site; Increased feeling of bloating or water retention; Excessive weight loss or gain;  Ringing in the ears; Difficulty swallowing;  Dizziness, vertigo, lightheadedness or fainting.           Joel Fruit Signature: ____________________________ 6/1/2020  Adis Canas Smoker   • Smokeless tobacco: Never Used   Vaping Use   • Vaping Use: never used   Substance Use Topics   • Alcohol use: Never   • Drug use: Never        ALLERGIES:  No Known Allergies  Current Outpatient Medications   Medication Sig   • docusate sodium (DOK) 100 MG capsule Take 1 capsule by mouth 2 times daily as needed for Constipation.   • pantoprazole (PROTONIX) 40 MG tablet Take 1 tablet by mouth 2 times daily.   • hydrOXYzine (ATARAX) 25 MG tablet Take 1 tablet by mouth at bedtime as needed for Anxiety. Para ansiedad   • amLODIPine (NORVASC) 2.5 MG tablet Take 1 tablet by mouth daily.   • escitalopram (Lexapro) 5 MG tablet Take 1 tablet by mouth daily.   • ALPRAZolam (XANAX) 0.25 MG tablet Take 1 tablet by mouth nightly as needed for Anxiety.   • atorvastatin (LIPITOR) 40 MG tablet Take 1 tablet by mouth daily.   • montelukast (SINGULAIR) 10 MG tablet EVERARDO FAREED TABLETA AL ACOSTARSE   • enalapril (VASOTEC) 20 MG tablet Take 20 mg by mouth daily.     No current facility-administered medications for this visit.        Objective:     Physical Exam:   Physical Exam  Constitutional:       Appearance: She is well-developed. She is obese. She is not ill-appearing.   HENT:      Head: Normocephalic and atraumatic.   Eyes:      General: No scleral icterus.  Neck:      Vascular: No carotid bruit or JVD.   Cardiovascular:      Rate and Rhythm: Normal rate and regular rhythm.      Pulses: Intact distal pulses.      Heart sounds: Normal heart sounds. No murmur heard.    No friction rub. No gallop.   Pulmonary:      Effort: Pulmonary effort is normal. No respiratory distress.      Breath sounds: Normal breath sounds. No wheezing or rales.   Chest:      Chest wall: No tenderness.   Abdominal:      General: Bowel sounds are normal. There is no distension.      Palpations: Abdomen is soft.   Musculoskeletal:         General: Swelling present.   Skin:     General: Skin is warm and dry.      Coloration: Skin is not pale.       Findings: No erythema or rash.   Neurological:      Mental Status: She is alert and oriented to person, place, and time.   Psychiatric:         Behavior: Behavior normal.         Thought Content: Thought content normal.         Judgment: Judgment normal.       Visit Vitals  /55 (BP Location: LUE - Left upper extremity, Patient Position: Sitting, Cuff Size: Large Adult)   Pulse 63   Ht 4' 11\" (1.499 m)   Wt 87.5 kg (193 lb)   LMP 12/20/2021   SpO2 100%   BMI 38.98 kg/m²     Wt Readings from Last 4 Encounters:   02/24/22 87.5 kg (193 lb)   02/23/22 89.1 kg (196 lb 6.9 oz)   02/22/22 89.5 kg (197 lb 6.8 oz)   02/16/22 89.3 kg (196 lb 12.2 oz)        Labs:  Cholesterol (mg/dL)   Date Value   10/04/2021 149     HDL (mg/dL)   Date Value   10/04/2021 36 (L)     Cholesterol/ HDL Ratio (no units)   Date Value   10/04/2021 4.1     Triglycerides (mg/dL)   Date Value   10/04/2021 130     LDL (mg/dL)   Date Value   10/04/2021 87        Hemoglobin A1C (%)   Date Value   10/04/2021 5.4        WBC (K/mcL)   Date Value   02/22/2022 4.7     RBC (mil/mcL)   Date Value   02/22/2022 2.82 (L)     HCT (%)   Date Value   02/22/2022 25.2 (L)     HGB (g/dL)   Date Value   02/22/2022 7.9 (L)     PLT (K/mcL)   Date Value   02/22/2022 262        Sodium (mmol/L)   Date Value   01/26/2022 135     Potassium (mmol/L)   Date Value   01/26/2022 3.8     Chloride (mmol/L)   Date Value   01/26/2022 102     Glucose (mg/dL)   Date Value   01/26/2022 104 (H)     Calcium (mg/dL)   Date Value   01/26/2022 8.6     Carbon Dioxide (mmol/L)   Date Value   01/26/2022 25     BUN (mg/dL)   Date Value   01/26/2022 14     Creatinine (mg/dL)   Date Value   01/26/2022 0.66        GOT/AST (Units/L)   Date Value   01/26/2022 18     GPT/ALT (Units/L)   Date Value   01/26/2022 17     No results found for: GGTP  Alkaline Phosphatase (Units/L)   Date Value   01/26/2022 90     Bilirubin, Total (mg/dL)   Date Value   01/26/2022 0.2        Imaging:  TTE 9/27/21: LVEF 60%,  G2DD, mild MR, normal IVC  --Echocardiogram personally reviewed.  There is also moderate mitral annular calcification with mild mitral stenosis.  Moderately dilated left atrial enlargement.  And mild pulmonary hypertension with RVSP of 40 mmHg.   CT PE 9/26/21: mild coronary calcifications  1/5/2022, lexiscan EF 64%, moderate sized mild defect in the basal inferoseptal segment.  No reversibility.  Likely diaphragmatic attenuation artifact given normal wall motion.  EKG nondiagnostic.  Sent to ER.  Hemoglobin at the time of ER evaluation was 5.9  1/6/2022, TTE, EF 60%, diastolic function not ratable secondary to mitral annular calcification.  Report erroneous.  Mild to moderate mitral valve stenosis.  Marked mitral annular calcification.  Mild to moderate regurgitation.  Mild TR.        Impression/Report/Plan:  Ms. Ozzy Chapa is a 65 year old female with past medical history significant for  has a past medical history of Congestive cardiac failure (CMS/[facility]), HLD, HTN, Obesity, and Rheumatoid arthritis involving multiple sites (CMS/[facility]). and is here for follow up.     # CAD: coronary artery calcifications noted on prior CT scans.  Stress test with diaphragmatic attenuation artifact.  Did have acute shortness of breath.  However was sent to the ER and diagnosed with significant anemia 5.9.  Needed 3 units of blood.  Likely explains her symptoms.  All of her symptoms have resolved with iron infusions.  And blood transfusions.  --Aspirin being held.  Which is reasonable.    # chronic diastolic congestive heart failure: Diastolic dysfunction unreadable secondary to significant mitral annular calcification.  BMP improved.  Symptoms of shortness of breath likely related to her significant anemia.  Which is improved significantly.  Getting iron infusions.  The symptoms stable.  No longer requiring diuretic    # Hypertension: on enalapril 20mg amlodipine 2.5 mg.  Well-controlled.  Has been on triamterene  hydrochlorothiazide but this made her feel unwell.    # Hyperlipidemia: Seems to be controlled.  Lipitor 40 mg/day.    #Mitral valve stenosis/, mitral valve regurgitation/pulmonary hypertension: Personally reviewed echocardiogram.  Mitral annular calcification is present.  And leading to mild mitral stenosis.  Unlikely symptomatic from this.  Does have likely at least mild MR.  Somewhat eccentric.  Difficult to quantitate.  Left atrial large man is noted.  And mild PA pressures are elevated at 40.  Continue blood pressure control.  Repeat echo in about 1 year    #Iron deficiency anemia: Getting iron infusions.  Work-up thus far unrevealing.  And a capsule study.  Holding aspirin for now.    Diagnoses Today:   1. Chronic heart failure with preserved ejection fraction (CMS/HCC)    2. Coronary artery calcification seen on CAT scan    3. Essential (primary) hypertension    4. Mixed hyperlipidemia    5. Nonrheumatic mitral valve regurgitation    6. Nonrheumatic mitral valve stenosis    7. Pulmonary hypertension (CMS/HCC)    8. Iron deficiency anemia, unspecified iron deficiency anemia type         Recommendations:  1.  Continue current blood pressure regimen as is.  Monitor salt intake.  2.  I feel much of her shortness of breath present on original consultation was related to anemia.  If virtually all her symptoms have resolved with iron infusions.  3.  Restart aspirin when hemoglobin is stabilized.  4.  Follow-up with me in 6 months.  No further testing at this time with regards to her likely diaphragmatic attenuation artifact on stress test.  Repeat echocardiogram in 1 year for her mitral valve disease.  5.  Copy PCP    40 minutes in total spent in care of patient today.  Precharting, review of recent echocardiogram, stress test images, EKG, hospitalization, completion of note.  No orders of the defined types were placed in this encounter.

## 2022-02-25 ENCOUNTER — APPOINTMENT (OUTPATIENT)
Dept: INFUSION THERAPY | Age: 42
End: 2022-02-25

## 2022-03-15 ENCOUNTER — ANCILLARY PROCEDURE (OUTPATIENT)
Dept: CARDIOLOGY CLINIC | Age: 42
End: 2022-03-15
Payer: COMMERCIAL

## 2022-03-15 VITALS
HEIGHT: 62 IN | BODY MASS INDEX: 25.58 KG/M2 | WEIGHT: 139 LBS | DIASTOLIC BLOOD PRESSURE: 78 MMHG | SYSTOLIC BLOOD PRESSURE: 118 MMHG

## 2022-03-15 DIAGNOSIS — Z51.81 ENCOUNTER FOR MONITORING CARDIOTOXIC DRUG THERAPY: ICD-10-CM

## 2022-03-15 DIAGNOSIS — Z79.899 ENCOUNTER FOR MONITORING CARDIOTOXIC DRUG THERAPY: ICD-10-CM

## 2022-03-15 DIAGNOSIS — C50.912 STAGE II BREAST CANCER, LEFT (HCC): ICD-10-CM

## 2022-03-15 LAB
ECHO AO ROOT DIAM: 3 CM
ECHO AO ROOT INDEX: 1.83 CM/M2
ECHO AV AREA PEAK VELOCITY: 2.6 CM2
ECHO AV AREA VTI: 2.7 CM2
ECHO AV AREA/BSA PEAK VELOCITY: 1.6 CM2/M2
ECHO AV AREA/BSA VTI: 1.6 CM2/M2
ECHO AV MEAN GRADIENT: 3 MMHG
ECHO AV MEAN VELOCITY: 0.8 M/S
ECHO AV PEAK GRADIENT: 6 MMHG
ECHO AV PEAK VELOCITY: 1.2 M/S
ECHO AV VELOCITY RATIO: 0.83
ECHO AV VTI: 26.9 CM
ECHO EST RA PRESSURE: 3 MMHG
ECHO LA DIAMETER INDEX: 2.07 CM/M2
ECHO LA DIAMETER: 3.4 CM
ECHO LA TO AORTIC ROOT RATIO: 1.13
ECHO LA VOL 2C: 52 ML (ref 22–52)
ECHO LA VOL 4C: 41 ML (ref 22–52)
ECHO LA VOLUME AREA LENGTH: 49 ML
ECHO LA VOLUME INDEX A2C: 32 ML/M2 (ref 16–34)
ECHO LA VOLUME INDEX A4C: 25 ML/M2 (ref 16–34)
ECHO LA VOLUME INDEX AREA LENGTH: 30 ML/M2 (ref 16–34)
ECHO LV E' LATERAL VELOCITY: 11 CM/S
ECHO LV E' SEPTAL VELOCITY: 10 CM/S
ECHO LV EDV A2C: 121 ML
ECHO LV EDV A4C: 107 ML
ECHO LV EDV BP: 115 ML (ref 56–104)
ECHO LV EDV INDEX A4C: 65 ML/M2
ECHO LV EDV INDEX BP: 70 ML/M2
ECHO LV EDV NDEX A2C: 74 ML/M2
ECHO LV EJECTION FRACTION A2C: 63 %
ECHO LV EJECTION FRACTION A4C: 67 %
ECHO LV EJECTION FRACTION BIPLANE: 64 % (ref 55–100)
ECHO LV ESV A2C: 45 ML
ECHO LV ESV A4C: 35 ML
ECHO LV ESV BP: 41 ML (ref 19–49)
ECHO LV ESV INDEX A2C: 27 ML/M2
ECHO LV ESV INDEX A4C: 21 ML/M2
ECHO LV ESV INDEX BP: 25 ML/M2
ECHO LV FRACTIONAL SHORTENING: 38 % (ref 28–44)
ECHO LV GLOBAL LONGITUDINAL STRAIN (GLS): -22.3 %
ECHO LV INTERNAL DIMENSION DIASTOLE INDEX: 2.87 CM/M2
ECHO LV INTERNAL DIMENSION DIASTOLIC: 4.7 CM (ref 3.9–5.3)
ECHO LV INTERNAL DIMENSION SYSTOLIC INDEX: 1.77 CM/M2
ECHO LV INTERNAL DIMENSION SYSTOLIC: 2.9 CM
ECHO LV IVSD: 0.6 CM (ref 0.6–0.9)
ECHO LV MASS 2D: 103.1 G (ref 67–162)
ECHO LV MASS INDEX 2D: 62.8 G/M2 (ref 43–95)
ECHO LV POSTERIOR WALL DIASTOLIC: 0.8 CM (ref 0.6–0.9)
ECHO LV RELATIVE WALL THICKNESS RATIO: 0.34
ECHO LVOT AREA: 3.1 CM2
ECHO LVOT AV VTI INDEX: 0.87
ECHO LVOT DIAM: 2 CM
ECHO LVOT MEAN GRADIENT: 2 MMHG
ECHO LVOT PEAK GRADIENT: 4 MMHG
ECHO LVOT PEAK VELOCITY: 1 M/S
ECHO LVOT STROKE VOLUME INDEX: 44.8 ML/M2
ECHO LVOT SV: 73.5 ML
ECHO LVOT VTI: 23.4 CM
ECHO MV A VELOCITY: 0.78 M/S
ECHO MV AREA PHT: 4.5 CM2
ECHO MV AREA VTI: 2.2 CM2
ECHO MV E DECELERATION TIME (DT): 170.3 MS
ECHO MV E VELOCITY: 1.01 M/S
ECHO MV E/A RATIO: 1.29
ECHO MV E/E' LATERAL: 9.18
ECHO MV E/E' RATIO (AVERAGED): 9.64
ECHO MV E/E' SEPTAL: 10.1
ECHO MV LVOT VTI INDEX: 1.41
ECHO MV MAX VELOCITY: 1 M/S
ECHO MV MEAN GRADIENT: 1 MMHG
ECHO MV MEAN VELOCITY: 0.4 M/S
ECHO MV PEAK GRADIENT: 4 MMHG
ECHO MV PRESSURE HALF TIME (PHT): 49.4 MS
ECHO MV VTI: 33 CM
ECHO RIGHT VENTRICULAR SYSTOLIC PRESSURE (RVSP): 21 MMHG
ECHO RV FREE WALL PEAK S': 11 CM/S
ECHO RV INTERNAL DIMENSION: 3.9 CM
ECHO RV TAPSE: 2.7 CM (ref 1.5–2)
ECHO TV REGURGITANT MAX VELOCITY: 2.14 M/S
ECHO TV REGURGITANT PEAK GRADIENT: 18 MMHG

## 2022-03-15 PROCEDURE — 93306 TTE W/DOPPLER COMPLETE: CPT | Performed by: INTERNAL MEDICINE

## 2022-03-17 ENCOUNTER — OFFICE VISIT (OUTPATIENT)
Dept: ONCOLOGY | Age: 42
End: 2022-03-17

## 2022-03-17 VITALS
HEART RATE: 51 BPM | HEIGHT: 62 IN | RESPIRATION RATE: 16 BRPM | BODY MASS INDEX: 25.73 KG/M2 | TEMPERATURE: 97.8 F | WEIGHT: 139.8 LBS | SYSTOLIC BLOOD PRESSURE: 141 MMHG | OXYGEN SATURATION: 100 % | DIASTOLIC BLOOD PRESSURE: 88 MMHG

## 2022-03-17 DIAGNOSIS — C50.912 STAGE II BREAST CANCER, LEFT (HCC): ICD-10-CM

## 2022-03-17 NOTE — PROGRESS NOTES
Jessica Yi is a 36 y.o. female is here for her Lupron injection. Lupron was sent to patient in a sealed container from her pharmacy. Lupron 3.75 mg given IM in left buttock without difficulty. Patient tolerated it well. Patient to schedule next injection in 28 days. 1. Have you been to the ER, urgent care clinic since your last visit? Hospitalized since your last visit? No    2. Have you seen or consulted any other health care providers outside of the [address] [place] [address] since your last visit? Include any pap smears or colon screening.  No

## 2022-03-18 PROBLEM — M85.89 OSTEOPENIA OF MULTIPLE SITES: Status: ACTIVE | Noted: 2021-12-22

## 2022-03-19 PROBLEM — C50.912: Status: ACTIVE | Noted: 2020-05-22

## 2022-03-28 ENCOUNTER — OFFICE VISIT (OUTPATIENT)
Dept: ONCOLOGY | Age: 42
End: 2022-03-28
Payer: COMMERCIAL

## 2022-03-28 VITALS
DIASTOLIC BLOOD PRESSURE: 90 MMHG | BODY MASS INDEX: 25.58 KG/M2 | HEART RATE: 51 BPM | HEIGHT: 62 IN | WEIGHT: 139 LBS | TEMPERATURE: 98 F | SYSTOLIC BLOOD PRESSURE: 146 MMHG | RESPIRATION RATE: 18 BRPM | OXYGEN SATURATION: 100 %

## 2022-03-28 DIAGNOSIS — M25.551 HIP PAIN, ACUTE, RIGHT: Primary | ICD-10-CM

## 2022-03-28 DIAGNOSIS — C50.912 STAGE II BREAST CANCER, LEFT (HCC): ICD-10-CM

## 2022-03-28 PROCEDURE — 99214 OFFICE O/P EST MOD 30 MIN: CPT | Performed by: INTERNAL MEDICINE

## 2022-03-28 NOTE — PROGRESS NOTES
Jaclyn Daly is a 36 y.o. female Follow up for the evaluation of breast cancer. 1. Have you been to the ER, urgent care clinic since your last visit? Hospitalized since your last visit? No    2. Have you seen or consulted any other health care providers outside of the [address] [place] [address] since your last visit? Include any pap smears or colon screening.  No

## 2022-03-28 NOTE — PROGRESS NOTES
Can[facility] [place] at [facility]  [address] [facility], [address] [place] [address]  [place] : [phone]  F: [phone]    Reason for Visit:   Chichi Camargo is a 36 y.o. female who is seen for follow up of breast cancer    Breast surgeon:  Dr. Mike Arroyo surg:  Dr. Quique Castillo onc:  Dr. Sawyer Staton    Treatment History:   · 20 left breast ax core bx: Adenocarcinoma, ER + at 86% ; VT + at 69%; HER 2 POSITIVE (IHC 2+, FISH ratio 3.2; sig/cell 6.08), ki67 29%  · 20 L breast core bx: Atypical 1 mm focus, highly suspicious for [facility], lobular features, the tumor does not histologically match the patient's prior axillary cancer, but could represent a small sample of the same tumor  · 20 L breast excisional bx: Subareolar lumpectomy, IDC, 1.6 cm, invasive tumor present at anterior and lateral margins, gr 2, + LVI, 1/1 LN involved, 1.1 cm, ER + at 48%, VT + at 72%, HER 2 POSITIVE (IHC 2+, FISH ratio 2, sig/cell 4.14)  · 20 Gustavo Cottrell genetic testing:  negative  · [facility] 2020-2020  · 2020 Bilateral mastectomy: Right breast: benign. Left breast: Inflammation, no residual carcinoma, 2/6 LNs (micromets in both, 1.7 mm, and 1.1 mm). pT1c ypN1mi cM0  · T-DM1 20-21  · XRT 20-21   · Anastrozole 2021-21 (menses returned); 21-  · Lupron 21-     History of Present Illness:   She noticed a L axilla mass in 2020, leading to the pathology above. Interval history:  Patient presents today for follow up on Anastrozole and lupron. Reports gr 1 hot flashes, gr 1 pain/cramping. S/p covid 19 vaccines    FH:   No breast, ovarian, prostate, or pancreas cancer    LMP 21    Past Medical History:   Diagnosis Date    Anxiety     Cancer [facility])     Essential hypertension     HX OTHER MEDICAL ,         Infertility     IVF with first pregnancy    Infertility, female     Joint pain     Psychiatric problem       Past Surgical History: Procedure Laterality Date    HX BILATERAL MASTECTOMY  2020    HX OTHER SURGICAL      [place] Teeth Removal    HX OTHER SURGICAL      1/2017 Excision of mole on toe with skin graph      Social History     Tobacco Use    Smoking status: Never Smoker    Smokeless tobacco: Never Used   Substance Use Topics    Alcohol use: Yes      Family History   Problem Relation Age of Onset    Diabetes Mother     Heart Disease Mother     Hypertension Mother     Hypertension Father     Cancer Paternal Grandmother         Lung cancer    Stroke Paternal Grandmother     Cancer Paternal Grandfather         Melanoma     Current Outpatient Medications   Medication Sig    anastrozole (ARIMIDEX) 1 mg tablet TAKE 1 TABLET BY MOUTH DAILY    metoprolol tartrate (LOPRESSOR) 50 mg tablet TAKE 1 TABLET BY MOUTH TWICE A DAY    Lupron Depot 3.75 mg injection RECONSTITUTE AS DIRECTED. INJECT 3.75 MG INTRAMUSCULARLY EVERY 4 WEEKS.  celecoxib (CELEBREX) 200 mg capsule     olmesartan-hydroCHLOROthiazide (BENICAR HCT) 20-12.5 mg per tablet Take 1 Tablet by mouth daily.  loratadine (Claritin) 10 mg tablet Take 10 mg by mouth.  fluticasone propionate (FLONASE ALLERGY RELIEF NA) by Nasal route.  acetaminophen (TYLENOL PO) Take  by mouth.  ondansetron (ZOFRAN ODT) 8 mg disintegrating tablet Take 1 Tab by mouth every eight (8) hours as needed for Nausea or Vomiting. For 2 days following chemo    ibuprofen (MOTRIN) 600 mg tablet Take 1 Tab by mouth every six (6) hours as needed for Pain.  sertraline (ZOLOFT) 25 mg tablet Take 25 mg by mouth daily.  oxybutynin chloride XL (DITROPAN XL) 10 mg CR tablet Take 1 Tablet by mouth daily. (Patient not taking: Reported on 3/28/2022)     No current facility-administered medications for this visit. No Known Allergies     Review of Systems: A complete review of systems was obtained, negative except as described above.     Physical Exam:     Visit Vitals  BP (!) 146/90   Pulse (!) 51   Temp 98 °F (36.7 °C) (Temporal)   Resp 18   Ht 5' 2\" (1.575 m)   Wt 139 lb (63 kg)   SpO2 100%   BMI 25.42 kg/m²     ECOG PS: 0  General: no distress  Eyes: anicteric sclerae  HENT: oropharynx clear  Neck: supple  Respiratory: normal respiratory effort  CV: no peripheral edema  GI: nondistended  Skin: no rashes; no ecchymoses; no petechiae    Results:     Lab Results   Component Value Date/Time    WBC 5.0 09/07/2021 01:21 PM    HGB 12.4 09/07/2021 01:21 PM    HCT 36.3 09/07/2021 01:21 PM    PLATELET 512 (L) 45/66/0567 01:21 PM    MCV 87.1 09/07/2021 01:21 PM    ABS. NEUTROPHILS 3.0 09/07/2021 01:21 PM     Lab Results   Component Value Date/Time    Sodium 136 02/17/2022 01:34 PM    Potassium HEMOLYZED,RECOLLECT REQUESTED 02/17/2022 01:34 PM    Chloride 100 02/17/2022 01:34 PM    CO2 33 (H) 02/17/2022 01:34 PM    Glucose 87 02/17/2022 01:34 PM    BUN 20 02/17/2022 01:34 PM    Creatinine 0.81 02/17/2022 01:34 PM    GFR est AA >60 02/17/2022 01:34 PM    GFR est non-AA >60 02/17/2022 01:34 PM    Calcium 9.3 02/17/2022 01:34 PM    Sodium (POC) 142 02/17/2022 01:30 PM    Potassium (POC) 3.5 02/17/2022 01:30 PM    Chloride (POC) 104 02/17/2022 01:30 PM    Glucose (POC) 88 02/17/2022 01:30 PM    Creatinine (POC) 0.68 02/17/2022 01:30 PM    Calcium, ionized (POC) 1.10 (L) 02/17/2022 01:30 PM     Lab Results   Component Value Date/Time    Bilirubin, total 0.8 02/17/2022 01:34 PM    ALT (SGPT) 30 02/17/2022 01:34 PM    Alk. phosphatase 81 02/17/2022 01:34 PM    Protein, total 7.9 02/17/2022 01:34 PM    Albumin 4.2 02/17/2022 01:34 PM    Globulin 3.7 02/17/2022 01:34 PM     5/20/20 breast  MRI  Subareolar region of L breast 1.2 cm mass, at least 2 LN on left, 3 cm largest  Right breast negative    5/20/20 bone scan  Heterogeneous activity in the ribs of uncertain significance.   This would be an unusual presentation of bony metastatic disease    5/20/20 CT c/a/p  negative    12/26/20 estradiol 8.6  FSH 95  LH 38.5    5/4/21 estradiol 441  FSH 8  LH 7.8    Records reviewed and summarized above. Assessment/plan:   1.  L breast cancer, LN + by core, ER +, AR +, HER 2 POSITIVE, cT1c cN1a cM0:  Stage IIA, prognostic stage IB  premenopausal    We explained to the patient that the goal of systemic adjuvant therapy is to improve the chances for cure and decrease the risk of relapse. We explained why a patient can have microscopic cancer spread now even though physical examination, laboratory studies and imaging studies are negative for cancer. We explained that the same treatments used now as adjuvant or preventive treatments rarely if ever are curative in women who develop metastases. TTE on 5/28/2020 EF 64%, TTE on 8/10/2020, EF 60%, TTE on 9/11/2020, EF 61%, overall stable, TTE on 12/2/2020, EF 60%, GLS stable. 3/8/21 TTE EF 62%, GLS normal; 6/2/21 TTE stable without change. 9/15/21 TTE EF 61%. 3/15/22. Bilateral mastectomies on 11/12/2020, no residual carcinoma in breast, 2/6 LNs (micromets). Discussed neratinib 240 mg daily with food for one year after the completion of herceptin. Discussed that there was only a 2% DFS benefit and that benefit was largely driven in the ER + population. Discussed that there are no data in its use post-T-DM1. She and I decline this at this time     Discussed common side effects of neratinib including but not limited to diarrhea, nausea, abdominal pain, fatigue, vomiting, rash, swollen and sore mouth (stomatitis), decreased appetite, muscle spasms, indigestion (dyspepsia), liver damage (AST or ALT enzyme increase), nail disorder, dry skin, abdominal swelling (distention), weight loss and urinary tract infection.     She is premenopausal, her cycles have returned    We discussed that for high risk women with breast cancer (having either received chemotherapy or < 28years old), ovarian suppression(such as monthly lupron 3.75 mg IM) + AI was superior in terms of overall survival than tamoxifen alone. The added risks of worse QOL due to depression and worse menopausal symptoms were discussed with the addition of ovarian suppression. The risks and benefits of tamoxifen were discussed in detail and the patient was informed of the following: Risks include a 1% risk of endometrial cancer for postmenopausal women treated for five years but no (or a minimally increased) risk in premenopausal women and that most women who develop tamoxifen-associated endometrial cancer can be cured. Any bleeding in a postmenopausal woman should be reported to a health care professional. There is also a 1% risk of blood clots (thromboembolism) that can be fatal. All patients irrespective of age who take tamoxifen and who have not had a hysterectomy should have a pelvic exam and Pap smear yearly. Tamoxifen increases the risk of cataract formation and on rare occasions has caused retinal damage: an eye exam is recommended yearly. Other risks include vaginal discharge or dryness, the development or worsening of hot flashes or vasomotor symptoms, and bone loss in premenopausal women. There is excellent evidence that tamoxifen does not increase risk of depression, cause weight gain or have a major effect on sexual function. Available data suggests little or no effect on cognitive function. Benefits include a lowering of cholesterol and a reduction in the rate of bone loss for postmenopausal woman. Any other symptoms should be reported. After this discussion, she is agreeable to lupron 3.75 mg q 4 weeks and anastrozole (started after her 2nd injection). She is interested in discussing bilateral oophorectomy, referred to gyn onc. She is schedule 5/23/22. She is tolerating well. Will continue    We will plan to see the patient in follow up at least once per cycle, or sooner if symptoms warrant.  Labs with each cycle to monitor for toxicity    She brought up [id number], Vaccine to prevent recurrence in patients with HER-2 positive breast cancer. She is not eligible for this. 2. Emotional well being:  She has excellent support and is coping well with her disease; on zoloft 75 mg daily    3. Genetic testing:  discussed potential ramifications of a positive test including the potential need for bilateral mastectomies and bilateral oophorectomies and the risk then for her family members to also have a mutation. VUS also discussed. Tested in Dr. Karlene Morley office on 5/21/20, negative    4. Loss of fertility:  Discussed potential loss of menses and fertility. She is not interested in having further children. Declines oncofertility consult    5. Decline in LV strain and HTN: 7.8% decline since initial TTE on 5/28/2020, started on Coreg 3.125 mg BID. Repeat TTE on 9/10/2020, EF 61%, stable, 12/2/2020, stable 60%, strain stable. 3/8/21 TTE EF 62%, strain improved and normal.  Previously increased to coreg 12.5 mg bid. TTE 6/2/2021 with EF 62%, no interval change. TTE 9/15/21 stable. TTE 3/15/22 EF 64%. 6. Thrombocytopenia:  Due to chemo. Will monitor. T-DM1 may cause thrombocytopenia    7. Joint pain:  Due to AI; improved; is taking omega 3 FA; discussed tart cherry juice again today. 8. L side ROM issues:  Previously referred to PT. 9. HTN. Diagnosed during her 4th pregnancy. Metoprolol 50mg BID. Also on benicar 12.5-25 mg daily; improved at home    10. ast elevated:  Likely due to T-DM1, mild, monitoring    11. Hot flashes: prescribed oxybutynin 10 mg XL but she has not started. 12. Osteopenia: dexa 1/2021; We discussed the data from [id number] [address], [address], for the meta-analysis for adjuvant bisphosphonate use in breast cancer. We discussed that in postmenopausal women, it appears that the bisphosphate zolendronic acid, given as in ABCSG 12 at 4 mg IV q 6 months x 3 years, is beneficial in improving bone DFS and OS.   We discussed side effects such as ONJ and the need to avoid invasive dental procedures while on these medications, renal damage, and hypocalcemia. She is agreeable to this, #1 given 2/17/22. 13. Right hip pain: ongoing for a few months, intermittent. Improves on its own, she point to muscle, not joint, will monitor. Discussed scans, declined for now    I personally saw and evaluated the patient and performed the entire medical decision making. The history, physical exam, and documentation were performed by Chaparrita Osuna NP. I reviewed and verified the above documentation and modified it as needed. Left breast cancer, ER +, HER 2 +. On lupron + anastrozole, tolerating well, planning for oophorectomies with Dr. Augie Liu. Having right hip pain, likely due to anastrozole, will monitor, discussed scans with her. RTC 6 months      Signed By: Royce Menezes MD      No orders of the defined types were placed in this encounter.

## 2022-04-14 ENCOUNTER — OFFICE VISIT (OUTPATIENT)
Dept: ONCOLOGY | Age: 42
End: 2022-04-14

## 2022-04-14 VITALS
HEART RATE: 55 BPM | SYSTOLIC BLOOD PRESSURE: 132 MMHG | RESPIRATION RATE: 18 BRPM | OXYGEN SATURATION: 99 % | WEIGHT: 143 LBS | TEMPERATURE: 98 F | HEIGHT: 62 IN | BODY MASS INDEX: 26.31 KG/M2 | DIASTOLIC BLOOD PRESSURE: 78 MMHG

## 2022-04-14 DIAGNOSIS — C50.912 STAGE II BREAST CANCER, LEFT (HCC): ICD-10-CM

## 2022-04-14 NOTE — PROGRESS NOTES
Gal Mcginnis is a 36 y.o. female is here for her Lupron injection. Lupron was sent to patient in a sealed container from her pharmacy. Lupron 3.75 mg given IM in right buttock without difficulty. Patient tolerated it well. Patient to schedule next injection in 28 days. 1. Have you been to the ER, urgent care clinic since your last visit? Hospitalized since your last visit? No    2. Have you seen or consulted any other health care providers outside of the [address] [place] [address] since your last visit? Include any pap smears or colon screening.  No    Patient: Jess Haines  : 1980  Medication: LupronDepot  Dosage: 3.75mg  Route: Intramuscular  Site: Right Buttock  RX #: [id number]  Lot Number: [id number]  Expiration date: July 15, 2024  [address]: [id number]  SN: [id number]  Administered by:Alonzo Hernandez LPN

## 2022-06-02 ENCOUNTER — RESEARCH ENCOUNTER (OUTPATIENT)
Dept: ONCOLOGY | Age: 42
End: 2022-06-02

## 2022-06-02 NOTE — PROGRESS NOTES
Pt called on the phone today per  protocol. This is the 104 week time point on study. QOLs completed. Next time point for QOLs will be at 156 weeks.

## 2022-07-15 RX ORDER — METOPROLOL TARTRATE 50 MG/1
TABLET ORAL
Qty: 180 TABLET | Refills: 1 | Status: SHIPPED | OUTPATIENT
Start: 2022-07-15

## 2022-07-21 ENCOUNTER — APPOINTMENT (OUTPATIENT)
Dept: INFUSION THERAPY | Age: 42
End: 2022-07-21

## 2022-08-25 ENCOUNTER — HOSPITAL ENCOUNTER (OUTPATIENT)
Dept: INFUSION THERAPY | Age: 42
Discharge: HOME | End: 2022-08-25
Payer: COMMERCIAL

## 2022-08-25 VITALS
BODY MASS INDEX: 26.13 KG/M2 | OXYGEN SATURATION: 98 % | WEIGHT: 142 LBS | HEIGHT: 62 IN | HEART RATE: 58 BPM | TEMPERATURE: 98.4 F | DIASTOLIC BLOOD PRESSURE: 60 MMHG | RESPIRATION RATE: 16 BRPM | SYSTOLIC BLOOD PRESSURE: 100 MMHG

## 2022-08-25 DIAGNOSIS — M85.89 OSTEOPENIA OF MULTIPLE SITES: Primary | ICD-10-CM

## 2022-08-25 DIAGNOSIS — C50.912 STAGE II BREAST CANCER, LEFT (HCC): ICD-10-CM

## 2022-08-25 LAB
ANION GAP BLD CALC-SCNC: 12 MMOL/L (ref 10–20)
CA-I BLD-MCNC: 1.16 MMOL/L (ref 1.12–1.32)
CHLORIDE BLD-SCNC: 101 MMOL/L (ref 98–107)
CO2 BLD-SCNC: 28.4 MMOL/L (ref 21–32)
CREAT BLD-MCNC: 0.79 MG/DL (ref 0.6–1.3)
GLUCOSE BLD-MCNC: 98 MG/DL (ref 65–100)
POTASSIUM BLD-SCNC: 3.3 MMOL/L (ref 3.5–5.1)
SERVICE CMNT-IMP: ABNORMAL
SODIUM BLD-SCNC: 140 MMOL/L (ref 136–145)

## 2022-08-25 PROCEDURE — 74011000250 HC RX REV CODE- 250: Performed by: NURSE PRACTITIONER

## 2022-08-25 PROCEDURE — 80047 BASIC METABLC PNL IONIZED CA: CPT

## 2022-08-25 PROCEDURE — 96374 THER/PROPH/DIAG INJ IV PUSH: CPT

## 2022-08-25 PROCEDURE — 80048 BASIC METABOLIC PNL TOTAL CA: CPT

## 2022-08-25 PROCEDURE — 74011250636 HC RX REV CODE- 250/636: Performed by: NURSE PRACTITIONER

## 2022-08-25 RX ORDER — SODIUM CHLORIDE 9 MG/ML
10 INJECTION INTRAMUSCULAR; INTRAVENOUS; SUBCUTANEOUS AS NEEDED
Status: DISCONTINUED | OUTPATIENT
Start: 2022-08-25 | End: 2022-08-26 | Stop reason: HOSPADM

## 2022-08-25 RX ORDER — SODIUM CHLORIDE 0.9 % (FLUSH) 0.9 %
10 SYRINGE (ML) INJECTION AS NEEDED
Status: DISCONTINUED | OUTPATIENT
Start: 2022-08-25 | End: 2022-08-26 | Stop reason: HOSPADM

## 2022-08-25 RX ORDER — SODIUM CHLORIDE 9 MG/ML
25 INJECTION, SOLUTION INTRAVENOUS CONTINUOUS
Status: DISCONTINUED | OUTPATIENT
Start: 2022-08-25 | End: 2022-08-26 | Stop reason: HOSPADM

## 2022-08-25 RX ADMIN — SODIUM CHLORIDE 25 ML/HR: 9 INJECTION, SOLUTION INTRAVENOUS at 14:23

## 2022-08-25 RX ADMIN — ZOLEDRONIC ACID 4 MG: 0.04 INJECTION, SOLUTION INTRAVENOUS at 14:24

## 2022-08-25 RX ADMIN — Medication 10 ML: at 14:43

## 2022-08-25 NOTE — PROGRESS NOTES
[facility] Progress Note    Date: 2022    Name: Renato Sharp    MRN: [id number]         : 1980    Ms. Lee Swartz arrived ambulatory and in no distress for C2D1 [facility]. Assessment was completed, no acute issues at this time, no new complaints voiced. 24 G PIV established to right arm, + blood return. Lab drawn and processed. Patient denies any recent or planned dental work. Ms. Mulligan's vitals were reviewed. Visit Vitals  /60 (BP 1 Location: Right upper arm, BP Patient Position: At rest;Sitting)   Pulse (!) 58   Temp 98.4 °F (36.9 °C)   Resp 16   Ht 5' 2\" (1.575 m)   Wt 64.4 kg (142 lb)   SpO2 98%   Breastfeeding No   BMI 25.97 kg/m²       Lab results were obtained and reviewed. Recent Results (from the past 12 hour(s))   POC CHEM8    Collection Time: 22  2:08 PM   Result Value Ref Range    Calcium, ionized (POC) 1.16 1.12 - 1.32 mmol/L    Sodium (POC) 140 136 - 145 mmol/L    Potassium (POC) 3.3 (L) 3.5 - 5.1 mmol/L    Chloride (POC) 101 98 - 107 mmol/L    CO2 (POC) 28.4 21 - 32 mmol/L    Anion gap (POC) 12 10 - 20 mmol/L    Glucose (POC) 98 65 - 100 mg/dL    Creatinine (POC) 0.79 0.6 - 1.3 mg/dL    GFRAA, POC >60 >60 ml/min/1.73m2    GFRNA, POC >60 >60 ml/min/1.73m2    Comment Comment Not Indicated.          Medications:  Medications Administered       0.9% sodium chloride infusion       Admin Date  2022 Action  New Bag Dose  25 mL/hr Rate  25 mL/hr Route  IntraVENous Administered By  [place] Carpenter RN              0.9% sodium chloride injection 10 mL       Admin Date  2022 Action  Given Dose  10 mL Route  IntraVENous Administered By  [place] Carpenter RN              zoledronic acid (ZOMETA) 4 mg/100mL infusion       Admin Date  2022 Action  New Bag Dose  4 mg Rate  400 mL/hr Route  IntraVENous Administered By  [place] Carpenter RN                     Ms. Lee Swartz tolerated treatment well and was discharged from [place] [address] in stable condition at 1450. PIV flushed & removed. Message sent to  to make appointment for February 2023. Patient aware.     Blanco Silverman RN  August 25, 2022

## 2022-10-25 ENCOUNTER — OFFICE VISIT (OUTPATIENT)
Dept: ONCOLOGY | Age: 42
End: 2022-10-25
Payer: COMMERCIAL

## 2022-10-25 VITALS
SYSTOLIC BLOOD PRESSURE: 142 MMHG | HEIGHT: 62 IN | OXYGEN SATURATION: 100 % | WEIGHT: 140.8 LBS | HEART RATE: 56 BPM | DIASTOLIC BLOOD PRESSURE: 97 MMHG | TEMPERATURE: 98 F | BODY MASS INDEX: 25.91 KG/M2 | RESPIRATION RATE: 18 BRPM

## 2022-10-25 DIAGNOSIS — C50.912 STAGE II BREAST CANCER, LEFT (HCC): Primary | ICD-10-CM

## 2022-10-25 PROCEDURE — 3078F DIAST BP <80 MM HG: CPT | Performed by: INTERNAL MEDICINE

## 2022-10-25 PROCEDURE — 3074F SYST BP LT 130 MM HG: CPT | Performed by: INTERNAL MEDICINE

## 2022-10-25 PROCEDURE — 99214 OFFICE O/P EST MOD 30 MIN: CPT | Performed by: INTERNAL MEDICINE

## 2022-10-25 NOTE — PROGRESS NOTES
[facility] [place] [facility] [place] [address] Rank: [phone]  F: [phone]    Reason for Visit:   Jess Sotelo is a 36 y.o. female who is seen for follow up of breast cancer    Breast surgeon:  Dr. Tucker Nix surg:  Dr. Lara Bailon onc:  Dr. Mejia Easton    Treatment History:   20 left breast ax core bx: Adenocarcinoma, ER + at 86% ; HI + at 69%; HER 2 POSITIVE (IHC 2+, FISH ratio 3.2; sig/cell 6.08), ki67 29%  20 L breast core bx: Atypical 1 mm focus, highly suspicious for [facility], lobular features, the tumor does not histologically match the patient's prior axillary cancer, but could represent a small sample of the same tumor  20 L breast excisional bx: Subareolar lumpectomy, IDC, 1.6 cm, invasive tumor present at anterior and lateral margins, gr 2, + LVI, 1/1 LN involved, 1.1 cm, ER + at 48%, HI + at 72%, HER 2 POSITIVE (IHC 2+, FISH ratio 2, sig/cell 4.14)  20 [facility] genetic testing:  negative  TCHP 2020-2020 Bilateral mastectomy: Right breast: benign. Left breast: Inflammation, no residual carcinoma, 2/6 LNs (micromets in both, 1.7 mm, and 1.1 mm). pT1c ypN1mi cM0  T-DM1 20-21  XRT 20-21   Anastrozole 2021-21 (menses returned); 21- current   Lupron 21- stopped 2022   BSO 22 - benign    History of Present Illness:   She noticed a L axilla mass in 2020, leading to the pathology above. Interval history:  Patient presents today for follow up on Anastrozole and lupron. Reports gr 2 hot flashes, gr 1 loss of libido, gr 1 vaginal dryness. S/p covid 19 vaccines    FH:   No breast, ovarian, prostate, or pancreas cancer    LMP 4/30/21    Past Medical History:   Diagnosis Date    Anxiety     Cancer [facility])     Essential hypertension     HX OTHER MEDICAL 2010        Infertility     IVF with first pregnancy    Infertility, female     Joint pain Psychiatric problem       Past Surgical History:   Procedure Laterality Date    HX BILATERAL MASTECTOMY  2020    HX OTHER SURGICAL      [place] Teeth Removal    HX OTHER SURGICAL      1/2017 Excision of mole on toe with skin graph      Social History     Tobacco Use    Smoking status: Never    Smokeless tobacco: Never   Substance Use Topics    Alcohol use: Yes      Family History   Problem Relation Age of Onset    Diabetes Mother     Heart Disease Mother     Hypertension Mother     Hypertension Father     Cancer Paternal Grandmother         Lung cancer    Stroke Paternal Grandmother     Cancer Paternal Grandfather         Melanoma     Current Outpatient Medications   Medication Sig    OTHER     metoprolol tartrate (LOPRESSOR) 50 mg tablet TAKE 1 TABLET BY MOUTH TWICE A DAY    anastrozole (ARIMIDEX) 1 mg tablet TAKE 1 TABLET BY MOUTH DAILY    loratadine (CLARITIN) 10 mg tablet Take 10 mg by mouth. fluticasone propionate (FLONASE ALLERGY RELIEF NA) by Nasal route. acetaminophen (TYLENOL PO) Take  by mouth. ondansetron (ZOFRAN ODT) 8 mg disintegrating tablet Take 1 Tab by mouth every eight (8) hours as needed for Nausea or Vomiting. For 2 days following chemo    ibuprofen (MOTRIN) 600 mg tablet Take 1 Tab by mouth every six (6) hours as needed for Pain. sertraline (ZOLOFT) 25 mg tablet Take 25 mg by mouth daily. celecoxib (CELEBREX) 200 mg capsule  (Patient not taking: Reported on 10/25/2022)     No current facility-administered medications for this visit. No Known Allergies     Review of Systems: A complete review of systems was obtained, negative except as described above.     Physical Exam:     Visit Vitals  BP (!) 142/97   Pulse (!) 56   Temp 98 °F (36.7 °C) (Temporal)   Resp 18   Ht 5' 2\" (1.575 m)   Wt 140 lb 12.8 oz (63.9 kg)   SpO2 100%   BMI 25.75 kg/m²     ECOG PS: 0  General: no distress  Eyes: anicteric sclerae  HENT: oropharynx clear  Neck: supple  Respiratory: normal respiratory effort  CV: no peripheral edema  GI: nondistended  Skin: no rashes; no ecchymoses; no petechiae    Results:     Lab Results   Component Value Date/Time    WBC 5.0 09/07/2021 01:21 PM    HGB 12.4 09/07/2021 01:21 PM    HCT 36.3 09/07/2021 01:21 PM    PLATELET 281 (L) 55/96/3137 01:21 PM    MCV 87.1 09/07/2021 01:21 PM    ABS. NEUTROPHILS 3.0 09/07/2021 01:21 PM     Lab Results   Component Value Date/Time    Sodium 136 02/17/2022 01:34 PM    Potassium HEMOLYZED,RECOLLECT REQUESTED 02/17/2022 01:34 PM    Chloride 100 02/17/2022 01:34 PM    CO2 33 (H) 02/17/2022 01:34 PM    Glucose 87 02/17/2022 01:34 PM    BUN 20 02/17/2022 01:34 PM    Creatinine 0.81 02/17/2022 01:34 PM    GFR est AA >60 02/17/2022 01:34 PM    GFR est non-AA >60 02/17/2022 01:34 PM    Calcium 9.3 02/17/2022 01:34 PM    Sodium (POC) 140 08/25/2022 02:08 PM    Potassium (POC) 3.3 (L) 08/25/2022 02:08 PM    Chloride (POC) 101 08/25/2022 02:08 PM    Glucose (POC) 98 08/25/2022 02:08 PM    Creatinine (POC) 0.79 08/25/2022 02:08 PM    Calcium, ionized (POC) 1.16 08/25/2022 02:08 PM     Lab Results   Component Value Date/Time    Bilirubin, total 0.8 02/17/2022 01:34 PM    ALT (SGPT) 30 02/17/2022 01:34 PM    Alk. phosphatase 81 02/17/2022 01:34 PM    Protein, total 7.9 02/17/2022 01:34 PM    Albumin 4.2 02/17/2022 01:34 PM    Globulin 3.7 02/17/2022 01:34 PM     5/20/20 breast  MRI  Subareolar region of L breast 1.2 cm mass, at least 2 LN on left, 3 cm largest  Right breast negative    5/20/20 bone scan  Heterogeneous activity in the ribs of uncertain significance. This would be an unusual presentation of bony metastatic disease    5/20/20 CT c/a/p  negative    12/26/20 estradiol 8.6  FSH 95  LH 38.5    5/4/21 estradiol 441  FSH 8  LH 7.8    Records reviewed and summarized above.       Assessment/plan:   1.  L breast cancer, LN + by core, ER +, RI +, HER 2 POSITIVE, cT1c cN1a cM0:  Stage IIA, prognostic stage IB  postmenopausal    We explained to the patient that the goal of systemic adjuvant therapy is to improve the chances for cure and decrease the risk of relapse. We explained why a patient can have microscopic cancer spread now even though physical examination, laboratory studies and imaging studies are negative for cancer. We explained that the same treatments used now as adjuvant or preventive treatments rarely if ever are curative in women who develop metastases. TTE on 5/28/2020 EF 64%, TTE on 8/10/2020, EF 60%, TTE on 9/11/2020, EF 61%, overall stable, TTE on 12/2/2020, EF 60%, GLS stable. 3/8/21 TTE EF 62%, GLS normal; 6/2/21 TTE stable without change. 9/15/21 TTE EF 61%. 3/15/22 TTE with EF 64%. Bilateral mastectomies on 11/12/2020, no residual carcinoma in breast, 2/6 LNs (micromets). Discussed neratinib 240 mg daily with food for one year after the completion of herceptin. Discussed that there was only a 2% DFS benefit and that benefit was largely driven in the ER + population. Discussed that there are no data in its use post-T-DM1. She and I decline this at this time     Discussed common side effects of neratinib including but not limited to diarrhea, nausea, abdominal pain, fatigue, vomiting, rash, swollen and sore mouth (stomatitis), decreased appetite, muscle spasms, indigestion (dyspepsia), liver damage (AST or ALT enzyme increase), nail disorder, dry skin, abdominal swelling (distention), weight loss and urinary tract infection. She is tolerating anastrozole well. Will continue    2. Emotional well being:  She has excellent support and is coping well with her disease; on zoloft 75 mg daily. She would like information about counseling - will reach out to [facility]. 3. Genetic testing:  discussed potential ramifications of a positive test including the potential need for bilateral mastectomies and bilateral oophorectomies and the risk then for her family members to also have a mutation. VUS also discussed.  Tested in Dr. Mariela Storey office on 5/21/20, negative    4. Loss of fertility:  Discussed potential loss of menses and fertility. She is not interested in having further children. Declines oncofertility consult    5. Decline in LV strain and HTN: 7.8% decline since initial TTE on 5/28/2020, started on Coreg 3.125 mg BID. Repeat TTE on 9/10/2020, EF 61%, stable, 12/2/2020, stable 60%, strain stable. 3/8/21 TTE EF 62%, strain improved and normal.  Previously increased to coreg 12.5 mg bid. TTE 6/2/2021 with EF 62%, no interval change. TTE 9/15/21 stable. TTE 3/15/22 EF 64%. 6. Thrombocytopenia:  Due to TDM1 - mild. Plt 143 9/7/21. 7. Joint pain:  Due to AI; improved; is taking omega 3 FA; discussed tart cherry juice again today, she will try. 8. L side ROM issues:  previously referred to PT and improved. 9. HTN:  Metoprolol 50mg BID. Also on HCTZ 12.5mg daily. She states /80 this morning. 10. AST elevated:  Likely due to T-DM1, mild, monitoring. 11. Hot flashes: prescribed oxybutynin 10 mg XL but she declined taking. She feels they are now stable/tolerable. 12. Osteopenia: dexa 1/2021; We discussed the data from [id number] [place] [address], [facility] 2013, for the meta-analysis for adjuvant bisphosphonate use in breast cancer. We discussed that in postmenopausal women, it appears that the bisphosphate zolendronic acid, given as in ABCSG 12 at 4 mg IV q 6 months x 3 years, is beneficial in improving bone DFS and OS. We discussed side effects such as ONJ and the need to avoid invasive dental procedures while on these medications, renal damage, and hypocalcemia. She is agreeable to this, #1 given 2/17/22, #2 8/25/22, next due 2/9/22. 13. Right hip pain: ongoing for a few months, intermittent. Improves with rest - she feels muscular due to running. 14. Weight gain: she would like to discuss with Stephanie Camarena RD. Will have her reach out.      I personally saw and evaluated the patient and performed the entire medical decision making. The history, physical exam, and documentation were performed by Abdirahman Davis NP. I reviewed and verified the above documentation and modified it as needed. L breast cancer, ER +, surgically postmenopausal, now on anastrozole, SARAN, tolerating well. On zometa for osteopenia, next due in Feb.  Rtc 6 months      Signed By: Godwni Arriaga MD      No orders of the defined types were placed in this encounter.

## 2022-10-25 NOTE — PROGRESS NOTES
César Molina is a 36 y.o. female Follow up for the evaluation of breast cancer. 1. Have you been to the ER, urgent care clinic since your last visit? Hospitalized since your last visit? No    2. Have you seen or consulted any other health care providers outside of the [address] [place] [address] since your last visit? Include any pap smears or colon screening.  No

## 2022-11-08 ENCOUNTER — TELEPHONE (OUTPATIENT)
Dept: ONCOLOGY | Age: 42
End: 2022-11-08

## 2022-11-08 NOTE — TELEPHONE ENCOUNTER
Cancer [place] [facility] [place] [address] [facility]  Walker Mailman: [phone]  F: [phone]    Medical Nutrition Therapy    Nutrition Encounter:  Referral received from Sanchez Davis NP in regards to weight management. Called patient, spoke with her. Explained that RD is available to provide assistance in making positive dietary behavior changes for cancer prevention and reduction. She reports since having her 4th child, her weight has been in the mid 120# and after chemo she gained about 15#. She has always been thin and never had to watch what she eats. She reports exercising 3-5 times a week. She runs, bikes and does weight training. We discussed general healthy eating and ways to health. Offered to send her information via [employer] and her to send a food diary if desired. Contact information provided.       [address]y [address]

## 2023-01-23 RX ORDER — METOPROLOL TARTRATE 50 MG/1
TABLET ORAL
Qty: 180 TABLET | Refills: 1 | Status: SHIPPED | OUTPATIENT
Start: 2023-01-23

## 2023-02-09 ENCOUNTER — HOSPITAL ENCOUNTER (OUTPATIENT)
Dept: INFUSION THERAPY | Age: 43
Discharge: HOME | End: 2023-02-09
Payer: COMMERCIAL

## 2023-02-09 VITALS
DIASTOLIC BLOOD PRESSURE: 75 MMHG | WEIGHT: 141.9 LBS | BODY MASS INDEX: 26.11 KG/M2 | SYSTOLIC BLOOD PRESSURE: 128 MMHG | RESPIRATION RATE: 16 BRPM | TEMPERATURE: 97.8 F | OXYGEN SATURATION: 98 % | HEART RATE: 50 BPM | HEIGHT: 62 IN

## 2023-02-09 DIAGNOSIS — M85.89 OSTEOPENIA OF MULTIPLE SITES: Primary | ICD-10-CM

## 2023-02-09 LAB
ANION GAP BLD CALC-SCNC: 12 MMOL/L (ref 10–20)
CA-I BLD-MCNC: 1.16 MMOL/L (ref 1.12–1.32)
CHLORIDE BLD-SCNC: 97 MMOL/L (ref 98–107)
CO2 BLD-SCNC: 29.4 MMOL/L (ref 21–32)
CREAT BLD-MCNC: 0.78 MG/DL (ref 0.6–1.3)
GLUCOSE BLD-MCNC: 120 MG/DL (ref 65–100)
POTASSIUM BLD-SCNC: 3.3 MMOL/L (ref 3.5–5.1)
SERVICE CMNT-IMP: ABNORMAL
SODIUM BLD-SCNC: 137 MMOL/L (ref 136–145)

## 2023-02-09 PROCEDURE — 74011250636 HC RX REV CODE- 250/636: Performed by: NURSE PRACTITIONER

## 2023-02-09 PROCEDURE — 96374 THER/PROPH/DIAG INJ IV PUSH: CPT

## 2023-02-09 PROCEDURE — 80048 BASIC METABOLIC PNL TOTAL CA: CPT

## 2023-02-09 PROCEDURE — 80047 BASIC METABLC PNL IONIZED CA: CPT

## 2023-02-09 RX ORDER — SODIUM CHLORIDE 9 MG/ML
25 INJECTION, SOLUTION INTRAVENOUS CONTINUOUS
Status: DISCONTINUED | OUTPATIENT
Start: 2023-02-09 | End: 2023-02-10 | Stop reason: HOSPADM

## 2023-02-09 RX ADMIN — ZOLEDRONIC ACID 4 MG: 0.04 INJECTION, SOLUTION INTRAVENOUS at 15:01

## 2023-02-09 RX ADMIN — SODIUM CHLORIDE 25 ML/HR: 9 INJECTION, SOLUTION INTRAVENOUS at 15:01

## 2023-02-09 NOTE — PROGRESS NOTES
[facility] Progress Note    Date: 2023    Name: Khris Boogie    MRN: [id number]         : 1980    Ms. Camila Singh Arrived ambulatory and in no distress for Zometa Infusion. Assessment was completed, no acute issues at this time, no new complaints voiced. 24 G PIV established to left arm, + blood return. Ms. Mulligan's vitals were reviewed. Visit Vitals  /75   Pulse (!) 50   Temp 97.8 °F (36.6 °C)   Resp 16   Ht 5' 2\" (1.575 m)   Wt 64.4 kg (141 lb 14.4 oz)   SpO2 98%   Breastfeeding No   BMI 25.95 kg/m²       Lab results were obtained and reviewed, within parameters for treatment. Recent Results (from the past 12 hour(s))   POC CHEM8    Collection Time: 23  2:52 PM   Result Value Ref Range    Calcium, ionized (POC) 1.16 1.12 - 1.32 mmol/L    Sodium (POC) 137 136 - 145 mmol/L    Potassium (POC) 3.3 (L) 3.5 - 5.1 mmol/L    Chloride (POC) 97 (L) 98 - 107 mmol/L    CO2 (POC) 29.4 21 - 32 mmol/L    Anion gap (POC) 12 10 - 20 mmol/L    Glucose (POC) 120 (H) 65 - 100 mg/dL    Creatinine (POC) 0.78 0.6 - 1.3 mg/dL    eGFR (POC) >60 >60 ml/min/1.73m2    Comment Comment Not Indicated. Medications:  Medications Administered       0.9% sodium chloride infusion       Admin Date  2023 Action  New Bag Dose  25 mL/hr Rate  25 mL/hr Route  IntraVENous Administered By  Rana Kemps, [place]              zoledronic acid (ZOMETA) 4 mg/100mL infusion       Admin Date  2023 Action  New Bag Dose  4 mg Rate  400 mL/hr Route  IntraVENous Administered By  Rana Kemps, [place]                     Ms. Camila Singh tolerated treatment well and was discharged from [place] [address] in stable condition at 1530. PIV flushed & removed. She is to return as directed by Dr. Le Velazco team for her next appointment.     [facility]  2023

## 2023-03-19 DIAGNOSIS — Z17.0 MALIGNANT NEOPLASM OF CENTRAL PORTION OF LEFT BREAST IN FEMALE, ESTROGEN RECEPTOR POSITIVE (HCC): ICD-10-CM

## 2023-03-19 DIAGNOSIS — C50.112 MALIGNANT NEOPLASM OF CENTRAL PORTION OF LEFT BREAST IN FEMALE, ESTROGEN RECEPTOR POSITIVE (HCC): ICD-10-CM

## 2023-03-20 RX ORDER — ANASTROZOLE 1 MG/1
TABLET ORAL
Qty: 90 TABLET | Refills: 3 | Status: SHIPPED | OUTPATIENT
Start: 2023-03-20

## 2023-04-25 ENCOUNTER — RESEARCH ENCOUNTER (OUTPATIENT)
Dept: ONCOLOGY | Age: 43
End: 2023-04-25

## 2023-04-25 ENCOUNTER — OFFICE VISIT (OUTPATIENT)
Dept: ONCOLOGY | Age: 43
End: 2023-04-25
Payer: COMMERCIAL

## 2023-04-25 VITALS
RESPIRATION RATE: 16 BRPM | OXYGEN SATURATION: 100 % | DIASTOLIC BLOOD PRESSURE: 95 MMHG | SYSTOLIC BLOOD PRESSURE: 146 MMHG | BODY MASS INDEX: 26.16 KG/M2 | HEART RATE: 67 BPM | WEIGHT: 143 LBS | TEMPERATURE: 98.1 F

## 2023-04-25 DIAGNOSIS — Z17.0 MALIGNANT NEOPLASM OF CENTRAL PORTION OF LEFT BREAST IN FEMALE, ESTROGEN RECEPTOR POSITIVE (HCC): Primary | ICD-10-CM

## 2023-04-25 DIAGNOSIS — C50.112 MALIGNANT NEOPLASM OF CENTRAL PORTION OF LEFT BREAST IN FEMALE, ESTROGEN RECEPTOR POSITIVE (HCC): Primary | ICD-10-CM

## 2023-04-25 DIAGNOSIS — Z78.0 POSTMENOPAUSAL: ICD-10-CM

## 2023-04-25 PROCEDURE — 3077F SYST BP >= 140 MM HG: CPT | Performed by: INTERNAL MEDICINE

## 2023-04-25 PROCEDURE — 99214 OFFICE O/P EST MOD 30 MIN: CPT | Performed by: INTERNAL MEDICINE

## 2023-04-25 PROCEDURE — 3080F DIAST BP >= 90 MM HG: CPT | Performed by: INTERNAL MEDICINE

## 2023-04-25 NOTE — PROGRESS NOTES
Cancer [place] at [address], [address] [facility]  Felipe Calderon: [phone]  F: [phone]    Reason for Visit:   Logan Waite is a 39 y.o. female who is seen for follow up of breast cancer    Breast surgeon:  Dr. Amando Latif surg:  Dr. Beatriz Bailey onc:  Dr. Aniya Bailey    Treatment History:   20 left breast ax core bx: Adenocarcinoma, ER + at 86% ; MI + at 69%; HER 2 POSITIVE (IHC 2+, FISH ratio 3.2; sig/cell 6.08), ki67 29%  20 L breast core bx: Atypical 1 mm focus, highly suspicious for [facility], lobular features, the tumor does not histologically match the patient's prior axillary cancer, but could represent a small sample of the same tumor  20 L breast excisional bx: Subareolar lumpectomy, IDC, 1.6 cm, invasive tumor present at anterior and lateral margins, gr 2, + LVI, 1/1 LN involved, 1.1 cm, ER + at 48%, MI + at 72%, HER 2 POSITIVE (IHC 2+, FISH ratio 2, sig/cell 4.14)  20 [facility] genetic testing:  negative  TCHP 2020-2020 Bilateral mastectomy: Right breast: benign. Left breast: Inflammation, no residual carcinoma, 2/6 LNs (micromets in both, 1.7 mm, and 1.1 mm). pT1c ypN1mi cM0  T-DM1 20-21  XRT 20-21   Anastrozole 2021-21 (menses returned); 21- current   Lupron 21- stopped 2022   BSO 22 - benign    History of Present Illness:   She noticed a L axilla mass in 2020, leading to the pathology above. Interval history:  Patient presents today for follow up on Anastrozole and lupron. Reports gr 1 hot flashes    S/p covid 19 vaccines    FH:   No breast, ovarian, prostate, or pancreas cancer    LMP 21    Past Medical History:   Diagnosis Date    Anxiety     Cancer [facility])     Essential hypertension     HX OTHER MEDICAL ,         Infertility     IVF with first pregnancy    Infertility, female     Joint pain     Psychiatric problem       Past Surgical History: Procedure Laterality Date    HX BILATERAL MASTECTOMY  2020    HX OTHER SURGICAL      [place] Teeth Removal    HX OTHER SURGICAL      1/2017 Excision of mole on toe with skin graph      Social History     Tobacco Use    Smoking status: Never    Smokeless tobacco: Never   Substance Use Topics    Alcohol use: Yes      Family History   Problem Relation Age of Onset    Diabetes Mother     Heart Disease Mother     Hypertension Mother     Hypertension Father     Cancer Paternal Grandmother         Lung cancer    Stroke Paternal Grandmother     Cancer Paternal Grandfather         Melanoma     Current Outpatient Medications   Medication Sig    anastrozole (ARIMIDEX) 1 mg tablet TAKE 1 TABLET BY MOUTH EVERY DAY    metoprolol tartrate (LOPRESSOR) 50 mg tablet TAKE 1 TABLET BY MOUTH TWICE A DAY    OTHER     celecoxib (CELEBREX) 200 mg capsule     loratadine (CLARITIN) 10 mg tablet Take 1 Tablet by mouth. fluticasone propionate (FLONASE ALLERGY RELIEF NA) by Nasal route. acetaminophen (TYLENOL PO) Take  by mouth. ibuprofen (MOTRIN) 600 mg tablet Take 1 Tab by mouth every six (6) hours as needed for Pain. sertraline (ZOLOFT) 25 mg tablet Take 1 Tablet by mouth daily. ondansetron (ZOFRAN ODT) 8 mg disintegrating tablet Take 1 Tab by mouth every eight (8) hours as needed for Nausea or Vomiting. For 2 days following chemo (Patient not taking: Reported on 4/25/2023)     No current facility-administered medications for this visit. No Known Allergies     Review of Systems: A complete review of systems was obtained, negative except as described above.     Physical Exam:     Visit Vitals  BP (!) 146/95 (BP 1 Location: Right arm, BP Patient Position: Sitting)   Pulse 67   Temp 98.1 °F (36.7 °C) (Temporal)   Resp 16   Wt 143 lb (64.9 kg)   SpO2 100%   BMI 26.16 kg/m²     ECOG PS: 0  General: no distress  Eyes: anicteric sclerae  HENT: oropharynx clear  Neck: supple  Respiratory: normal respiratory effort  CV: no peripheral edema  GI: nondistended  Skin: no rashes; no ecchymoses; no petechiae    Results:     Lab Results   Component Value Date/Time    WBC 5.0 09/07/2021 01:21 PM    HGB 12.4 09/07/2021 01:21 PM    HCT 36.3 09/07/2021 01:21 PM    PLATELET 003 (L) 81/11/3100 01:21 PM    MCV 87.1 09/07/2021 01:21 PM    ABS. NEUTROPHILS 3.0 09/07/2021 01:21 PM     Lab Results   Component Value Date/Time    Sodium 136 02/17/2022 01:34 PM    Potassium HEMOLYZED,RECOLLECT REQUESTED 02/17/2022 01:34 PM    Chloride 100 02/17/2022 01:34 PM    CO2 33 (H) 02/17/2022 01:34 PM    Glucose 87 02/17/2022 01:34 PM    BUN 20 02/17/2022 01:34 PM    Creatinine 0.81 02/17/2022 01:34 PM    GFR est AA >60 02/17/2022 01:34 PM    GFR est non-AA >60 02/17/2022 01:34 PM    Calcium 9.3 02/17/2022 01:34 PM    Sodium (POC) 137 02/09/2023 02:52 PM    Potassium (POC) 3.3 (L) 02/09/2023 02:52 PM    Chloride (POC) 97 (L) 02/09/2023 02:52 PM    Glucose (POC) 120 (H) 02/09/2023 02:52 PM    Creatinine (POC) 0.78 02/09/2023 02:52 PM    Calcium, ionized (POC) 1.16 02/09/2023 02:52 PM     Lab Results   Component Value Date/Time    Bilirubin, total 0.8 02/17/2022 01:34 PM    ALT (SGPT) 30 02/17/2022 01:34 PM    Alk. phosphatase 81 02/17/2022 01:34 PM    Protein, total 7.9 02/17/2022 01:34 PM    Albumin 4.2 02/17/2022 01:34 PM    Globulin 3.7 02/17/2022 01:34 PM     5/20/20 breast  MRI  Subareolar region of L breast 1.2 cm mass, at least 2 LN on left, 3 cm largest  Right breast negative    5/20/20 bone scan  Heterogeneous activity in the ribs of uncertain significance. This would be an unusual presentation of bony metastatic disease    5/20/20 CT c/a/p  negative    12/26/20 estradiol 8.6  FSH 95  LH 38.5    5/4/21 estradiol 441  FSH 8  LH 7.8    Records reviewed and summarized above.       Assessment/plan:   1.  L breast cancer, LN + by core, ER +, NM +, HER 2 POSITIVE, cT1c cN1a cM0:  Stage IIA, prognostic stage IB  postmenopausal    We explained to the patient that the goal of systemic adjuvant therapy is to improve the chances for cure and decrease the risk of relapse. We explained why a patient can have microscopic cancer spread now even though physical examination, laboratory studies and imaging studies are negative for cancer. We explained that the same treatments used now as adjuvant or preventive treatments rarely if ever are curative in women who develop metastases. TTE on 5/28/2020 EF 64%, TTE on 8/10/2020, EF 60%, TTE on 9/11/2020, EF 61%, overall stable, TTE on 12/2/2020, EF 60%, GLS stable. 3/8/21 TTE EF 62%, GLS normal; 6/2/21 TTE stable without change. 9/15/21 TTE EF 61%. 3/15/22 TTE with EF 64%. Bilateral mastectomies on 11/12/2020, no residual carcinoma in breast, 2/6 LNs (micromets). Discussed neratinib 240 mg daily with food for one year after the completion of herceptin. Discussed that there was only a 2% DFS benefit and that benefit was largely driven in the ER + population. Discussed that there are no data in its use post-T-DM1. She and I decline this at this time     Discussed common side effects of neratinib including but not limited to diarrhea, nausea, abdominal pain, fatigue, vomiting, rash, swollen and sore mouth (stomatitis), decreased appetite, muscle spasms, indigestion (dyspepsia), liver damage (AST or ALT enzyme increase), nail disorder, dry skin, abdominal swelling (distention), weight loss and urinary tract infection. She is tolerating anastrozole well. Will continue    2. Emotional well being:  She has excellent support and is coping well with her disease; on zoloft 75 mg daily. 3. Genetic testing:  discussed potential ramifications of a positive test including the potential need for bilateral mastectomies and bilateral oophorectomies and the risk then for her family members to also have a mutation. VUS also discussed. Tested in Dr. Olamide Mendez office on 5/21/20, negative    4.  Loss of fertility:  Discussed potential loss of menses and fertility. She is not interested in having further children. Declines oncofertility consult    5. Decline in LV strain and HTN: 7.8% decline since initial TTE on 5/28/2020, started on Coreg 3.125 mg BID. Repeat TTE on 9/10/2020, EF 61%, stable, 12/2/2020, stable 60%, strain stable. 3/8/21 TTE EF 62%, strain improved and normal.  Previously increased to coreg 12.5 mg bid. TTE 6/2/2021 with EF 62%, no interval change. TTE 9/15/21 stable. TTE 3/15/22 EF 64%. 6. Thrombocytopenia:  Due to TDM1 - mild. Plt 143 9/7/21. Cbc today    7. Joint pain:  Due to AI; improved; is taking omega 3 FA; discussed tart cherry juice, she is using    8. L side ROM issues:  previously referred to PT and improved. 9. HTN:  Metoprolol 50mg BID. Also on HCTZ 12.5mg daily. Normal at home    10. AST elevated:  Likely due to T-DM1, mild, monitoring. Cmp today    11. Hot flashes: prescribed oxybutynin 10 mg XL but she declined taking. She feels they are now stable/tolerable. 12. Osteopenia: dexa 1/2021; We discussed the data from [address], [address] [facility] 2013, for the meta-analysis for adjuvant bisphosphonate use in breast cancer. We discussed that in postmenopausal women, it appears that the bisphosphate zolendronic acid, given as in ABCSG 12 at 4 mg IV q 6 months x 3 years, is beneficial in improving bone DFS and OS. We discussed side effects such as ONJ and the need to avoid invasive dental procedures while on these medications, renal damage, and hypocalcemia. She is agreeable to this, #1 given 2/17/22, #2 8/25/22, #3 2/9/23. Next due in aug. Repeat dexa ordered    13. Right hip pain: resolved when stopped running. No neuropathy      Signed By: Mike [place] MD      No orders of the defined types were placed in this encounter.

## 2023-04-25 NOTE — PROGRESS NOTES
Pt in for follow up visit today per  protocol with Dr. Cely Hightower and RN. This is the 156 week time point on study. QOLs completed today. Patient is now off study.

## 2023-04-25 NOTE — PROGRESS NOTES
Melvin Jernigan is a 39 y.o. female here today to follow up for breast cancer. 1. Have you been to the ER, urgent care clinic since your last visit? Hospitalized since your last visit? no    2. Have you seen or consulted any other health care providers outside of the [address] [place] [address] since your last visit? Include any pap smears or colon screening.   no

## 2023-05-09 ENCOUNTER — TRANSCRIBE ORDERS (OUTPATIENT)
Facility: HOSPITAL | Age: 43
End: 2023-05-09

## 2023-05-09 DIAGNOSIS — Z17.1 MALIGNANT NEOPLASM OF CENTRAL PORTION OF LEFT BREAST IN FEMALE, ESTROGEN RECEPTOR NEGATIVE (HCC): Primary | ICD-10-CM

## 2023-05-09 DIAGNOSIS — Z78.0 POSTMENOPAUSAL: ICD-10-CM

## 2023-05-09 DIAGNOSIS — Z17.0 MALIGNANT NEOPLASM OF CENTRAL PORTION OF LEFT BREAST IN FEMALE, ESTROGEN RECEPTOR POSITIVE (HCC): Primary | ICD-10-CM

## 2023-05-09 DIAGNOSIS — C50.112 MALIGNANT NEOPLASM OF CENTRAL PORTION OF LEFT BREAST IN FEMALE, ESTROGEN RECEPTOR NEGATIVE (HCC): Primary | ICD-10-CM

## 2023-05-09 DIAGNOSIS — C50.112 MALIGNANT NEOPLASM OF CENTRAL PORTION OF LEFT BREAST IN FEMALE, ESTROGEN RECEPTOR POSITIVE (HCC): Primary | ICD-10-CM

## 2023-06-19 ENCOUNTER — HOSPITAL ENCOUNTER (OUTPATIENT)
Facility: HOSPITAL | Age: 43
Discharge: HOME | End: 2023-06-22
Payer: COMMERCIAL

## 2023-06-19 DIAGNOSIS — Z17.1 MALIGNANT NEOPLASM OF CENTRAL PORTION OF LEFT BREAST IN FEMALE, ESTROGEN RECEPTOR NEGATIVE (HCC): ICD-10-CM

## 2023-06-19 DIAGNOSIS — Z78.0 POSTMENOPAUSAL: ICD-10-CM

## 2023-06-19 DIAGNOSIS — C50.112 MALIGNANT NEOPLASM OF CENTRAL PORTION OF LEFT BREAST IN FEMALE, ESTROGEN RECEPTOR NEGATIVE (HCC): ICD-10-CM

## 2023-06-19 PROCEDURE — 77080 DXA BONE DENSITY AXIAL: CPT

## 2023-07-24 RX ORDER — METOPROLOL TARTRATE 50 MG/1
TABLET, FILM COATED ORAL
Qty: 180 TABLET | Refills: 1 | Status: SHIPPED | OUTPATIENT
Start: 2023-07-24

## 2023-08-17 ENCOUNTER — CLINICAL DOCUMENTATION (OUTPATIENT)
Facility: HOSPITAL | Age: 43
End: 2023-08-17

## 2023-08-17 DIAGNOSIS — C50.912 STAGE II BREAST CANCER, LEFT (HCC): ICD-10-CM

## 2023-08-17 DIAGNOSIS — M85.89 OSTEOPENIA OF MULTIPLE SITES: Primary | ICD-10-CM

## 2023-08-17 RX ORDER — ALBUTEROL SULFATE 90 UG/1
4 AEROSOL, METERED RESPIRATORY (INHALATION) PRN
OUTPATIENT
Start: 2023-08-24

## 2023-08-17 RX ORDER — HEPARIN 100 UNIT/ML
500 SYRINGE INTRAVENOUS PRN
OUTPATIENT
Start: 2023-08-24

## 2023-08-17 RX ORDER — ONDANSETRON 2 MG/ML
8 INJECTION INTRAMUSCULAR; INTRAVENOUS
OUTPATIENT
Start: 2023-08-24

## 2023-08-17 RX ORDER — SODIUM CHLORIDE 9 MG/ML
INJECTION, SOLUTION INTRAVENOUS CONTINUOUS
OUTPATIENT
Start: 2023-08-24

## 2023-08-17 RX ORDER — EPINEPHRINE 1 MG/ML
0.3 INJECTION, SOLUTION, CONCENTRATE INTRAVENOUS PRN
OUTPATIENT
Start: 2023-08-24

## 2023-08-17 RX ORDER — ZOLEDRONIC ACID 0.04 MG/ML
4 INJECTION, SOLUTION INTRAVENOUS ONCE
OUTPATIENT
Start: 2023-08-24 | End: 2023-08-24

## 2023-08-17 RX ORDER — DIPHENHYDRAMINE HYDROCHLORIDE 50 MG/ML
50 INJECTION INTRAMUSCULAR; INTRAVENOUS
OUTPATIENT
Start: 2023-08-24

## 2023-08-17 RX ORDER — SODIUM CHLORIDE 9 MG/ML
5-250 INJECTION, SOLUTION INTRAVENOUS PRN
OUTPATIENT
Start: 2023-08-24

## 2023-08-17 RX ORDER — SODIUM CHLORIDE 0.9 % (FLUSH) 0.9 %
5-40 SYRINGE (ML) INJECTION PRN
OUTPATIENT
Start: 2023-08-24

## 2023-08-17 RX ORDER — ACETAMINOPHEN 325 MG/1
650 TABLET ORAL
OUTPATIENT
Start: 2023-08-24

## 2023-08-24 ENCOUNTER — HOSPITAL ENCOUNTER (OUTPATIENT)
Facility: HOSPITAL | Age: 43
Setting detail: INFUSION SERIES
End: 2023-08-24
Payer: COMMERCIAL

## 2023-08-24 VITALS
DIASTOLIC BLOOD PRESSURE: 84 MMHG | SYSTOLIC BLOOD PRESSURE: 137 MMHG | HEART RATE: 60 BPM | OXYGEN SATURATION: 100 % | TEMPERATURE: 98 F | HEIGHT: 62 IN | WEIGHT: 141.5 LBS | RESPIRATION RATE: 18 BRPM | BODY MASS INDEX: 26.04 KG/M2

## 2023-08-24 DIAGNOSIS — M85.89 OSTEOPENIA OF MULTIPLE SITES: ICD-10-CM

## 2023-08-24 DIAGNOSIS — C50.912 STAGE II BREAST CANCER, LEFT (HCC): Primary | ICD-10-CM

## 2023-08-24 LAB
ALBUMIN SERPL-MCNC: 4.2 G/DL (ref 3.5–5)
ALBUMIN/GLOB SERPL: 1.2 (ref 1.1–2.2)
ALP SERPL-CCNC: 62 U/L (ref 45–117)
ALT SERPL-CCNC: 21 U/L (ref 12–78)
ANION GAP SERPL CALC-SCNC: 4 MMOL/L (ref 5–15)
AST SERPL-CCNC: 23 U/L (ref 15–37)
BILIRUB SERPL-MCNC: 0.7 MG/DL (ref 0.2–1)
BUN SERPL-MCNC: 15 MG/DL (ref 6–20)
BUN/CREAT SERPL: 16 (ref 12–20)
CALCIUM SERPL-MCNC: 9.3 MG/DL (ref 8.5–10.1)
CHLORIDE SERPL-SCNC: 104 MMOL/L (ref 97–108)
CO2 SERPL-SCNC: 30 MMOL/L (ref 21–32)
CREAT SERPL-MCNC: 0.94 MG/DL (ref 0.55–1.02)
GLOBULIN SER CALC-MCNC: 3.4 G/DL (ref 2–4)
GLUCOSE SERPL-MCNC: 112 MG/DL (ref 65–100)
POTASSIUM SERPL-SCNC: 3.3 MMOL/L (ref 3.5–5.1)
PROT SERPL-MCNC: 7.6 G/DL (ref 6.4–8.2)
SODIUM SERPL-SCNC: 138 MMOL/L (ref 136–145)

## 2023-08-24 PROCEDURE — 96365 THER/PROPH/DIAG IV INF INIT: CPT

## 2023-08-24 PROCEDURE — 80053 COMPREHEN METABOLIC PANEL: CPT

## 2023-08-24 PROCEDURE — 6370000000 HC RX 637 (ALT 250 FOR IP): Performed by: INTERNAL MEDICINE

## 2023-08-24 PROCEDURE — 6360000002 HC RX W HCPCS: Performed by: INTERNAL MEDICINE

## 2023-08-24 PROCEDURE — 2580000003 HC RX 258: Performed by: INTERNAL MEDICINE

## 2023-08-24 PROCEDURE — 36415 COLL VENOUS BLD VENIPUNCTURE: CPT

## 2023-08-24 RX ORDER — EPINEPHRINE 1 MG/ML
0.3 INJECTION, SOLUTION, CONCENTRATE INTRAVENOUS PRN
OUTPATIENT
Start: 2024-02-22

## 2023-08-24 RX ORDER — ALBUTEROL SULFATE 90 UG/1
4 AEROSOL, METERED RESPIRATORY (INHALATION) PRN
OUTPATIENT
Start: 2024-02-22

## 2023-08-24 RX ORDER — SODIUM CHLORIDE 9 MG/ML
5-250 INJECTION, SOLUTION INTRAVENOUS PRN
Status: DISCONTINUED | OUTPATIENT
Start: 2023-08-24 | End: 2023-08-25 | Stop reason: HOSPADM

## 2023-08-24 RX ORDER — ZOLEDRONIC ACID 0.04 MG/ML
4 INJECTION, SOLUTION INTRAVENOUS ONCE
Status: COMPLETED | OUTPATIENT
Start: 2023-08-24 | End: 2023-08-24

## 2023-08-24 RX ORDER — SODIUM CHLORIDE 9 MG/ML
5-250 INJECTION, SOLUTION INTRAVENOUS PRN
OUTPATIENT
Start: 2024-02-22

## 2023-08-24 RX ORDER — SODIUM CHLORIDE 0.9 % (FLUSH) 0.9 %
5-40 SYRINGE (ML) INJECTION PRN
OUTPATIENT
Start: 2024-02-22

## 2023-08-24 RX ORDER — SODIUM CHLORIDE 9 MG/ML
INJECTION, SOLUTION INTRAVENOUS CONTINUOUS
OUTPATIENT
Start: 2024-02-22

## 2023-08-24 RX ORDER — POTASSIUM CHLORIDE 1.5 G/1.58G
60 POWDER, FOR SOLUTION ORAL ONCE
Status: COMPLETED | OUTPATIENT
Start: 2023-08-24 | End: 2023-08-24

## 2023-08-24 RX ORDER — ZOLEDRONIC ACID 0.04 MG/ML
4 INJECTION, SOLUTION INTRAVENOUS ONCE
OUTPATIENT
Start: 2024-02-22 | End: 2024-02-22

## 2023-08-24 RX ORDER — DIPHENHYDRAMINE HYDROCHLORIDE 50 MG/ML
50 INJECTION INTRAMUSCULAR; INTRAVENOUS
OUTPATIENT
Start: 2024-02-22

## 2023-08-24 RX ORDER — HEPARIN 100 UNIT/ML
500 SYRINGE INTRAVENOUS PRN
OUTPATIENT
Start: 2024-02-22

## 2023-08-24 RX ORDER — ACETAMINOPHEN 325 MG/1
650 TABLET ORAL
OUTPATIENT
Start: 2024-02-22

## 2023-08-24 RX ORDER — ONDANSETRON 2 MG/ML
8 INJECTION INTRAMUSCULAR; INTRAVENOUS
OUTPATIENT
Start: 2024-02-22

## 2023-08-24 RX ADMIN — POTASSIUM CHLORIDE 60 MEQ: 1.5 POWDER, FOR SOLUTION ORAL at 16:27

## 2023-08-24 RX ADMIN — ZOLEDRONIC ACID 4 MG: 0.04 INJECTION, SOLUTION INTRAVENOUS at 16:05

## 2023-08-24 RX ADMIN — SODIUM CHLORIDE 25 ML/HR: 9 INJECTION, SOLUTION INTRAVENOUS at 16:02

## 2023-08-24 ASSESSMENT — PAIN SCALES - GENERAL: PAINLEVEL_OUTOF10: 0

## 2023-10-24 ENCOUNTER — OFFICE VISIT (OUTPATIENT)
Age: 43
End: 2023-10-24
Payer: COMMERCIAL

## 2023-10-24 VITALS
TEMPERATURE: 98.4 F | HEART RATE: 64 BPM | BODY MASS INDEX: 26.34 KG/M2 | DIASTOLIC BLOOD PRESSURE: 84 MMHG | SYSTOLIC BLOOD PRESSURE: 134 MMHG | WEIGHT: 144 LBS | OXYGEN SATURATION: 98 % | RESPIRATION RATE: 16 BRPM

## 2023-10-24 DIAGNOSIS — C50.112 MALIGNANT NEOPLASM OF CENTRAL PORTION OF LEFT BREAST IN FEMALE, ESTROGEN RECEPTOR POSITIVE (HCC): Primary | ICD-10-CM

## 2023-10-24 DIAGNOSIS — Z17.0 MALIGNANT NEOPLASM OF CENTRAL PORTION OF LEFT BREAST IN FEMALE, ESTROGEN RECEPTOR POSITIVE (HCC): Primary | ICD-10-CM

## 2023-10-24 PROCEDURE — 3079F DIAST BP 80-89 MM HG: CPT | Performed by: INTERNAL MEDICINE

## 2023-10-24 PROCEDURE — 99214 OFFICE O/P EST MOD 30 MIN: CPT | Performed by: INTERNAL MEDICINE

## 2023-10-24 PROCEDURE — 3075F SYST BP GE 130 - 139MM HG: CPT | Performed by: INTERNAL MEDICINE

## 2023-10-24 NOTE — PROGRESS NOTES
[facility] [place] at [address], [address] [place] [address]   Sameera Bhat: [phone]  F: [phone]          Reason for Visit:     Antoine Perez is a 39 y.o. female who is seen for follow up of breast cancer      Breast surgeon:  Dr. Jenni Espinoza surg:  Dr. Pj Lin onc:  Dr. Digeo Bridges          Treatment History:      5/8/20 left breast ax core bx: Adenocarcinoma, ER + at 86% ; MD + at 69%; HER 2 POSITIVE (IHC 2+, FISH ratio 3.2; sig/cell 6.08), ki67  29%    5/21/20 L breast core bx: Atypical 1 mm focus, highly suspicious for [facility], lobular features, the tumor does not histologically match the patient's prior axillary cancer, but could represent a small sample of the same tumor    5/27/20 L breast excisional bx: Subareolar lumpectomy, IDC, 1.6 cm, invasive tumor present at anterior and lateral margins, gr 2, + LVI, 1/1 LN involved, 1.1 cm, ER + at 48%, MD + at 72%, HER 2 POSITIVE (IHC 2+, FISH ratio 2, sig/cell 4.14)    5/20/20 [facility] genetic testing:  negative    TCHP 6/1/2020-9/14/2020 11/12/2020 Bilateral mastectomy: Right breast: benign. Left breast: Inflammation, no residual carcinoma, 2/6 LNs (micromets in both, 1.7 mm, and 1.1 mm). pT1c ypN1mi cM0    T-DM1 12/8/20-9/7/21    XRT 12/17/20-1/27/21     Anastrozole 2/2021-4/30/21 (menses returned); 7/7/21- current     Lupron 5/25/21- stopped 4/2022     BSO 5/1/22 - benign          History of Present Illness:     She noticed a L axilla mass in April 2020, leading to the pathology above. Interval history:  Patient presents today for follow up on Anastrozole. Reports gr 1 hot flashes      S/p covid 19 vaccines      FH: No breast, ovarian, prostate, or pancreas cancer      LMP 4/30/21   Review of systems was obtained and pertinent findings reviewed above. Past medical history, social history, family history, medications, and allergies are located in the electronic medical record.             Physical
Mynor Arriaza is a 39 y.o. female here today to follow up for breast cancer    1. Have you been to the ER, urgent care clinic since your last visit? Hospitalized since your last visit? JENNY may 2023 cellulitus     2. Have you seen or consulted any other health care providers outside of the [address] [place] [address] Avenue since your last visit? Include any pap smears or colon screening.  no
preventive treatments rarely if ever are curative in women who develop metastases. TTE on 5/28/2020 EF 64%, TTE on 8/10/2020, EF 60%, TTE on 9/11/2020, EF 61%, overall stable, TTE on 12/2/2020, EF 60%, GLS stable. 3/8/21 TTE EF 62%, GLS normal; 6/2/21 TTE stable without change. 9/15/21  TTE EF 61%. 3/15/22 TTE with EF 64%. Bilateral mastectomies on 11/12/2020, no residual carcinoma in breast, 2/6 LNs (micromets). Discussed neratinib 240 mg daily with food for one year after the completion of herceptin. Discussed that there was only a 2% DFS benefit and that benefit was largely driven in the ER + population. Discussed that there are no data in its use post-T-DM1. She and I decline this at this time       Discussed common side effects of neratinib including but not limited to diarrhea, nausea, abdominal pain, fatigue, vomiting, rash, swollen and sore mouth (stomatitis), decreased appetite, muscle spasms, indigestion (dyspepsia), liver damage (AST or ALT  enzyme increase), nail disorder, dry skin, abdominal swelling (distention), weight loss and urinary tract infection. She started anastrozole 2/2021 and tolerating well with JORGITO, will continue. 2. Emotional well being:  She has excellent support and is coping well with her disease; on zoloft 75 mg daily. 3. Genetic testing:  discussed potential ramifications of a positive test including the potential need for bilateral mastectomies and bilateral oophorectomies and the risk then for her family members  to also have a mutation. VUS also discussed. Tested in Dr. Jerome Redman office on 5/21/20,  negative      4. Loss of fertility:  Discussed potential loss of menses and fertility. She is not interested in having further children. Declines oncofertility consult      5. Decline in LV strain and HTN: 7.8% decline since initial TTE on 5/28/2020, started on Coreg 3.125 mg BID.   Repeat TTE on 9/10/2020, EF 61%, stable, 12/2/2020, stable

## 2024-01-18 ENCOUNTER — HOSPITAL ENCOUNTER (OUTPATIENT)
Facility: HOSPITAL | Age: 44
Setting detail: INFUSION SERIES
End: 2024-01-18

## 2024-02-13 DIAGNOSIS — C50.112 MALIGNANT NEOPLASM OF CENTRAL PORTION OF LEFT FEMALE BREAST (HCC): ICD-10-CM

## 2024-02-13 RX ORDER — ANASTROZOLE 1 MG/1
1 TABLET ORAL DAILY
Qty: 90 TABLET | Refills: 3 | Status: SHIPPED | OUTPATIENT
Start: 2024-02-13

## 2024-02-19 ENCOUNTER — HOSPITAL ENCOUNTER (OUTPATIENT)
Facility: HOSPITAL | Age: 44
Setting detail: INFUSION SERIES
Discharge: HOME | End: 2024-02-19
Payer: COMMERCIAL

## 2024-02-19 VITALS
SYSTOLIC BLOOD PRESSURE: 120 MMHG | HEIGHT: 62 IN | OXYGEN SATURATION: 95 % | BODY MASS INDEX: 26.35 KG/M2 | HEART RATE: 51 BPM | RESPIRATION RATE: 17 BRPM | TEMPERATURE: 98.7 F | WEIGHT: 143.2 LBS | DIASTOLIC BLOOD PRESSURE: 76 MMHG

## 2024-02-19 DIAGNOSIS — C50.912 STAGE II BREAST CANCER, LEFT (HCC): ICD-10-CM

## 2024-02-19 DIAGNOSIS — M85.89 OSTEOPENIA OF MULTIPLE SITES: Primary | ICD-10-CM

## 2024-02-19 LAB
ANION GAP BLD CALC-SCNC: 8.9 MMOL/L (ref 10–20)
CA-I BLD-MCNC: 1.21 MMOL/L (ref 1.12–1.32)
CHLORIDE BLD-SCNC: 102 MMOL/L (ref 98–107)
CO2 BLD-SCNC: 29.1 MMOL/L (ref 21–32)
CREAT BLD-MCNC: 0.33 MG/DL (ref 0.6–1.3)
GLUCOSE BLD-MCNC: 96 MG/DL (ref 65–100)
POTASSIUM BLD-SCNC: 4.1 MMOL/L (ref 3.5–5.1)
SERVICE CMNT-IMP: ABNORMAL
SODIUM BLD-SCNC: 140 MMOL/L (ref 136–145)

## 2024-02-19 PROCEDURE — 2580000003 HC RX 258: Performed by: INTERNAL MEDICINE

## 2024-02-19 PROCEDURE — 80047 BASIC METABLC PNL IONIZED CA: CPT

## 2024-02-19 PROCEDURE — 6360000002 HC RX W HCPCS: Performed by: INTERNAL MEDICINE

## 2024-02-19 PROCEDURE — 96374 THER/PROPH/DIAG INJ IV PUSH: CPT

## 2024-02-19 RX ORDER — SODIUM CHLORIDE 9 MG/ML
5-250 INJECTION, SOLUTION INTRAVENOUS PRN
Status: DISCONTINUED | OUTPATIENT
Start: 2024-02-19 | End: 2024-02-20 | Stop reason: HOSPADM

## 2024-02-19 RX ORDER — FAMOTIDINE 10 MG/ML
20 INJECTION, SOLUTION INTRAVENOUS
OUTPATIENT
Start: 2024-08-18

## 2024-02-19 RX ORDER — SODIUM CHLORIDE 9 MG/ML
5-250 INJECTION, SOLUTION INTRAVENOUS PRN
OUTPATIENT
Start: 2024-08-18

## 2024-02-19 RX ORDER — HYDROCHLOROTHIAZIDE 12.5 MG/1
12.5 TABLET ORAL DAILY
COMMUNITY

## 2024-02-19 RX ORDER — HEPARIN 100 UNIT/ML
500 SYRINGE INTRAVENOUS PRN
OUTPATIENT
Start: 2024-08-18

## 2024-02-19 RX ORDER — ZOLEDRONIC ACID 0.04 MG/ML
4 INJECTION, SOLUTION INTRAVENOUS ONCE
OUTPATIENT
Start: 2024-08-18 | End: 2024-08-18

## 2024-02-19 RX ORDER — ZOLEDRONIC ACID 0.04 MG/ML
4 INJECTION, SOLUTION INTRAVENOUS ONCE
Status: COMPLETED | OUTPATIENT
Start: 2024-02-19 | End: 2024-02-19

## 2024-02-19 RX ORDER — DIPHENHYDRAMINE HYDROCHLORIDE 50 MG/ML
50 INJECTION INTRAMUSCULAR; INTRAVENOUS
OUTPATIENT
Start: 2024-08-18

## 2024-02-19 RX ORDER — ONDANSETRON 2 MG/ML
8 INJECTION INTRAMUSCULAR; INTRAVENOUS
OUTPATIENT
Start: 2024-08-18

## 2024-02-19 RX ORDER — ALBUTEROL SULFATE 90 UG/1
4 AEROSOL, METERED RESPIRATORY (INHALATION) PRN
OUTPATIENT
Start: 2024-08-18

## 2024-02-19 RX ORDER — SODIUM CHLORIDE 0.9 % (FLUSH) 0.9 %
5-40 SYRINGE (ML) INJECTION PRN
OUTPATIENT
Start: 2024-08-18

## 2024-02-19 RX ORDER — EPINEPHRINE 1 MG/ML
0.3 INJECTION, SOLUTION INTRAMUSCULAR; SUBCUTANEOUS PRN
OUTPATIENT
Start: 2024-08-18

## 2024-02-19 RX ORDER — ACETAMINOPHEN 325 MG/1
650 TABLET ORAL
OUTPATIENT
Start: 2024-08-18

## 2024-02-19 RX ORDER — SODIUM CHLORIDE 9 MG/ML
INJECTION, SOLUTION INTRAVENOUS CONTINUOUS
OUTPATIENT
Start: 2024-08-18

## 2024-02-19 RX ADMIN — SODIUM CHLORIDE 25 ML/HR: 9 INJECTION, SOLUTION INTRAVENOUS at 10:57

## 2024-02-19 RX ADMIN — ZOLEDRONIC ACID 4 MG: 0.04 INJECTION, SOLUTION INTRAVENOUS at 11:01

## 2024-02-19 ASSESSMENT — PAIN SCALES - GENERAL: PAINLEVEL_OUTOF10: 0

## 2024-02-19 NOTE — PROGRESS NOTES
[facility] Progress Note        Date: 2024    Name: Essence Morris    MRN: [id number]         : 1980    Ms. Morris Arrived ambulatory and in no distress for Zometa Regimen.  Assessment was completed, no acute issues at this time, no new complaints voiced.  Pt denied pregnancy, denied having recent invasive dental procedure. RAC 24G PIV placed with +blood return, lab drawn and sent to processing. Criteria met for today treatment.        Ms. Morris's vitals were reviewed.  Patient Vitals for the past 12 hrs:   Temp Pulse Resp BP SpO2   24 1030 98.3 °F (36.8 °C) 58 17 134/84 98 %         Lab results were obtained and reviewed.  Recent Results (from the past 12 hour(s))   POC CHEM 8    Collection Time: 24 10:50 AM   Result Value Ref Range    POC Ionized Calcium 1.21 1.12 - 1.32 mmol/L    POC Sodium 140 136 - 145 mmol/L    POC Potassium 4.1 3.5 - 5.1 mmol/L    POC Chloride 102 98 - 107 mmol/L    POC TCO2 29.1 21 - 32 mmol/L    Anion Gap, POC 8.9 (L) 10 - 20 mmol/L    POC Glucose 96 65 - 100 mg/dL    POC Creatinine 0.33 (L) 0.6 - 1.3 mg/dL    eGFR, POC >60 >60 ml/min/1.73m2    UA Comment Comment Not Indicated.         Medications received:  Medications Administered         0.9 % sodium chloride infusion Admin Date  2024 Action  New Bag Dose  25 mL/hr Rate  25 mL/hr Route  IntraVENous Administered By  Autumn Davis RN        zoledronic acid (ZOMETA) 4 mg/100 mL infusion Admin Date  2024 Action  New Bag Dose  4 mg Rate  300 mL/hr Route  IntraVENous Administered By  Autumn Davis RN             Ms. Morris tolerated treatment well and was discharged from [facility] in stable condition at 1125. PIV flushed and removed per protocol. She is aware of her next appointment.    Autumn Davis RN  2024    Future Appointments:  Future Appointments   Date Time Provider Department Center   2024  2:30 PM Siddharth Blackmon MD [facility]

## 2024-04-24 ENCOUNTER — OFFICE VISIT (OUTPATIENT)
Age: 44
End: 2024-04-24
Payer: COMMERCIAL

## 2024-04-24 VITALS
HEART RATE: 63 BPM | TEMPERATURE: 98 F | WEIGHT: 145 LBS | OXYGEN SATURATION: 99 % | SYSTOLIC BLOOD PRESSURE: 142 MMHG | BODY MASS INDEX: 26.52 KG/M2 | RESPIRATION RATE: 16 BRPM | DIASTOLIC BLOOD PRESSURE: 96 MMHG

## 2024-04-24 DIAGNOSIS — Z17.0 MALIGNANT NEOPLASM OF CENTRAL PORTION OF LEFT BREAST IN FEMALE, ESTROGEN RECEPTOR POSITIVE (HCC): Primary | ICD-10-CM

## 2024-04-24 DIAGNOSIS — C50.112 MALIGNANT NEOPLASM OF CENTRAL PORTION OF LEFT BREAST IN FEMALE, ESTROGEN RECEPTOR POSITIVE (HCC): Primary | ICD-10-CM

## 2024-04-24 PROCEDURE — 3080F DIAST BP >= 90 MM HG: CPT | Performed by: INTERNAL MEDICINE

## 2024-04-24 PROCEDURE — 3077F SYST BP >= 140 MM HG: CPT | Performed by: INTERNAL MEDICINE

## 2024-04-24 PROCEDURE — 99214 OFFICE O/P EST MOD 30 MIN: CPT | Performed by: INTERNAL MEDICINE

## 2024-04-24 NOTE — PROGRESS NOTES
Cancer [place] at [facility]   [address], [address] [facility] [address]   W: [phone]  F: [phone]          Reason for Visit:     Essence Morris is a 44 y.o. female who is seen for follow up of breast cancer      Breast surgeon:  Dr. Benavides   Plastic surg:  Dr. Munoz   Rad onc:  Dr. Mishra          Treatment History:      5/8/20 left breast ax core bx:  Adenocarcinoma, ER + at 86% ; MD + at 69%; HER 2 POSITIVE (IHC 2+, FISH ratio 3.2; sig/cell 6.08), ki67  29%    5/21/20 L breast core bx:  Atypical 1 mm focus, highly suspicious for IMC, lobular features, the tumor does not histologically match the patient's prior axillary cancer, but could represent a small sample of the same tumor    5/27/20 L breast excisional bx:  Subareolar lumpectomy, IDC, 1.6 cm, invasive tumor present at anterior and lateral margins, gr 2, + LVI, 1/1 LN involved, 1.1 cm, ER + at 48%, MD + at 72%, HER 2 POSITIVE (IHC 2+, FISH ratio 2, sig/cell 4.14)    5/20/20 [facility] genetic testing:  negative    TCHP 6/1/2020-9/14/2020 11/12/2020 Bilateral mastectomy: Right breast: benign.  Left breast: Inflammation, no residual carcinoma, 2/6 LNs (micromets in both, 1.7 mm, and 1.1 mm). pT1c ypN1mi cM0    T-DM1 12/8/20-9/7/21    XRT 12/17/20-1/27/21     Anastrozole 2/2021-4/30/21 (menses returned); 7/7/21- current     Lupron 5/25/21- stopped 4/2022     BSO 5/1/22 - benign          History of Present Illness:     She noticed a L axilla mass in April 2020, leading to the pathology above.      Interval history:  Patient presents today for follow up on Anastrozole. Reports no issues today      S/p covid 19 vaccines      FH:  No breast, ovarian, prostate, or pancreas cancer      LMP 4/30/21     Review of systems was obtained and pertinent findings reviewed above. Past medical history, social history, family history, medications, and allergies are located in the electronic medical record.            Physical

## 2024-04-24 NOTE — PROGRESS NOTES
Essence Morris is a 44 y.o. female here today to follow up for breast cancer    1. Have you been to the ER, urgent care clinic since your last visit?  Hospitalized since your last visit?no    2. Have you seen or consulted any other health care providers outside of the [facility] since your last visit?  Include any pap smears or colon screening. no

## 2024-08-06 ENCOUNTER — TELEPHONE (OUTPATIENT)
Age: 44
End: 2024-08-06

## 2024-08-19 ENCOUNTER — HOSPITAL ENCOUNTER (OUTPATIENT)
Facility: HOSPITAL | Age: 44
Setting detail: INFUSION SERIES
Discharge: HOME | End: 2024-08-19
Payer: COMMERCIAL

## 2024-08-19 VITALS
DIASTOLIC BLOOD PRESSURE: 84 MMHG | WEIGHT: 145.5 LBS | HEIGHT: 62 IN | HEART RATE: 65 BPM | SYSTOLIC BLOOD PRESSURE: 134 MMHG | BODY MASS INDEX: 26.78 KG/M2 | RESPIRATION RATE: 18 BRPM | TEMPERATURE: 97.9 F

## 2024-08-19 DIAGNOSIS — C50.912 STAGE II BREAST CANCER, LEFT (HCC): Primary | ICD-10-CM

## 2024-08-19 DIAGNOSIS — M85.89 OSTEOPENIA OF MULTIPLE SITES: Primary | ICD-10-CM

## 2024-08-19 DIAGNOSIS — C50.912 STAGE II BREAST CANCER, LEFT (HCC): ICD-10-CM

## 2024-08-19 DIAGNOSIS — M85.89 OSTEOPENIA OF MULTIPLE SITES: ICD-10-CM

## 2024-08-19 LAB
ANION GAP BLD CALC-SCNC: 7.5 MMOL/L (ref 10–20)
CA-I BLD-MCNC: 1.17 MMOL/L (ref 1.12–1.32)
CHLORIDE BLD-SCNC: 102 MMOL/L (ref 98–107)
CO2 BLD-SCNC: 32.5 MMOL/L (ref 21–32)
CREAT BLD-MCNC: 0.98 MG/DL (ref 0.6–1.3)
GLUCOSE BLD-MCNC: 106 MG/DL (ref 70–110)
POTASSIUM BLD-SCNC: 3.5 MMOL/L (ref 3.5–5.1)
SERVICE CMNT-IMP: ABNORMAL
SODIUM BLD-SCNC: 142 MMOL/L (ref 136–145)

## 2024-08-19 PROCEDURE — 96365 THER/PROPH/DIAG IV INF INIT: CPT

## 2024-08-19 PROCEDURE — 80047 BASIC METABLC PNL IONIZED CA: CPT

## 2024-08-19 PROCEDURE — 6360000002 HC RX W HCPCS: Performed by: NURSE PRACTITIONER

## 2024-08-19 RX ORDER — ZOLEDRONIC ACID 0.04 MG/ML
4 INJECTION, SOLUTION INTRAVENOUS ONCE
OUTPATIENT
Start: 2025-02-16 | End: 2025-02-16

## 2024-08-19 RX ORDER — ACETAMINOPHEN 325 MG/1
650 TABLET ORAL
Status: CANCELLED | OUTPATIENT
Start: 2024-08-19

## 2024-08-19 RX ORDER — SODIUM CHLORIDE 9 MG/ML
5-250 INJECTION, SOLUTION INTRAVENOUS PRN
Status: CANCELLED | OUTPATIENT
Start: 2024-08-19

## 2024-08-19 RX ORDER — EPINEPHRINE 1 MG/ML
0.3 INJECTION, SOLUTION INTRAMUSCULAR; SUBCUTANEOUS PRN
OUTPATIENT
Start: 2025-02-16

## 2024-08-19 RX ORDER — SODIUM CHLORIDE 9 MG/ML
INJECTION, SOLUTION INTRAVENOUS CONTINUOUS
Status: CANCELLED | OUTPATIENT
Start: 2024-08-19

## 2024-08-19 RX ORDER — DIPHENHYDRAMINE HYDROCHLORIDE 50 MG/ML
50 INJECTION INTRAMUSCULAR; INTRAVENOUS
Status: CANCELLED | OUTPATIENT
Start: 2024-08-19

## 2024-08-19 RX ORDER — SODIUM CHLORIDE 9 MG/ML
5-250 INJECTION, SOLUTION INTRAVENOUS PRN
Status: DISCONTINUED | OUTPATIENT
Start: 2024-08-19 | End: 2024-08-20 | Stop reason: HOSPADM

## 2024-08-19 RX ORDER — ZOLEDRONIC ACID 0.04 MG/ML
4 INJECTION, SOLUTION INTRAVENOUS ONCE
Status: COMPLETED | OUTPATIENT
Start: 2024-08-19 | End: 2024-08-19

## 2024-08-19 RX ORDER — ACETAMINOPHEN 325 MG/1
650 TABLET ORAL
OUTPATIENT
Start: 2025-02-16

## 2024-08-19 RX ORDER — ALBUTEROL SULFATE 90 UG/1
4 AEROSOL, METERED RESPIRATORY (INHALATION) PRN
OUTPATIENT
Start: 2025-02-16

## 2024-08-19 RX ORDER — ZOLEDRONIC ACID 0.04 MG/ML
4 INJECTION, SOLUTION INTRAVENOUS ONCE
Status: CANCELLED | OUTPATIENT
Start: 2024-08-19 | End: 2024-08-19

## 2024-08-19 RX ORDER — ONDANSETRON 2 MG/ML
8 INJECTION INTRAMUSCULAR; INTRAVENOUS
OUTPATIENT
Start: 2025-02-16

## 2024-08-19 RX ORDER — FAMOTIDINE 10 MG/ML
20 INJECTION, SOLUTION INTRAVENOUS
Status: CANCELLED | OUTPATIENT
Start: 2024-08-19

## 2024-08-19 RX ORDER — SODIUM CHLORIDE 0.9 % (FLUSH) 0.9 %
5-40 SYRINGE (ML) INJECTION PRN
OUTPATIENT
Start: 2025-02-16

## 2024-08-19 RX ORDER — DIPHENHYDRAMINE HYDROCHLORIDE 50 MG/ML
50 INJECTION INTRAMUSCULAR; INTRAVENOUS
OUTPATIENT
Start: 2025-02-16

## 2024-08-19 RX ORDER — FAMOTIDINE 10 MG/ML
20 INJECTION, SOLUTION INTRAVENOUS
OUTPATIENT
Start: 2025-02-16

## 2024-08-19 RX ORDER — SODIUM CHLORIDE 0.9 % (FLUSH) 0.9 %
5-40 SYRINGE (ML) INJECTION PRN
Status: CANCELLED | OUTPATIENT
Start: 2024-08-19

## 2024-08-19 RX ORDER — SODIUM CHLORIDE 9 MG/ML
INJECTION, SOLUTION INTRAVENOUS CONTINUOUS
OUTPATIENT
Start: 2025-02-16

## 2024-08-19 RX ORDER — ALBUTEROL SULFATE 90 UG/1
4 AEROSOL, METERED RESPIRATORY (INHALATION) PRN
Status: CANCELLED | OUTPATIENT
Start: 2024-08-19

## 2024-08-19 RX ORDER — EPINEPHRINE 1 MG/ML
0.3 INJECTION, SOLUTION, CONCENTRATE INTRAVENOUS PRN
Status: CANCELLED | OUTPATIENT
Start: 2024-08-19

## 2024-08-19 RX ORDER — HEPARIN SODIUM (PORCINE) LOCK FLUSH IV SOLN 100 UNIT/ML 100 UNIT/ML
500 SOLUTION INTRAVENOUS PRN
Status: CANCELLED | OUTPATIENT
Start: 2024-08-19

## 2024-08-19 RX ORDER — SODIUM CHLORIDE 9 MG/ML
5-250 INJECTION, SOLUTION INTRAVENOUS PRN
OUTPATIENT
Start: 2025-02-16

## 2024-08-19 RX ORDER — ONDANSETRON 2 MG/ML
8 INJECTION INTRAMUSCULAR; INTRAVENOUS
Status: CANCELLED | OUTPATIENT
Start: 2024-08-19

## 2024-08-19 RX ORDER — HEPARIN 100 UNIT/ML
500 SYRINGE INTRAVENOUS PRN
OUTPATIENT
Start: 2025-02-16

## 2024-08-19 RX ADMIN — ZOLEDRONIC ACID 4 MG: 0.04 INJECTION, SOLUTION INTRAVENOUS at 15:31

## 2024-08-19 ASSESSMENT — PAIN SCALES - GENERAL: PAINLEVEL_OUTOF10: 0

## 2024-08-19 NOTE — PROGRESS NOTES
[facility] Progress Note    Date: August 19, 2024        1505: Pt arrived ambulatory to [facility] for Zometa in stable condition.  Assessment completed. PIV placed to right AC with positive blood return. Labs drawn and sent for processing.    Labs reviewed. Criteria for treatment was met.    Recent Results (from the past 12 hour(s))   POC CHEM 8    Collection Time: 08/19/24  3:19 PM   Result Value Ref Range    POC Ionized Calcium 1.17 1.12 - 1.32 mmol/L    POC Sodium 142 136 - 145 mmol/L    POC Potassium 3.5 3.5 - 5.1 mmol/L    POC Chloride 102 98 - 107 mmol/L    POC TCO2 32.5 (H) 21 - 32 mmol/L    Anion Gap, POC 7.5 (L) 10 - 20 mmol/L    POC Glucose 106 70 - 110 mg/dL    POC Creatinine 0.98 0.6 - 1.3 mg/dL    eGFR, POC 73 >60 ml/min/1.73m2    UA Comment Comment Not Indicated.         Patient Vitals for the past 12 hrs:   Temp Pulse Resp BP   08/19/24 1507 97.9 °F (36.6 °C) 65 18 134/84       Medications Administered         zoledronic acid (ZOMETA) 4 mg/100 mL infusion Admin Date  08/19/2024 Action  New Bag Dose  4 mg Rate  300 mL/hr Route  IntraVENous Documented By  Rubi Medrano, RN            1605:  Tolerated treatment well, no adverse reactions noted. PIV flushed and removed. 2x2 and coban placed. D/Cd from [facility] ambulatory and in no distress.  Patient is aware of next scheduled [facility] appointment on 10/21/24.    Future Appointments   Date Time Provider Department Center   10/21/2024  2:30 PM Siddharth Blackmon MD [facility] TYRELL Medrano, JAGDISH  August 19, 2024

## 2024-10-21 ENCOUNTER — OFFICE VISIT (OUTPATIENT)
Age: 44
End: 2024-10-21
Payer: COMMERCIAL

## 2024-10-21 VITALS
TEMPERATURE: 97.7 F | OXYGEN SATURATION: 100 % | SYSTOLIC BLOOD PRESSURE: 135 MMHG | DIASTOLIC BLOOD PRESSURE: 88 MMHG | RESPIRATION RATE: 18 BRPM | BODY MASS INDEX: 27.27 KG/M2 | HEART RATE: 59 BPM | WEIGHT: 148.2 LBS | HEIGHT: 62 IN

## 2024-10-21 DIAGNOSIS — C50.112 MALIGNANT NEOPLASM OF CENTRAL PORTION OF LEFT BREAST IN FEMALE, ESTROGEN RECEPTOR POSITIVE (HCC): Primary | ICD-10-CM

## 2024-10-21 DIAGNOSIS — Z17.0 MALIGNANT NEOPLASM OF CENTRAL PORTION OF LEFT BREAST IN FEMALE, ESTROGEN RECEPTOR POSITIVE (HCC): Primary | ICD-10-CM

## 2024-10-21 PROCEDURE — 3079F DIAST BP 80-89 MM HG: CPT | Performed by: INTERNAL MEDICINE

## 2024-10-21 PROCEDURE — 99214 OFFICE O/P EST MOD 30 MIN: CPT | Performed by: INTERNAL MEDICINE

## 2024-10-21 PROCEDURE — 3075F SYST BP GE 130 - 139MM HG: CPT | Performed by: INTERNAL MEDICINE

## 2024-10-21 ASSESSMENT — PATIENT HEALTH QUESTIONNAIRE - PHQ9
SUM OF ALL RESPONSES TO PHQ QUESTIONS 1-9: 0
SUM OF ALL RESPONSES TO PHQ9 QUESTIONS 1 & 2: 0
SUM OF ALL RESPONSES TO PHQ QUESTIONS 1-9: 0
1. LITTLE INTEREST OR PLEASURE IN DOING THINGS: NOT AT ALL
SUM OF ALL RESPONSES TO PHQ QUESTIONS 1-9: 0
2. FEELING DOWN, DEPRESSED OR HOPELESS: NOT AT ALL
SUM OF ALL RESPONSES TO PHQ QUESTIONS 1-9: 0

## 2024-10-21 NOTE — PROGRESS NOTES
Cancer [place] at [facility]   [address] [address], [address] [facility] [address]   W: [phone]  F: [phone]          Reason for Visit:     Essence Morris is a 44 y.o. female who is seen for follow up of breast cancer      Breast surgeon:  Dr. Benavides   Plastic surg:  Dr. Munoz   Rad onc:  Dr. Mishra          Treatment History:      5/8/20 left breast ax core bx:  Adenocarcinoma, ER + at 86% ; NV + at 69%; HER 2 POSITIVE (IHC 2+, FISH ratio 3.2; sig/cell 6.08), ki67  29%    5/21/20 L breast core bx:  Atypical 1 mm focus, highly suspicious for IMC, lobular features, the tumor does not histologically match the patient's prior axillary cancer, but could represent a small sample of the same tumor    5/27/20 L breast excisional bx:  Subareolar lumpectomy, IDC, 1.6 cm, invasive tumor present at anterior and lateral margins, gr 2, + LVI, 1/1 LN involved, 1.1 cm, ER + at 48%, NV + at 72%, HER 2 POSITIVE (IHC 2+, FISH ratio 2, sig/cell 4.14)    5/20/20 [facility] genetic testing:  negative    TCHP 6/1/2020-9/14/2020 11/12/2020 Bilateral mastectomy: Right breast: benign.  Left breast: Inflammation, no residual carcinoma, 2/6 LNs (micromets in both, 1.7 mm, and 1.1 mm). pT1c ypN1mi cM0    T-DM1 12/8/20-9/7/21    XRT 12/17/20-1/27/21     Anastrozole 2/2021-4/30/21 (menses returned); 7/7/21- current     Lupron 5/25/21- stopped 4/2022     BSO 5/1/22 - benign          History of Present Illness:     She noticed a L axilla mass in April 2020, leading to the pathology above.      Interval history:  Patient presents today for follow up on Anastrozole. Reports gr 2 hot flashes      S/p covid 19 vaccines      FH:  No breast, ovarian, prostate, or pancreas cancer      LMP 4/30/21     Review of systems was obtained and pertinent findings reviewed above. Past medical history, social history, family history, medications, and allergies are located in the electronic medical record.            Physical

## 2024-10-21 NOTE — PROGRESS NOTES
Essence Morris is a 44 y.o. female is here today for a breast cancer follow up.    1. Have you been to the ER, urgent care clinic since your last visit?  Hospitalized since your last visit?No    2. Have you seen or consulted any other health care providers outside of the [facility] since your last visit?  Include any pap smears or colon screening. No

## 2024-12-27 ENCOUNTER — TELEPHONE (OUTPATIENT)
Age: 44
End: 2024-12-27

## 2024-12-27 NOTE — TELEPHONE ENCOUNTER
[place] [facility] [place] at [facility]  [phone]    12/27/24 2:05 PM EST - Attempted to call patient, no answer. A voice message was left for patient to call our office at [phone] in regards of BCI results.

## 2024-12-27 NOTE — TELEPHONE ENCOUNTER
[place] [facility] [place] at [facility]  [phone]    12/27/24 2:34 PM EST - Received call back from patient in regards of BCI results. Informed patient that she would not benefit from extending her endocrine therapy past 5 years, meaning she would be able to stop her anastrozole in February of 2026, per Dr. Blackmon. Patient verbalized understanding and will further discuss results with provider at her next follow up visit.

## 2025-03-02 DIAGNOSIS — C50.112 MALIGNANT NEOPLASM OF CENTRAL PORTION OF LEFT FEMALE BREAST (HCC): ICD-10-CM

## 2025-03-03 RX ORDER — ANASTROZOLE 1 MG/1
1 TABLET ORAL DAILY
Qty: 90 TABLET | Refills: 3 | Status: SHIPPED | OUTPATIENT
Start: 2025-03-03

## 2025-04-21 ENCOUNTER — OFFICE VISIT (OUTPATIENT)
Age: 45
End: 2025-04-21
Payer: COMMERCIAL

## 2025-04-21 VITALS
TEMPERATURE: 98.1 F | BODY MASS INDEX: 26.28 KG/M2 | OXYGEN SATURATION: 100 % | WEIGHT: 142.8 LBS | DIASTOLIC BLOOD PRESSURE: 81 MMHG | SYSTOLIC BLOOD PRESSURE: 124 MMHG | HEIGHT: 62 IN | HEART RATE: 58 BPM

## 2025-04-21 DIAGNOSIS — C50.112 MALIGNANT NEOPLASM OF CENTRAL PORTION OF LEFT BREAST IN FEMALE, ESTROGEN RECEPTOR POSITIVE (HCC): ICD-10-CM

## 2025-04-21 DIAGNOSIS — Z78.0 POST-MENOPAUSAL: Primary | ICD-10-CM

## 2025-04-21 DIAGNOSIS — Z17.0 MALIGNANT NEOPLASM OF CENTRAL PORTION OF LEFT BREAST IN FEMALE, ESTROGEN RECEPTOR POSITIVE (HCC): ICD-10-CM

## 2025-04-21 PROCEDURE — 3074F SYST BP LT 130 MM HG: CPT | Performed by: NURSE PRACTITIONER

## 2025-04-21 PROCEDURE — 3079F DIAST BP 80-89 MM HG: CPT | Performed by: NURSE PRACTITIONER

## 2025-04-21 PROCEDURE — 99214 OFFICE O/P EST MOD 30 MIN: CPT | Performed by: NURSE PRACTITIONER

## 2025-04-21 NOTE — PROGRESS NOTES
Essence Morris is a 45 y.o. female   Follow-up    Vitals:    04/21/25 1501   BP: 124/81   Pulse: 58   Temp: 98.1 °F (36.7 °C)   SpO2: 100%   Weight: 64.8 kg (142 lb 12.8 oz)   Height: 1.575 m (5' 2\")     No LMP recorded. (Menstrual status: Chemically Induced).    \"Have you been to the ER, urgent care clinic since your last visit?  Hospitalized since your last visit?\"    NO    “Have you seen or consulted any other health care providers [facility] of [facility] visit?”    NO

## 2025-04-21 NOTE — PROGRESS NOTES
Cancer [place] at [place]   [address], [address] [facility] [address]   W: [phone]  F: [phone]          Reason for Visit:     Essence Morris is a 45 y.o. female who is seen for follow up of breast cancer      Breast surgeon:  Dr. Benavides   Plastic surg:  Dr. Munoz   Rad onc:  Dr. Mishra          Treatment History:      5/8/20 left breast ax core bx:  Adenocarcinoma, ER + at 86% ; CA + at 69%; HER 2 POSITIVE (IHC 2+, FISH ratio 3.2; sig/cell 6.08), ki67  29%    5/21/20 L breast core bx:  Atypical 1 mm focus, highly suspicious for IMC, lobular features, the tumor does not histologically match the patient's prior axillary cancer, but could represent a small sample of the same tumor    5/27/20 L breast excisional bx:  Subareolar lumpectomy, IDC, 1.6 cm, invasive tumor present at anterior and lateral margins, gr 2, + LVI, 1/1 LN involved, 1.1 cm, ER + at 48%, CA + at 72%, HER 2 POSITIVE (IHC 2+, FISH ratio 2, sig/cell 4.14)    5/20/20 [facility] genetic testing:  negative    TCHP 6/1/2020-9/14/2020 11/12/2020 Bilateral mastectomy: Right breast: benign.  Left breast: Inflammation, no residual carcinoma, 2/6 LNs (micromets in both, 1.7 mm, and 1.1 mm) pT1c ypN1mi cM0    T-DM1 12/8/20-9/7/21    XRT 12/17/20-1/27/21     Anastrozole 2/2021-4/30/21 (menses returned); 7/7/21- current     Lupron 5/25/21- stopped 4/2022     BSO 5/1/22 - benign       History of Present Illness:     She noticed a L axilla mass in April 2020, leading to the pathology above.      Interval history:  Patient presents today for follow up on Anastrozole. Reports gr 1 hot flashes.      S/p covid 19 vaccines      FH:  No breast, ovarian, prostate, or pancreas cancer      LMP 4/30/21     Review of systems was obtained and pertinent findings reviewed above. Past medical history, social history, family history, medications, and allergies are located in the electronic medical record.            Physical